# Patient Record
Sex: FEMALE | Race: WHITE | Employment: OTHER | URBAN - METROPOLITAN AREA
[De-identification: names, ages, dates, MRNs, and addresses within clinical notes are randomized per-mention and may not be internally consistent; named-entity substitution may affect disease eponyms.]

---

## 2017-12-04 ENCOUNTER — HOSPITAL ENCOUNTER (OUTPATIENT)
Dept: LAB | Facility: MEDICAL CENTER | Age: 82
End: 2017-12-04
Attending: FAMILY MEDICINE
Payer: MEDICARE

## 2017-12-04 LAB
ALBUMIN SERPL BCP-MCNC: 4.5 G/DL (ref 3.2–4.9)
ALBUMIN/GLOB SERPL: 1.6 G/DL
ALP SERPL-CCNC: 69 U/L (ref 30–99)
ALT SERPL-CCNC: 20 U/L (ref 2–50)
ANION GAP SERPL CALC-SCNC: 6 MMOL/L (ref 0–11.9)
APPEARANCE UR: CLEAR
AST SERPL-CCNC: 28 U/L (ref 12–45)
BASOPHILS # BLD AUTO: 1 % (ref 0–1.8)
BASOPHILS # BLD: 0.05 K/UL (ref 0–0.12)
BILIRUB SERPL-MCNC: 0.7 MG/DL (ref 0.1–1.5)
BILIRUB UR QL STRIP.AUTO: NEGATIVE
BUN SERPL-MCNC: 22 MG/DL (ref 8–22)
CALCIUM SERPL-MCNC: 10.1 MG/DL (ref 8.5–10.5)
CHLORIDE SERPL-SCNC: 100 MMOL/L (ref 96–112)
CHOLEST SERPL-MCNC: 167 MG/DL (ref 100–199)
CO2 SERPL-SCNC: 29 MMOL/L (ref 20–33)
COLOR UR: YELLOW
CREAT SERPL-MCNC: 1.09 MG/DL (ref 0.5–1.4)
CULTURE IF INDICATED INDCX: NO UA CULTURE
EOSINOPHIL # BLD AUTO: 0.08 K/UL (ref 0–0.51)
EOSINOPHIL NFR BLD: 1.6 % (ref 0–6.9)
ERYTHROCYTE [DISTWIDTH] IN BLOOD BY AUTOMATED COUNT: 41.1 FL (ref 35.9–50)
GFR SERPL CREATININE-BSD FRML MDRD: 48 ML/MIN/1.73 M 2
GLOBULIN SER CALC-MCNC: 2.9 G/DL (ref 1.9–3.5)
GLUCOSE SERPL-MCNC: 86 MG/DL (ref 65–99)
GLUCOSE UR STRIP.AUTO-MCNC: NEGATIVE MG/DL
HCT VFR BLD AUTO: 41.8 % (ref 37–47)
HDLC SERPL-MCNC: 77 MG/DL
HGB BLD-MCNC: 13.9 G/DL (ref 12–16)
IMM GRANULOCYTES # BLD AUTO: 0.02 K/UL (ref 0–0.11)
IMM GRANULOCYTES NFR BLD AUTO: 0.4 % (ref 0–0.9)
KETONES UR STRIP.AUTO-MCNC: NEGATIVE MG/DL
LDLC SERPL CALC-MCNC: 77 MG/DL
LEUKOCYTE ESTERASE UR QL STRIP.AUTO: NEGATIVE
LYMPHOCYTES # BLD AUTO: 1.13 K/UL (ref 1–4.8)
LYMPHOCYTES NFR BLD: 23.2 % (ref 22–41)
MCH RBC QN AUTO: 30.2 PG (ref 27–33)
MCHC RBC AUTO-ENTMCNC: 33.3 G/DL (ref 33.6–35)
MCV RBC AUTO: 90.7 FL (ref 81.4–97.8)
MICRO URNS: NORMAL
MONOCYTES # BLD AUTO: 0.36 K/UL (ref 0–0.85)
MONOCYTES NFR BLD AUTO: 7.4 % (ref 0–13.4)
NEUTROPHILS # BLD AUTO: 3.24 K/UL (ref 2–7.15)
NEUTROPHILS NFR BLD: 66.4 % (ref 44–72)
NITRITE UR QL STRIP.AUTO: NEGATIVE
NRBC # BLD AUTO: 0 K/UL
NRBC BLD AUTO-RTO: 0 /100 WBC
PH UR STRIP.AUTO: 7 [PH]
PLATELET # BLD AUTO: 191 K/UL (ref 164–446)
PMV BLD AUTO: 11.5 FL (ref 9–12.9)
POTASSIUM SERPL-SCNC: 4.1 MMOL/L (ref 3.6–5.5)
PROT SERPL-MCNC: 7.4 G/DL (ref 6–8.2)
PROT UR QL STRIP: NEGATIVE MG/DL
RBC # BLD AUTO: 4.61 M/UL (ref 4.2–5.4)
RBC UR QL AUTO: NEGATIVE
SODIUM SERPL-SCNC: 135 MMOL/L (ref 135–145)
SP GR UR STRIP.AUTO: 1.02
TRIGL SERPL-MCNC: 64 MG/DL (ref 0–149)
TSH SERPL DL<=0.005 MIU/L-ACNC: 4.6 UIU/ML (ref 0.3–3.7)
UROBILINOGEN UR STRIP.AUTO-MCNC: 0.2 MG/DL
WBC # BLD AUTO: 4.9 K/UL (ref 4.8–10.8)

## 2017-12-04 PROCEDURE — 81003 URINALYSIS AUTO W/O SCOPE: CPT

## 2017-12-04 PROCEDURE — 80053 COMPREHEN METABOLIC PANEL: CPT

## 2017-12-04 PROCEDURE — 80061 LIPID PANEL: CPT

## 2017-12-04 PROCEDURE — 84443 ASSAY THYROID STIM HORMONE: CPT

## 2017-12-04 PROCEDURE — 36415 COLL VENOUS BLD VENIPUNCTURE: CPT

## 2017-12-04 PROCEDURE — 85025 COMPLETE CBC W/AUTO DIFF WBC: CPT

## 2018-01-23 ENCOUNTER — HOSPITAL ENCOUNTER (OUTPATIENT)
Dept: LAB | Facility: MEDICAL CENTER | Age: 83
End: 2018-01-23
Attending: FAMILY MEDICINE
Payer: MEDICARE

## 2018-01-23 PROCEDURE — 36415 COLL VENOUS BLD VENIPUNCTURE: CPT

## 2018-01-23 PROCEDURE — 83003 ASSAY GROWTH HORMONE (HGH): CPT

## 2018-01-25 LAB — GHRH SERPL-MCNC: 0.28 NG/ML (ref 0.05–8)

## 2018-02-13 ENCOUNTER — APPOINTMENT (RX ONLY)
Dept: URBAN - METROPOLITAN AREA CLINIC 4 | Facility: CLINIC | Age: 83
Setting detail: DERMATOLOGY
End: 2018-02-13

## 2018-02-13 DIAGNOSIS — L20.89 OTHER ATOPIC DERMATITIS: ICD-10-CM

## 2018-02-13 DIAGNOSIS — Z85.828 PERSONAL HISTORY OF OTHER MALIGNANT NEOPLASM OF SKIN: ICD-10-CM

## 2018-02-13 DIAGNOSIS — D22 MELANOCYTIC NEVI: ICD-10-CM

## 2018-02-13 DIAGNOSIS — L82.1 OTHER SEBORRHEIC KERATOSIS: ICD-10-CM

## 2018-02-13 DIAGNOSIS — D18.0 HEMANGIOMA: ICD-10-CM

## 2018-02-13 DIAGNOSIS — L81.4 OTHER MELANIN HYPERPIGMENTATION: ICD-10-CM

## 2018-02-13 DIAGNOSIS — L57.0 ACTINIC KERATOSIS: ICD-10-CM

## 2018-02-13 PROBLEM — D18.01 HEMANGIOMA OF SKIN AND SUBCUTANEOUS TISSUE: Status: ACTIVE | Noted: 2018-02-13

## 2018-02-13 PROBLEM — L20.84 INTRINSIC (ALLERGIC) ECZEMA: Status: ACTIVE | Noted: 2018-02-13

## 2018-02-13 PROBLEM — D22.5 MELANOCYTIC NEVI OF TRUNK: Status: ACTIVE | Noted: 2018-02-13

## 2018-02-13 PROCEDURE — ? COUNSELING

## 2018-02-13 PROCEDURE — 99213 OFFICE O/P EST LOW 20 MIN: CPT | Mod: 25

## 2018-02-13 PROCEDURE — 17003 DESTRUCT PREMALG LES 2-14: CPT

## 2018-02-13 PROCEDURE — ? LIQUID NITROGEN

## 2018-02-13 PROCEDURE — 17000 DESTRUCT PREMALG LESION: CPT

## 2018-02-13 ASSESSMENT — LOCATION DETAILED DESCRIPTION DERM
LOCATION DETAILED: RIGHT PROXIMAL PRETIBIAL REGION
LOCATION DETAILED: SUBXIPHOID
LOCATION DETAILED: LEFT ANTERIOR LATERAL PROXIMAL THIGH
LOCATION DETAILED: LEFT PROXIMAL PRETIBIAL REGION
LOCATION DETAILED: LEFT INFERIOR MEDIAL MALAR CHEEK
LOCATION DETAILED: LEFT MEDIAL BREAST 10-11:00 REGION
LOCATION DETAILED: EPIGASTRIC SKIN
LOCATION DETAILED: PERIUMBILICAL SKIN

## 2018-02-13 ASSESSMENT — LOCATION SIMPLE DESCRIPTION DERM
LOCATION SIMPLE: RIGHT PRETIBIAL REGION
LOCATION SIMPLE: LEFT THIGH
LOCATION SIMPLE: LEFT BREAST
LOCATION SIMPLE: LEFT CHEEK
LOCATION SIMPLE: ABDOMEN
LOCATION SIMPLE: LEFT PRETIBIAL REGION

## 2018-02-13 ASSESSMENT — LOCATION ZONE DERM
LOCATION ZONE: TRUNK
LOCATION ZONE: FACE
LOCATION ZONE: LEG

## 2018-02-13 NOTE — PROCEDURE: REASSURANCE
Detail Level: Detailed
Include Location In Plan?: No
Detail Level: Generalized
Detail Level: Zone
Include Location In Plan?: Yes

## 2018-02-13 NOTE — PROCEDURE: LIQUID NITROGEN
Detail Level: Detailed
Consent: The patient's consent was obtained including but not limited to risks of crusting, scabbing, blistering, scarring, darker or lighter pigmentary change, recurrence, incomplete removal and infection.
Duration Of Freeze Thaw-Cycle (Seconds): 5
Number Of Freeze-Thaw Cycles: 1 freeze-thaw cycle
Render Post-Care Instructions In Note?: no
Post-Care Instructions: I reviewed with the patient in detail post-care instructions. Patient is to wear sunprotection, and avoid picking at any of the treated lesions. Pt may apply Vaseline to crusted or scabbing areas.
Medical Necessity Information: It is in your best interest to select a reason for this procedure from the list below. All of these items fulfill various CMS LCD requirements except the new and changing color options.
Duration Of Freeze Thaw-Cycle (Seconds): 0
Medical Necessity Clause: This procedure was medically necessary because the lesions that were treated were:

## 2018-02-28 ENCOUNTER — HOSPITAL ENCOUNTER (EMERGENCY)
Facility: MEDICAL CENTER | Age: 83
End: 2018-02-28
Attending: EMERGENCY MEDICINE
Payer: MEDICARE

## 2018-02-28 VITALS
OXYGEN SATURATION: 96 % | DIASTOLIC BLOOD PRESSURE: 82 MMHG | TEMPERATURE: 98.2 F | WEIGHT: 114.64 LBS | HEIGHT: 64 IN | BODY MASS INDEX: 19.57 KG/M2 | HEART RATE: 83 BPM | SYSTOLIC BLOOD PRESSURE: 186 MMHG | RESPIRATION RATE: 18 BRPM

## 2018-02-28 DIAGNOSIS — R04.0 EPISTAXIS: ICD-10-CM

## 2018-02-28 PROCEDURE — 99284 EMERGENCY DEPT VISIT MOD MDM: CPT

## 2018-02-28 ASSESSMENT — PAIN SCALES - GENERAL: PAINLEVEL_OUTOF10: 0

## 2018-02-28 NOTE — ED NOTES
ERP at bedside. Pt agrees with plan of care discussed by ERP. AIDET acknowledged with patient. Sunil in low position, side rail up for pt safety. Call light within reach. Will continue to monitor.

## 2018-02-28 NOTE — DISCHARGE INSTRUCTIONS
Buy Ponaris at the store, buy humidifier for bedroom    Nosebleed  Nosebleeds are common. They are due to a crack in the inside lining of your nose (mucous membrane) or from a small blood vessel that starts to bleed. Nosebleeds can be caused by many conditions, such as injury, infections, dry mucous membranes or dry climate, medicines, nose picking, and home heating and cooling systems. Most nosebleeds come from blood vessels in the front of your nose.  HOME CARE INSTRUCTIONS   · Try controlling your nosebleed by pinching your nostrils gently and continuously for at least 10 minutes.  · Avoid blowing or sniffing your nose for a number of hours after having a nosebleed.  · Do not put gauze inside your nose yourself. If your nose was packed by your health care provider, try to maintain the pack inside of your nose until your health care provider removes it.  ¨ If a gauze pack was used and it starts to fall out, gently replace it or cut off the end of it.  ¨ If a balloon catheter was used to pack your nose, do not cut or remove it unless your health care provider has instructed you to do that.  · Avoid lying down while you are having a nosebleed. Sit up and lean forward.  · Use a nasal spray decongestant to help with a nosebleed as directed by your health care provider.  · Do not use petroleum jelly or mineral oil in your nose. These can drip into your lungs.  · Maintain humidity in your home by using less air conditioning or by using a humidifier.  · Aspirin and blood thinners make bleeding more likely. If you are prescribed these medicines and you suffer from nosebleeds, ask your health care provider if you should stop taking the medicines or adjust the dose. Do not stop medicines unless directed by your health care provider  · Resume your normal activities as you are able, but avoid straining, lifting, or bending at the waist for several days.  · If your nosebleed was caused by dry mucous membranes, use  over-the-counter saline nasal spray or gel. This will keep the mucous membranes moist and allow them to heal. If you must use a lubricant, choose the water-soluble variety. Use it only sparingly, and do not use it within several hours of lying down.  · Keep all follow-up visits as directed by your health care provider. This is important.  SEEK MEDICAL CARE IF:  · You have a fever.  · You get frequent nosebleeds.  · You are getting nosebleeds more often.  SEEK IMMEDIATE MEDICAL CARE IF:  · Your nosebleed lasts longer than 20 minutes.  · Your nosebleed occurs after an injury to your face, and your nose looks crooked or broken.  · You have unusual bleeding from other parts of your body.  · You have unusual bruising on other parts of your body.  · You feel light-headed or you faint.  · You become sweaty.  · You vomit blood.  · Your nosebleed occurs after a head injury.     This information is not intended to replace advice given to you by your health care provider. Make sure you discuss any questions you have with your health care provider.     Document Released: 09/27/2006 Document Revised: 01/08/2016 Document Reviewed: 08/03/2015  [employer] Interactive Patient Education ©2016 [employer]

## 2018-02-28 NOTE — ED PROVIDER NOTES
ED Provider Note    CHIEF COMPLAINT  Chief Complaint   Patient presents with   • Epistaxis       HPI  Denae Rivas is a 82 y.o. female who presents with left-sided nosebleed that began 3 hours ago. The patient denies being on blood thinners, she has never had a bloody nose previously. She denies any other symptoms. She reports her blood pressure is usually low.    REVIEW OF SYSTEMS  See HPI for further details. All other systems are negative.     PAST MEDICAL HISTORY   has a past medical history of Anesthesia; CATARACT; and Shingles (2009).    SOCIAL HISTORY  Social History     Social History Main Topics   • Smoking status: Never Smoker   • Smokeless tobacco: Not on file   • Alcohol use No   • Drug use: No   • Sexual activity: Not on file       SURGICAL HISTORY   has a past surgical history that includes mammoplasty augmentation (); cataract extraction with iol (); breast implant revision (2010); mastopexy (2010); other (); abdominoplasty (2011); breast implant revision (2011); liposuction (2011); capsulectomy (2011); and enlarge breast with implant (76,84,94,10).    CURRENT MEDICATIONS  cephALEXin (KEFLEX) 500 MG CAPS 2011    Sig: Take 1 Cap by mouth 3 times a day.   Class: Print Rx Paper   Route: Oral   Number of times this order has been changed since signin     Order Audit [place]     hydrALAZINE (APRESOLINE) 10 MG TABS 2011    Sig: Take 1 Tab by mouth every 8 hours.   Class: Print Rx Paper   Route: Oral   Number of times this order has been changed since signin     Order Audit [place]     ARNICA 2011    Sig: by Does not apply route 3 times a day. Takes 5 pellets tid    Class: Historical Med   Route: Does not apply   Number of times this order has been changed since signin     Order Audit [place]     ACYCLOVIR 2011    Sig: by Does not apply route 2 Times a Day.   Class: Historical Med   Route: Does not apply   Number of times this order  "has been changed since signin     Order Audit [place]     zafirlukast (ACCOLATE) 20 MG TABS     Sig: Take 20 mg by mouth 2 times a day.   Class: Historical Med   Route: Oral   Number of times this order has been changed since signin     Order Audit [place]     Multiple Vitamin (MULTIVITAMIN PO) 2010    Sig: Take  by mouth every day.   Class: Historical Med   Route: Oral   Number of times this order has been changed since signin     Order Audit [place]           ALLERGIES  Allergies   Allergen Reactions   • Tetracycline      \"felt intoxicated\"       PHYSICAL EXAM  VITAL SIGNS: BP (!) 186/82   Pulse 92   Temp 36.8 °C (98.2 °F)   Resp 18   Ht 1.626 m (5' 4\")   Wt 52 kg (114 lb 10.2 oz)   SpO2 94%   BMI 19.68 kg/m²  @ADE[268282::@   Pulse ox interpretation: I interpret this pulse ox as normal.  Constitutional: Alert in no apparent distress.  HENT: No signs of trauma, Bilateral external ears normal, Nose normal.   Nose: Patient has no evidence of active bleeding, I do not see the site where the bleeding is occurring.  Eyes: Pupils are equal and reactive, Conjunctiva normal, Non-icteric.   Neck: Normal range of motion, No tenderness, Supple, No stridor.   Lymphatic: No lymphadenopathy noted.   Skin: Warm, Dry, No erythema, No rash.   Extremities: Intact distal pulses, No edema, No tenderness, No cyanosis.  Musculoskeletal: Good range of motion in all major joints. No tenderness to palpation or major deformities noted.   Neurologic: Alert , Normal motor function, Normal sensory function, No focal deficits noted.   Psychiatric: Affect normal, Judgment normal, Mood normal.       DIAGNOSTIC STUDIES / PROCEDURES        COURSE & MEDICAL DECISION MAKING  Pertinent Labs & Imaging studies reviewed. (See chart for details)    We placed a nose clip on the patient's nose, this resolved her epistaxes. We discussed the fact that I cannot see the place where it is bleeding and therefore cannot do chemical cautery. We " discussed the pros and cons of packing the nose, however, the patient is not on blood thinners, her bleeding has stopped with pressure. We agree at this time not to pack the nose. She will return if worse.    The patient will return for new or worsening symptoms and is stable at the time of discharge.    The patient is referred to her primary physician for blood pressure management, diabetic screening, and for all other preventative health concerns.        DISPOSITION:  Patient will be discharged home in stable condition.    FOLLOW UP:  [facility], Emergency Dept  [id number]  [facility] [id number] [phone]    If symptoms worsen    Alexandro Atkinson M.D.  [address] [facility] [address]  [phone]      As needed      OUTPATIENT MEDICATIONS:  New Prescriptions    No medications on file      The patient will return for worsening symptoms and is stable at the time of discharge. The patient verbalizes understanding and will comply.    FINAL IMPRESSION  1. Epistaxis  2.   3.         Electronically signed by: Asim Rosales, 2/28/2018 3:21 PM

## 2018-02-28 NOTE — ED NOTES
Left nare epistaxis x 3 hours. Denies nausea. States light headed.  Brought in via [facility]. Nasal clamp in use.  Denies blood thinners or hx of hypertension.

## 2018-07-09 ENCOUNTER — RX ONLY (OUTPATIENT)
Age: 83
Setting detail: RX ONLY
End: 2018-07-09

## 2018-07-09 RX ORDER — ACYCLOVIR 50 MG/G
OINTMENT TOPICAL
Qty: 1 | Refills: 9 | Status: CANCELLED
Stop reason: CLARIF

## 2018-12-13 ENCOUNTER — HOSPITAL ENCOUNTER (OUTPATIENT)
Dept: LAB | Facility: MEDICAL CENTER | Age: 83
End: 2018-12-13
Attending: FAMILY MEDICINE
Payer: MEDICARE

## 2018-12-13 LAB
ALBUMIN SERPL BCP-MCNC: 4.5 G/DL (ref 3.2–4.9)
ALBUMIN/GLOB SERPL: 2 G/DL
ALP SERPL-CCNC: 68 U/L (ref 30–99)
ALT SERPL-CCNC: 29 U/L (ref 2–50)
ANION GAP SERPL CALC-SCNC: 9 MMOL/L (ref 0–11.9)
APPEARANCE UR: CLEAR
AST SERPL-CCNC: 37 U/L (ref 12–45)
BACTERIA #/AREA URNS HPF: NEGATIVE /HPF
BASOPHILS # BLD AUTO: 1.4 % (ref 0–1.8)
BASOPHILS # BLD: 0.07 K/UL (ref 0–0.12)
BILIRUB SERPL-MCNC: 0.7 MG/DL (ref 0.1–1.5)
BILIRUB UR QL STRIP.AUTO: NEGATIVE
BUN SERPL-MCNC: 16 MG/DL (ref 8–22)
CALCIUM SERPL-MCNC: 9.8 MG/DL (ref 8.5–10.5)
CHLORIDE SERPL-SCNC: 98 MMOL/L (ref 96–112)
CHOLEST SERPL-MCNC: 176 MG/DL (ref 100–199)
CO2 SERPL-SCNC: 27 MMOL/L (ref 20–33)
COLOR UR: YELLOW
CREAT SERPL-MCNC: 1.02 MG/DL (ref 0.5–1.4)
EOSINOPHIL # BLD AUTO: 0.04 K/UL (ref 0–0.51)
EOSINOPHIL NFR BLD: 0.8 % (ref 0–6.9)
EPI CELLS #/AREA URNS HPF: NEGATIVE /HPF
ERYTHROCYTE [DISTWIDTH] IN BLOOD BY AUTOMATED COUNT: 44.1 FL (ref 35.9–50)
FASTING STATUS PATIENT QL REPORTED: NORMAL
GLOBULIN SER CALC-MCNC: 2.3 G/DL (ref 1.9–3.5)
GLUCOSE SERPL-MCNC: 91 MG/DL (ref 65–99)
GLUCOSE UR STRIP.AUTO-MCNC: NEGATIVE MG/DL
HCT VFR BLD AUTO: 41 % (ref 37–47)
HDLC SERPL-MCNC: 91 MG/DL
HGB BLD-MCNC: 13 G/DL (ref 12–16)
HYALINE CASTS #/AREA URNS LPF: NORMAL /LPF
IMM GRANULOCYTES # BLD AUTO: 0.02 K/UL (ref 0–0.11)
IMM GRANULOCYTES NFR BLD AUTO: 0.4 % (ref 0–0.9)
KETONES UR STRIP.AUTO-MCNC: NEGATIVE MG/DL
LDLC SERPL CALC-MCNC: 70 MG/DL
LEUKOCYTE ESTERASE UR QL STRIP.AUTO: ABNORMAL
LYMPHOCYTES # BLD AUTO: 1.21 K/UL (ref 1–4.8)
LYMPHOCYTES NFR BLD: 24.2 % (ref 22–41)
MCH RBC QN AUTO: 28.4 PG (ref 27–33)
MCHC RBC AUTO-ENTMCNC: 31.7 G/DL (ref 33.6–35)
MCV RBC AUTO: 89.7 FL (ref 81.4–97.8)
MICRO URNS: ABNORMAL
MONOCYTES # BLD AUTO: 0.34 K/UL (ref 0–0.85)
MONOCYTES NFR BLD AUTO: 6.8 % (ref 0–13.4)
NEUTROPHILS # BLD AUTO: 3.31 K/UL (ref 2–7.15)
NEUTROPHILS NFR BLD: 66.4 % (ref 44–72)
NITRITE UR QL STRIP.AUTO: NEGATIVE
NRBC # BLD AUTO: 0 K/UL
NRBC BLD-RTO: 0 /100 WBC
PH UR STRIP.AUTO: 7 [PH]
PLATELET # BLD AUTO: 191 K/UL (ref 164–446)
PMV BLD AUTO: 11 FL (ref 9–12.9)
POTASSIUM SERPL-SCNC: 4.1 MMOL/L (ref 3.6–5.5)
PROT SERPL-MCNC: 6.8 G/DL (ref 6–8.2)
PROT UR QL STRIP: NEGATIVE MG/DL
RBC # BLD AUTO: 4.57 M/UL (ref 4.2–5.4)
RBC # URNS HPF: NORMAL /HPF
RBC UR QL AUTO: NEGATIVE
SODIUM SERPL-SCNC: 134 MMOL/L (ref 135–145)
SP GR UR STRIP.AUTO: 1.01
TRIGL SERPL-MCNC: 74 MG/DL (ref 0–149)
UROBILINOGEN UR STRIP.AUTO-MCNC: 0.2 MG/DL
WBC # BLD AUTO: 5 K/UL (ref 4.8–10.8)
WBC #/AREA URNS HPF: NORMAL /HPF

## 2018-12-13 PROCEDURE — 36415 COLL VENOUS BLD VENIPUNCTURE: CPT

## 2018-12-13 PROCEDURE — 80061 LIPID PANEL: CPT

## 2018-12-13 PROCEDURE — 85025 COMPLETE CBC W/AUTO DIFF WBC: CPT

## 2018-12-13 PROCEDURE — 82306 VITAMIN D 25 HYDROXY: CPT

## 2018-12-13 PROCEDURE — 81001 URINALYSIS AUTO W/SCOPE: CPT

## 2018-12-13 PROCEDURE — 80053 COMPREHEN METABOLIC PANEL: CPT

## 2018-12-13 PROCEDURE — 84443 ASSAY THYROID STIM HORMONE: CPT

## 2018-12-14 LAB
25(OH)D3 SERPL-MCNC: 67 NG/ML (ref 30–100)
TSH SERPL DL<=0.005 MIU/L-ACNC: 3.87 UIU/ML (ref 0.38–5.33)

## 2019-01-23 ENCOUNTER — HOSPITAL ENCOUNTER (OUTPATIENT)
Dept: LAB | Facility: MEDICAL CENTER | Age: 84
End: 2019-01-23
Attending: FAMILY MEDICINE
Payer: MEDICARE

## 2019-01-23 PROCEDURE — 83003 ASSAY GROWTH HORMONE (HGH): CPT

## 2019-01-23 PROCEDURE — 36415 COLL VENOUS BLD VENIPUNCTURE: CPT

## 2019-01-25 LAB — GHRH SERPL-MCNC: 0.26 NG/ML (ref 0.05–8)

## 2019-04-10 ENCOUNTER — APPOINTMENT (RX ONLY)
Dept: URBAN - METROPOLITAN AREA CLINIC 4 | Facility: CLINIC | Age: 84
Setting detail: DERMATOLOGY
End: 2019-04-10

## 2019-04-10 DIAGNOSIS — L81.4 OTHER MELANIN HYPERPIGMENTATION: ICD-10-CM

## 2019-04-10 DIAGNOSIS — L82.1 OTHER SEBORRHEIC KERATOSIS: ICD-10-CM

## 2019-04-10 DIAGNOSIS — Z85.828 PERSONAL HISTORY OF OTHER MALIGNANT NEOPLASM OF SKIN: ICD-10-CM

## 2019-04-10 DIAGNOSIS — L57.0 ACTINIC KERATOSIS: ICD-10-CM

## 2019-04-10 DIAGNOSIS — L82.0 INFLAMED SEBORRHEIC KERATOSIS: ICD-10-CM

## 2019-04-10 DIAGNOSIS — D18.0 HEMANGIOMA: ICD-10-CM

## 2019-04-10 PROBLEM — D18.01 HEMANGIOMA OF SKIN AND SUBCUTANEOUS TISSUE: Status: ACTIVE | Noted: 2019-04-10

## 2019-04-10 PROCEDURE — ? LIQUID NITROGEN

## 2019-04-10 PROCEDURE — 99213 OFFICE O/P EST LOW 20 MIN: CPT | Mod: 25

## 2019-04-10 PROCEDURE — 17000 DESTRUCT PREMALG LESION: CPT

## 2019-04-10 ASSESSMENT — LOCATION SIMPLE DESCRIPTION DERM
LOCATION SIMPLE: LOWER BACK
LOCATION SIMPLE: UPPER BACK
LOCATION SIMPLE: LEFT FOREARM
LOCATION SIMPLE: RIGHT UPPER ARM
LOCATION SIMPLE: ABDOMEN
LOCATION SIMPLE: LEFT THIGH
LOCATION SIMPLE: RIGHT THIGH
LOCATION SIMPLE: RIGHT CHEEK

## 2019-04-10 ASSESSMENT — LOCATION DETAILED DESCRIPTION DERM
LOCATION DETAILED: LEFT ANTERIOR DISTAL THIGH
LOCATION DETAILED: RIGHT SUPERIOR MEDIAL MALAR CHEEK
LOCATION DETAILED: LEFT PROXIMAL DORSAL FOREARM
LOCATION DETAILED: RIGHT ANTERIOR DISTAL THIGH
LOCATION DETAILED: SUPERIOR THORACIC SPINE
LOCATION DETAILED: LEFT ANTERIOR PROXIMAL THIGH
LOCATION DETAILED: PERIUMBILICAL SKIN
LOCATION DETAILED: RIGHT ANTERIOR DISTAL UPPER ARM
LOCATION DETAILED: RIGHT ANTERIOR PROXIMAL THIGH
LOCATION DETAILED: INFERIOR THORACIC SPINE
LOCATION DETAILED: SUPERIOR LUMBAR SPINE

## 2019-04-10 ASSESSMENT — LOCATION ZONE DERM
LOCATION ZONE: FACE
LOCATION ZONE: ARM
LOCATION ZONE: TRUNK
LOCATION ZONE: LEG

## 2019-04-10 NOTE — HPI: SKIN LESION
Is This A New Presentation, Or A Follow-Up?: Skin Lesion
What Type Of Note Output Would You Prefer (Optional)?: Bullet Format
How Severe Is Your Skin Lesion?: mild
Has Your Skin Lesion Been Treated?: not been treated
Additional History: Patient says that it was red and tender. Patient picked at it and white liquid came out.

## 2019-04-10 NOTE — PROCEDURE: LIQUID NITROGEN
Add 52 Modifier (Optional): no
Medical Necessity Clause: This procedure was medically necessary because the lesions that were treated were:
Medical Necessity Information: It is in your best interest to select a reason for this procedure from the list below. All of these items fulfill various CMS LCD requirements except the new and changing color options.
Consent: The patient's consent was obtained including but not limited to risks of crusting, scabbing, blistering, scarring, darker or lighter pigmentary change, recurrence, incomplete removal and infection.
Detail Level: Detailed
Post-Care Instructions: I reviewed with the patient in detail post-care instructions. Patient is to wear sunprotection, and avoid picking at any of the treated lesions. Pt may apply Vaseline to crusted or scabbing areas.
Number Of Freeze-Thaw Cycles: 1 freeze-thaw cycle
Duration Of Freeze Thaw-Cycle (Seconds): 5

## 2019-12-16 ENCOUNTER — HOSPITAL ENCOUNTER (OUTPATIENT)
Dept: LAB | Facility: MEDICAL CENTER | Age: 84
End: 2019-12-16
Attending: FAMILY MEDICINE
Payer: MEDICARE

## 2019-12-16 LAB
ALBUMIN SERPL BCP-MCNC: 4.9 G/DL (ref 3.2–4.9)
ALBUMIN/GLOB SERPL: 2 G/DL
ALP SERPL-CCNC: 79 U/L (ref 30–99)
ALT SERPL-CCNC: 17 U/L (ref 2–50)
ANION GAP SERPL CALC-SCNC: 11 MMOL/L (ref 0–11.9)
APPEARANCE UR: CLEAR
AST SERPL-CCNC: 25 U/L (ref 12–45)
BACTERIA #/AREA URNS HPF: NEGATIVE /HPF
BASOPHILS # BLD AUTO: 1.5 % (ref 0–1.8)
BASOPHILS # BLD: 0.07 K/UL (ref 0–0.12)
BILIRUB SERPL-MCNC: 1 MG/DL (ref 0.1–1.5)
BILIRUB UR QL STRIP.AUTO: NEGATIVE
BUN SERPL-MCNC: 17 MG/DL (ref 8–22)
CALCIUM SERPL-MCNC: 9.9 MG/DL (ref 8.5–10.5)
CHLORIDE SERPL-SCNC: 99 MMOL/L (ref 96–112)
CHOLEST SERPL-MCNC: 162 MG/DL (ref 100–199)
CO2 SERPL-SCNC: 29 MMOL/L (ref 20–33)
COLOR UR: YELLOW
CREAT SERPL-MCNC: 1.03 MG/DL (ref 0.5–1.4)
EOSINOPHIL # BLD AUTO: 0.06 K/UL (ref 0–0.51)
EOSINOPHIL NFR BLD: 1.3 % (ref 0–6.9)
EPI CELLS #/AREA URNS HPF: NEGATIVE /HPF
ERYTHROCYTE [DISTWIDTH] IN BLOOD BY AUTOMATED COUNT: 45.9 FL (ref 35.9–50)
GLOBULIN SER CALC-MCNC: 2.4 G/DL (ref 1.9–3.5)
GLUCOSE SERPL-MCNC: 90 MG/DL (ref 65–99)
GLUCOSE UR STRIP.AUTO-MCNC: NEGATIVE MG/DL
HCT VFR BLD AUTO: 46 % (ref 37–47)
HDLC SERPL-MCNC: 81 MG/DL
HGB BLD-MCNC: 14.7 G/DL (ref 12–16)
HYALINE CASTS #/AREA URNS LPF: NORMAL /LPF
IMM GRANULOCYTES # BLD AUTO: 0.02 K/UL (ref 0–0.11)
IMM GRANULOCYTES NFR BLD AUTO: 0.4 % (ref 0–0.9)
KETONES UR STRIP.AUTO-MCNC: NEGATIVE MG/DL
LDLC SERPL CALC-MCNC: 68 MG/DL
LEUKOCYTE ESTERASE UR QL STRIP.AUTO: ABNORMAL
LYMPHOCYTES # BLD AUTO: 1.04 K/UL (ref 1–4.8)
LYMPHOCYTES NFR BLD: 21.9 % (ref 22–41)
MCH RBC QN AUTO: 29.2 PG (ref 27–33)
MCHC RBC AUTO-ENTMCNC: 32 G/DL (ref 33.6–35)
MCV RBC AUTO: 91.3 FL (ref 81.4–97.8)
MICRO URNS: ABNORMAL
MONOCYTES # BLD AUTO: 0.34 K/UL (ref 0–0.85)
MONOCYTES NFR BLD AUTO: 7.2 % (ref 0–13.4)
NEUTROPHILS # BLD AUTO: 3.22 K/UL (ref 2–7.15)
NEUTROPHILS NFR BLD: 67.7 % (ref 44–72)
NITRITE UR QL STRIP.AUTO: NEGATIVE
NRBC # BLD AUTO: 0 K/UL
NRBC BLD-RTO: 0 /100 WBC
PH UR STRIP.AUTO: 6.5 [PH] (ref 5–8)
PLATELET # BLD AUTO: 204 K/UL (ref 164–446)
PMV BLD AUTO: 11.1 FL (ref 9–12.9)
POTASSIUM SERPL-SCNC: 4.6 MMOL/L (ref 3.6–5.5)
PROT SERPL-MCNC: 7.3 G/DL (ref 6–8.2)
PROT UR QL STRIP: NEGATIVE MG/DL
RBC # BLD AUTO: 5.04 M/UL (ref 4.2–5.4)
RBC # URNS HPF: NORMAL /HPF
RBC UR QL AUTO: NEGATIVE
SODIUM SERPL-SCNC: 139 MMOL/L (ref 135–145)
SP GR UR STRIP.AUTO: 1.01
TRIGL SERPL-MCNC: 64 MG/DL (ref 0–149)
UROBILINOGEN UR STRIP.AUTO-MCNC: 0.2 MG/DL
WBC # BLD AUTO: 4.8 K/UL (ref 4.8–10.8)
WBC #/AREA URNS HPF: NORMAL /HPF

## 2019-12-16 PROCEDURE — 84443 ASSAY THYROID STIM HORMONE: CPT

## 2019-12-16 PROCEDURE — 36415 COLL VENOUS BLD VENIPUNCTURE: CPT

## 2019-12-16 PROCEDURE — 80053 COMPREHEN METABOLIC PANEL: CPT

## 2019-12-16 PROCEDURE — 81001 URINALYSIS AUTO W/SCOPE: CPT

## 2019-12-16 PROCEDURE — 80061 LIPID PANEL: CPT

## 2019-12-16 PROCEDURE — 85025 COMPLETE CBC W/AUTO DIFF WBC: CPT

## 2019-12-17 ENCOUNTER — TELEPHONE (OUTPATIENT)
Dept: RADIOLOGY | Facility: MEDICAL CENTER | Age: 84
End: 2019-12-17

## 2019-12-17 LAB — TSH SERPL DL<=0.005 MIU/L-ACNC: 4.84 UIU/ML (ref 0.38–5.33)

## 2019-12-18 ENCOUNTER — HOSPITAL ENCOUNTER (OUTPATIENT)
Dept: RADIOLOGY | Facility: MEDICAL CENTER | Age: 84
End: 2019-12-18
Attending: FAMILY MEDICINE
Payer: MEDICARE

## 2019-12-18 DIAGNOSIS — M81.0 AGE-RELATED OSTEOPOROSIS WITHOUT CURRENT PATHOLOGICAL FRACTURE: ICD-10-CM

## 2019-12-18 PROCEDURE — 77080 DXA BONE DENSITY AXIAL: CPT

## 2020-02-05 ENCOUNTER — APPOINTMENT (RX ONLY)
Dept: URBAN - METROPOLITAN AREA CLINIC 4 | Facility: CLINIC | Age: 85
Setting detail: DERMATOLOGY
End: 2020-02-05

## 2020-02-05 DIAGNOSIS — Z85.828 PERSONAL HISTORY OF OTHER MALIGNANT NEOPLASM OF SKIN: ICD-10-CM

## 2020-02-05 DIAGNOSIS — L82.1 OTHER SEBORRHEIC KERATOSIS: ICD-10-CM

## 2020-02-05 DIAGNOSIS — L81.4 OTHER MELANIN HYPERPIGMENTATION: ICD-10-CM

## 2020-02-05 DIAGNOSIS — D18.0 HEMANGIOMA: ICD-10-CM

## 2020-02-05 PROBLEM — D18.01 HEMANGIOMA OF SKIN AND SUBCUTANEOUS TISSUE: Status: ACTIVE | Noted: 2020-02-05

## 2020-02-05 PROCEDURE — 99213 OFFICE O/P EST LOW 20 MIN: CPT

## 2020-02-05 PROCEDURE — ? OBSERVATION

## 2020-02-05 ASSESSMENT — LOCATION DETAILED DESCRIPTION DERM
LOCATION DETAILED: RIGHT INFERIOR MEDIAL UPPER BACK
LOCATION DETAILED: PERIUMBILICAL SKIN
LOCATION DETAILED: LEFT DISTAL PRETIBIAL REGION
LOCATION DETAILED: SUPERIOR LUMBAR SPINE
LOCATION DETAILED: SUPERIOR THORACIC SPINE
LOCATION DETAILED: INFERIOR THORACIC SPINE

## 2020-02-05 ASSESSMENT — LOCATION SIMPLE DESCRIPTION DERM
LOCATION SIMPLE: LOWER BACK
LOCATION SIMPLE: LEFT PRETIBIAL REGION
LOCATION SIMPLE: ABDOMEN
LOCATION SIMPLE: UPPER BACK
LOCATION SIMPLE: RIGHT UPPER BACK

## 2020-02-05 ASSESSMENT — LOCATION ZONE DERM
LOCATION ZONE: LEG
LOCATION ZONE: TRUNK

## 2020-02-05 NOTE — PROCEDURE: OBSERVATION
Body Location Override (Optional - Billing Will Still Be Based On Selected Body Map Location If Applicable): Left upper shin
Detail Level: Detailed
Size Of Lesion In Cm (Optional): 0
Body Location Override (Optional - Billing Will Still Be Based On Selected Body Map Location If Applicable): Right mid lower back

## 2020-02-05 NOTE — PROCEDURE: REASSURANCE
Hide Include Location In Plan Question?: No
Detail Level: Generalized
Detail Level: Zone
Additional Note: Miranda’s card was given to pt to schedule an appt for laser.

## 2021-01-08 DIAGNOSIS — Z23 NEED FOR VACCINATION: ICD-10-CM

## 2021-02-11 ENCOUNTER — HOSPITAL ENCOUNTER (OUTPATIENT)
Dept: LAB | Facility: MEDICAL CENTER | Age: 86
End: 2021-02-11
Attending: ORTHOPAEDIC SURGERY
Payer: MEDICARE

## 2021-02-11 LAB
ALBUMIN SERPL BCP-MCNC: 4.7 G/DL (ref 3.2–4.9)
ALBUMIN/GLOB SERPL: 1.8 G/DL
ALP SERPL-CCNC: 65 U/L (ref 30–99)
ALT SERPL-CCNC: 17 U/L (ref 2–50)
ANION GAP SERPL CALC-SCNC: 10 MMOL/L (ref 7–16)
APPEARANCE UR: CLEAR
AST SERPL-CCNC: 31 U/L (ref 12–45)
BASOPHILS # BLD AUTO: 1 % (ref 0–1.8)
BASOPHILS # BLD: 0.06 K/UL (ref 0–0.12)
BILIRUB SERPL-MCNC: 0.6 MG/DL (ref 0.1–1.5)
BILIRUB UR QL STRIP.AUTO: NEGATIVE
BUN SERPL-MCNC: 15 MG/DL (ref 8–22)
CALCIUM SERPL-MCNC: 9.8 MG/DL (ref 8.5–10.5)
CHLORIDE SERPL-SCNC: 99 MMOL/L (ref 96–112)
CHOLEST SERPL-MCNC: 190 MG/DL (ref 100–199)
CO2 SERPL-SCNC: 29 MMOL/L (ref 20–33)
COLOR UR: YELLOW
CREAT SERPL-MCNC: 0.83 MG/DL (ref 0.5–1.4)
EOSINOPHIL # BLD AUTO: 0.08 K/UL (ref 0–0.51)
EOSINOPHIL NFR BLD: 1.3 % (ref 0–6.9)
ERYTHROCYTE [DISTWIDTH] IN BLOOD BY AUTOMATED COUNT: 44.1 FL (ref 35.9–50)
FASTING STATUS PATIENT QL REPORTED: NORMAL
GLOBULIN SER CALC-MCNC: 2.6 G/DL (ref 1.9–3.5)
GLUCOSE SERPL-MCNC: 90 MG/DL (ref 65–99)
GLUCOSE UR STRIP.AUTO-MCNC: NEGATIVE MG/DL
HCT VFR BLD AUTO: 45 % (ref 37–47)
HDLC SERPL-MCNC: 93 MG/DL
HGB BLD-MCNC: 14.3 G/DL (ref 12–16)
IMM GRANULOCYTES # BLD AUTO: 0.03 K/UL (ref 0–0.11)
IMM GRANULOCYTES NFR BLD AUTO: 0.5 % (ref 0–0.9)
KETONES UR STRIP.AUTO-MCNC: NEGATIVE MG/DL
LDLC SERPL CALC-MCNC: 82 MG/DL
LEUKOCYTE ESTERASE UR QL STRIP.AUTO: NEGATIVE
LYMPHOCYTES # BLD AUTO: 1.19 K/UL (ref 1–4.8)
LYMPHOCYTES NFR BLD: 19.9 % (ref 22–41)
MCH RBC QN AUTO: 28.8 PG (ref 27–33)
MCHC RBC AUTO-ENTMCNC: 31.8 G/DL (ref 33.6–35)
MCV RBC AUTO: 90.7 FL (ref 81.4–97.8)
MICRO URNS: NORMAL
MONOCYTES # BLD AUTO: 0.44 K/UL (ref 0–0.85)
MONOCYTES NFR BLD AUTO: 7.4 % (ref 0–13.4)
NEUTROPHILS # BLD AUTO: 4.17 K/UL (ref 2–7.15)
NEUTROPHILS NFR BLD: 69.9 % (ref 44–72)
NITRITE UR QL STRIP.AUTO: NEGATIVE
NRBC # BLD AUTO: 0 K/UL
NRBC BLD-RTO: 0 /100 WBC
PH UR STRIP.AUTO: 6 [PH] (ref 5–8)
PLATELET # BLD AUTO: 221 K/UL (ref 164–446)
PMV BLD AUTO: 11.2 FL (ref 9–12.9)
POTASSIUM SERPL-SCNC: 4.6 MMOL/L (ref 3.6–5.5)
PROT SERPL-MCNC: 7.3 G/DL (ref 6–8.2)
PROT UR QL STRIP: NEGATIVE MG/DL
RBC # BLD AUTO: 4.96 M/UL (ref 4.2–5.4)
RBC UR QL AUTO: NEGATIVE
SODIUM SERPL-SCNC: 138 MMOL/L (ref 135–145)
SP GR UR STRIP.AUTO: 1.02
TRIGL SERPL-MCNC: 74 MG/DL (ref 0–149)
TSH SERPL DL<=0.005 MIU/L-ACNC: 5.34 UIU/ML (ref 0.38–5.33)
UROBILINOGEN UR STRIP.AUTO-MCNC: 0.2 MG/DL
WBC # BLD AUTO: 6 K/UL (ref 4.8–10.8)

## 2021-02-11 PROCEDURE — 80061 LIPID PANEL: CPT

## 2021-02-11 PROCEDURE — 80053 COMPREHEN METABOLIC PANEL: CPT

## 2021-02-11 PROCEDURE — 84443 ASSAY THYROID STIM HORMONE: CPT

## 2021-02-11 PROCEDURE — 85025 COMPLETE CBC W/AUTO DIFF WBC: CPT

## 2021-02-11 PROCEDURE — 81003 URINALYSIS AUTO W/O SCOPE: CPT

## 2021-02-11 PROCEDURE — 36415 COLL VENOUS BLD VENIPUNCTURE: CPT

## 2021-03-01 ENCOUNTER — APPOINTMENT (RX ONLY)
Dept: URBAN - METROPOLITAN AREA CLINIC 4 | Facility: CLINIC | Age: 86
Setting detail: DERMATOLOGY
End: 2021-03-01

## 2021-03-01 DIAGNOSIS — L82.1 OTHER SEBORRHEIC KERATOSIS: ICD-10-CM

## 2021-03-01 DIAGNOSIS — Z85.828 PERSONAL HISTORY OF OTHER MALIGNANT NEOPLASM OF SKIN: ICD-10-CM

## 2021-03-01 DIAGNOSIS — B00.1 HERPESVIRAL VESICULAR DERMATITIS: ICD-10-CM

## 2021-03-01 DIAGNOSIS — D18.0 HEMANGIOMA: ICD-10-CM

## 2021-03-01 DIAGNOSIS — L44.8 OTHER SPECIFIED PAPULOSQUAMOUS DISORDERS: ICD-10-CM

## 2021-03-01 DIAGNOSIS — L81.4 OTHER MELANIN HYPERPIGMENTATION: ICD-10-CM

## 2021-03-01 PROBLEM — D18.01 HEMANGIOMA OF SKIN AND SUBCUTANEOUS TISSUE: Status: ACTIVE | Noted: 2021-03-01

## 2021-03-01 PROBLEM — D48.5 NEOPLASM OF UNCERTAIN BEHAVIOR OF SKIN: Status: ACTIVE | Noted: 2021-03-01

## 2021-03-01 PROCEDURE — ? BIOPSY BY SHAVE METHOD

## 2021-03-01 PROCEDURE — 99213 OFFICE O/P EST LOW 20 MIN: CPT | Mod: 25

## 2021-03-01 PROCEDURE — ? COUNSELING

## 2021-03-01 PROCEDURE — ? OBSERVATION

## 2021-03-01 PROCEDURE — ? PRESCRIPTION

## 2021-03-01 PROCEDURE — 11102 TANGNTL BX SKIN SINGLE LES: CPT

## 2021-03-01 RX ORDER — ACYCLOVIR 400 MG/1
1 TABLET ORAL PRN
Qty: 15 | Refills: 6 | Status: ERX | COMMUNITY
Start: 2021-03-01

## 2021-03-01 RX ORDER — ACYCLOVIR 50 MG/G
1 OINTMENT TOPICAL TID
Qty: 1 | Refills: 0 | Status: ERX | COMMUNITY
Start: 2021-03-01

## 2021-03-01 RX ADMIN — ACYCLOVIR 1: 50 OINTMENT TOPICAL at 00:00

## 2021-03-01 RX ADMIN — ACYCLOVIR 1: 400 TABLET ORAL at 00:00

## 2021-03-01 ASSESSMENT — LOCATION SIMPLE DESCRIPTION DERM
LOCATION SIMPLE: ABDOMEN
LOCATION SIMPLE: UPPER BACK
LOCATION SIMPLE: LEFT FOREARM
LOCATION SIMPLE: LEFT PRETIBIAL REGION
LOCATION SIMPLE: LOWER BACK
LOCATION SIMPLE: RIGHT UPPER BACK

## 2021-03-01 ASSESSMENT — LOCATION DETAILED DESCRIPTION DERM
LOCATION DETAILED: SUPERIOR LUMBAR SPINE
LOCATION DETAILED: PERIUMBILICAL SKIN
LOCATION DETAILED: SUPERIOR THORACIC SPINE
LOCATION DETAILED: RIGHT INFERIOR MEDIAL UPPER BACK
LOCATION DETAILED: INFERIOR THORACIC SPINE
LOCATION DETAILED: LEFT PROXIMAL DORSAL FOREARM
LOCATION DETAILED: LEFT DISTAL PRETIBIAL REGION

## 2021-03-01 ASSESSMENT — LOCATION ZONE DERM
LOCATION ZONE: ARM
LOCATION ZONE: LEG
LOCATION ZONE: TRUNK

## 2021-03-01 NOTE — PROCEDURE: REASSURANCE
Detail Level: Zone
Additional Note: Miranda’s card was given to pt to schedule an appt for laser.
Include Location In Plan?: No
Detail Level: Generalized

## 2021-03-01 NOTE — PROCEDURE: BIOPSY BY SHAVE METHOD
Detail Level: Detailed
Depth Of Biopsy: dermis
Was A Bandage Applied: Yes
Size Of Lesion In Cm: 0
Anticipated Plan (Based On Presumed Biopsy Results): Excision
Biopsy Type: H and E
Biopsy Method: Personna blade
Anesthesia Type: 1% lidocaine with epinephrine and a 1:10 solution of 8.4% sodium bicarbonate
Anesthesia Volume In Cc: 2
Hemostasis: Drysol and Electrocautery
Wound Care: Vaseline
Dressing: Band-Aid
Destruction After The Procedure: No
Type Of Destruction Used: Curettage
Curettage Text: The wound bed was treated with curettage after the biopsy was performed.
Electrodesiccation Text: The wound bed was treated with electrodesiccation after the biopsy was performed.
Electrodesiccation And Curettage Text: The wound bed was treated with electrodesiccation and curettage after the biopsy was performed.
Lab: [id number]
Lab Facility: 
Consent: Verbal consent was obtained and risks were reviewed including but not limited to scarring, infection, bleeding, scabbing, incomplete removal, nerve damage and allergy to anesthesia.
Post-Care Instructions: I reviewed with the patient in detail post-care instructions. Patient is to keep the biopsy site dry overnight, and then apply vasaline twice daily until healed.
Notification Instructions: Patient will be notified of biopsy results. However, patient instructed to call the office if not contacted within 2 weeks.
Billing Type: Third-Party Bill
Information: Selecting Yes will display possible errors in your note based on the variables you have selected. This validation is only offered as a suggestion for you. PLEASE NOTE THAT THE VALIDATION TEXT WILL BE REMOVED WHEN YOU FINALIZE YOUR NOTE. IF YOU WANT TO FAX A PRELIMINARY NOTE YOU WILL NEED TO TOGGLE THIS TO 'NO' IF YOU DO NOT WANT IT IN YOUR FAXED NOTE.

## 2021-03-09 ENCOUNTER — APPOINTMENT (RX ONLY)
Dept: URBAN - METROPOLITAN AREA CLINIC 4 | Facility: CLINIC | Age: 86
Setting detail: DERMATOLOGY
End: 2021-03-09

## 2021-03-09 ENCOUNTER — RX ONLY (OUTPATIENT)
Age: 86
Setting detail: RX ONLY
End: 2021-03-09

## 2021-03-09 PROBLEM — C44.42 SQUAMOUS CELL CARCINOMA OF SKIN OF SCALP AND NECK: Status: ACTIVE | Noted: 2021-03-09

## 2021-03-09 PROCEDURE — 12042 INTMD RPR N-HF/GENIT2.6-7.5: CPT

## 2021-03-09 PROCEDURE — 11622 EXC S/N/H/F/G MAL+MRG 1.1-2: CPT

## 2021-03-09 PROCEDURE — ? EXCISION

## 2021-03-09 RX ORDER — ACYCLOVIR 50 MG/G
1 OINTMENT TOPICAL TID
Qty: 1 | Refills: 6

## 2021-03-09 NOTE — PROCEDURE: EXCISION
Surgeon Performing The Repair (Optional): Lizbet
Biopsy Photograph Reviewed: Yes
Previous Accession (Optional): [id number]
Pathology Comment (Optional): Cystic
Size Of Lesion In Cm: 0.5
X Size Of Lesion In Cm (Optional): 0
Size Of Margin In Cm: 0.3
Excision Method: Fusiform
Anesthesia Volume In Cc: 6
Did You Provide Opioid Counseling: No
Repair Type: Intermediate
Suturegard Retention Suture: 2-0 Nylon
Retention Suture Bite Size: 3 mm
Length To Time In Minutes Device Was In Place: 10
Number Of Hemigard Strips Per Side: 1
Intermediate / Complex Repair - Final Wound Length In Cm: 3.3
Undermining Type: Entire Wound
Debridement Text: The wound edges were debrided prior to proceeding with the closure to facilitate wound healing.
Helical Rim Text: The closure involved the helical rim.
Vermilion Border Text: The closure involved the vermilion border.
Nostril Rim Text: The closure involved the nostril rim.
Retention Suture Text: Retention sutures were placed to support the closure and prevent dehiscence.
Suture Removal: 8 days
Lab: [id number]
Lab Facility: 
Graft Donor Site Bandage (Optional-Leave Blank If You Don't Want In Note): Steri-strips and a pressure bandage were applied to the donor site.
Epidermal Closure Graft Donor Site (Optional): simple interrupted
Billing Type: Third-Party Bill
Excision Depth: adipose tissue
Scalpel Size: 15 blade
Anesthesia Type: 1% lidocaine with 1:100,000 epinephrine and a 1:10 solution of 8.4% sodium bicarbonate
Hemostasis: Electrocautery
Estimated Blood Loss (Cc): minimal
Detail Level: Detailed
Deep Sutures: 5-0 Polysorb
Number Of Deep Sutures (Optional): 2
Epidermal Sutures: 5-0 Nylon
Epidermal Closure: running cuticular
Wound Care: Vaseline
Dressing: pressure dressing
Suturegard Intro: Intraoperative tissue expansion was performed, utilizing the SUTUREGARD device, in order to reduce wound tension.
Suturegard Body: The suture ends were repeatedly re-tightened and re-clamped to achieve the desired tissue expansion.
Hemigard Intro: Due to skin fragility and wound tension, it was decided to use HEMIGARD adhesive retention suture devices to permit a linear closure. The skin was cleaned and dried for a 6cm distance away from the wound. Excessive hair, if present, was removed to allow for adhesion.
Hemigard Postcare Instructions: The HEMIGARD strips are to remain completely dry for at least 5-7 days.
Positioning (Leave Blank If You Do Not Want): The patient was placed in a comfortable position exposing the surgical site.
Pre-Excision Curettage Text (Leave Blank If You Do Not Want): Prior to drawing the surgical margin the visible lesion was removed with electrodesiccation and curettage to clearly define the lesion size.
Complex Repair Preamble Text (Leave Blank If You Do Not Want): Extensive wide undermining was performed.
Intermediate Repair Preamble Text (Leave Blank If You Do Not Want): Undermining was performed with blunt dissection.
Curvilinear Excision Additional Text (Leave Blank If You Do Not Want): The margin was drawn around the clinically apparent lesion.  A curvilinear shape was then drawn on the skin incorporating the lesion and margins.  Incisions were then made along these lines to the appropriate tissue plane and the lesion was extirpated.
Fusiform Excision Additional Text (Leave Blank If You Do Not Want): The margin was drawn around the clinically apparent lesion.  A fusiform shape was then drawn on the skin incorporating the lesion and margins.  Incisions were then made along these lines to the appropriate tissue plane and the lesion was extirpated.
Elliptical Excision Additional Text (Leave Blank If You Do Not Want): The margin was drawn around the clinically apparent lesion.  An elliptical shape was then drawn on the skin incorporating the lesion and margins.  Incisions were then made along these lines to the appropriate tissue plane and the lesion was extirpated.
Saucerization Excision Additional Text (Leave Blank If You Do Not Want): The margin was drawn around the clinically apparent lesion.  Incisions were then made along these lines, in a tangential fashion, to the appropriate tissue plane and the lesion was extirpated.
Slit Excision Additional Text (Leave Blank If You Do Not Want): A linear line was drawn on the skin overlying the lesion. An incision was made slowly until the lesion was visualized.  Once visualized, the lesion was removed with blunt dissection.
Excisional Biopsy Additional Text (Leave Blank If You Do Not Want): The margin was drawn around the clinically apparent lesion. An elliptical shape was then drawn on the skin incorporating the lesion and margins.  Incisions were then made along these lines to the appropriate tissue plane and the lesion was extirpated.
Perilesional Excision Additional Text (Leave Blank If You Do Not Want): The margin was drawn around the clinically apparent lesion. Incisions were then made along these lines to the appropriate tissue plane and the lesion was extirpated.
Repair Performed By Another Provider Text (Leave Blank If You Do Not Want): After the tissue was excised the defect was repaired by another provider.
No Repair - Repaired With Adjacent Surgical Defect Text (Leave Blank If You Do Not Want): After the excision the defect was repaired concurrently with another surgical defect which was in close approximation.
Advancement Flap (Single) Text: The defect edges were debeveled with a #15 scalpel blade.  Given the location of the defect and the proximity to free margins a single advancement flap was deemed most appropriate.  Using a sterile surgical marker, an appropriate advancement flap was drawn incorporating the defect and placing the expected incisions within the relaxed skin tension lines where possible.    The area thus outlined was incised deep to adipose tissue with a #15 scalpel blade.  The skin margins were undermined to an appropriate distance in all directions utilizing iris scissors.
Advancement Flap (Double) Text: The defect edges were debeveled with a #15 scalpel blade.  Given the location of the defect and the proximity to free margins a double advancement flap was deemed most appropriate.  Using a sterile surgical marker, the appropriate advancement flaps were drawn incorporating the defect and placing the expected incisions within the relaxed skin tension lines where possible.    The area thus outlined was incised deep to adipose tissue with a #15 scalpel blade.  The skin margins were undermined to an appropriate distance in all directions utilizing iris scissors.
Burow's Advancement Flap Text: The defect edges were debeveled with a #15 scalpel blade.  Given the location of the defect and the proximity to free margins a Burow's advancement flap was deemed most appropriate.  Using a sterile surgical marker, the appropriate advancement flap was drawn incorporating the defect and placing the expected incisions within the relaxed skin tension lines where possible.    The area thus outlined was incised deep to adipose tissue with a #15 scalpel blade.  The skin margins were undermined to an appropriate distance in all directions utilizing iris scissors.
Chonodrocutaneous Helical Advancement Flap Text: The defect edges were debeveled with a #15 scalpel blade.  Given the location of the defect and the proximity to free margins a chondrocutaneous helical advancement flap was deemed most appropriate.  Using a sterile surgical marker, the appropriate advancement flap was drawn incorporating the defect and placing the expected incisions within the relaxed skin tension lines where possible.    The area thus outlined was incised deep to adipose tissue with a #15 scalpel blade.  The skin margins were undermined to an appropriate distance in all directions utilizing iris scissors.
Crescentic Advancement Flap Text: The defect edges were debeveled with a #15 scalpel blade.  Given the location of the defect and the proximity to free margins a crescentic advancement flap was deemed most appropriate.  Using a sterile surgical marker, the appropriate advancement flap was drawn incorporating the defect and placing the expected incisions within the relaxed skin tension lines where possible.    The area thus outlined was incised deep to adipose tissue with a #15 scalpel blade.  The skin margins were undermined to an appropriate distance in all directions utilizing iris scissors.
A-T Advancement Flap Text: The defect edges were debeveled with a #15 scalpel blade.  Given the location of the defect, shape of the defect and the proximity to free margins an A-T advancement flap was deemed most appropriate.  Using a sterile surgical marker, an appropriate advancement flap was drawn incorporating the defect and placing the expected incisions within the relaxed skin tension lines where possible.    The area thus outlined was incised deep to adipose tissue with a #15 scalpel blade.  The skin margins were undermined to an appropriate distance in all directions utilizing iris scissors.
O-T Advancement Flap Text: The defect edges were debeveled with a #15 scalpel blade.  Given the location of the defect, shape of the defect and the proximity to free margins an O-T advancement flap was deemed most appropriate.  Using a sterile surgical marker, an appropriate advancement flap was drawn incorporating the defect and placing the expected incisions within the relaxed skin tension lines where possible.    The area thus outlined was incised deep to adipose tissue with a #15 scalpel blade.  The skin margins were undermined to an appropriate distance in all directions utilizing iris scissors.
O-L Flap Text: The defect edges were debeveled with a #15 scalpel blade.  Given the location of the defect, shape of the defect and the proximity to free margins an O-L flap was deemed most appropriate.  Using a sterile surgical marker, an appropriate advancement flap was drawn incorporating the defect and placing the expected incisions within the relaxed skin tension lines where possible.    The area thus outlined was incised deep to adipose tissue with a #15 scalpel blade.  The skin margins were undermined to an appropriate distance in all directions utilizing iris scissors.
O-Z Flap Text: The defect edges were debeveled with a #15 scalpel blade.  Given the location of the defect, shape of the defect and the proximity to free margins an O-Z flap was deemed most appropriate.  Using a sterile surgical marker, an appropriate transposition flap was drawn incorporating the defect and placing the expected incisions within the relaxed skin tension lines where possible. The area thus outlined was incised deep to adipose tissue with a #15 scalpel blade.  The skin margins were undermined to an appropriate distance in all directions utilizing iris scissors.
Double O-Z Flap Text: The defect edges were debeveled with a #15 scalpel blade.  Given the location of the defect, shape of the defect and the proximity to free margins a Double O-Z flap was deemed most appropriate.  Using a sterile surgical marker, an appropriate transposition flap was drawn incorporating the defect and placing the expected incisions within the relaxed skin tension lines where possible. The area thus outlined was incised deep to adipose tissue with a #15 scalpel blade.  The skin margins were undermined to an appropriate distance in all directions utilizing iris scissors.
V-Y Flap Text: The defect edges were debeveled with a #15 scalpel blade.  Given the location of the defect, shape of the defect and the proximity to free margins a V-Y flap was deemed most appropriate.  Using a sterile surgical marker, an appropriate advancement flap was drawn incorporating the defect and placing the expected incisions within the relaxed skin tension lines where possible.    The area thus outlined was incised deep to adipose tissue with a #15 scalpel blade.  The skin margins were undermined to an appropriate distance in all directions utilizing iris scissors.
Advancement-Rotation Flap Text: The defect edges were debeveled with a #15 scalpel blade.  Given the location of the defect, shape of the defect and the proximity to free margins an advancement-rotation flap was deemed most appropriate.  Using a sterile surgical marker, an appropriate flap was drawn incorporating the defect and placing the expected incisions within the relaxed skin tension lines where possible. The area thus outlined was incised deep to adipose tissue with a #15 scalpel blade.  The skin margins were undermined to an appropriate distance in all directions utilizing iris scissors.
Mercedes Flap Text: The defect edges were debeveled with a #15 scalpel blade.  Given the location of the defect, shape of the defect and the proximity to free margins a Mercedes flap was deemed most appropriate.  Using a sterile surgical marker, an appropriate advancement flap was drawn incorporating the defect and placing the expected incisions within the relaxed skin tension lines where possible. The area thus outlined was incised deep to adipose tissue with a #15 scalpel blade.  The skin margins were undermined to an appropriate distance in all directions utilizing iris scissors.
Modified Advancement Flap Text: The defect edges were debeveled with a #15 scalpel blade.  Given the location of the defect, shape of the defect and the proximity to free margins a modified advancement flap was deemed most appropriate.  Using a sterile surgical marker, an appropriate advancement flap was drawn incorporating the defect and placing the expected incisions within the relaxed skin tension lines where possible.    The area thus outlined was incised deep to adipose tissue with a #15 scalpel blade.  The skin margins were undermined to an appropriate distance in all directions utilizing iris scissors.
Mucosal Advancement Flap Text: Given the location of the defect, shape of the defect and the proximity to free margins a mucosal advancement flap was deemed most appropriate. Incisions were made with a 15 blade scalpel in the appropriate fashion along the cutaneous vermilion border and the mucosal lip. The remaining actinically damaged mucosal tissue was excised.  The mucosal advancement flap was then elevated to the gingival sulcus with care taken to preserve the neurovascular structures and advanced into the primary defect. Care was taken to ensure that precise realignment of the vermilion border was achieved.
Peng Advancement Flap Text: The defect edges were debeveled with a #15 scalpel blade.  Given the location of the defect, shape of the defect and the proximity to free margins a Peng advancement flap was deemed most appropriate.  Using a sterile surgical marker, an appropriate advancement flap was drawn incorporating the defect and placing the expected incisions within the relaxed skin tension lines where possible. The area thus outlined was incised deep to adipose tissue with a #15 scalpel blade.  The skin margins were undermined to an appropriate distance in all directions utilizing iris scissors.
Hatchet Flap Text: The defect edges were debeveled with a #15 scalpel blade.  Given the location of the defect, shape of the defect and the proximity to free margins a hatchet flap was deemed most appropriate.  Using a sterile surgical marker, an appropriate hatchet flap was drawn incorporating the defect and placing the expected incisions within the relaxed skin tension lines where possible.    The area thus outlined was incised deep to adipose tissue with a #15 scalpel blade.  The skin margins were undermined to an appropriate distance in all directions utilizing iris scissors.
Rotation Flap Text: The defect edges were debeveled with a #15 scalpel blade.  Given the location of the defect, shape of the defect and the proximity to free margins a rotation flap was deemed most appropriate.  Using a sterile surgical marker, an appropriate rotation flap was drawn incorporating the defect and placing the expected incisions within the relaxed skin tension lines where possible.    The area thus outlined was incised deep to adipose tissue with a #15 scalpel blade.  The skin margins were undermined to an appropriate distance in all directions utilizing iris scissors.
Spiral Flap Text: The defect edges were debeveled with a #15 scalpel blade.  Given the location of the defect, shape of the defect and the proximity to free margins a spiral flap was deemed most appropriate.  Using a sterile surgical marker, an appropriate rotation flap was drawn incorporating the defect and placing the expected incisions within the relaxed skin tension lines where possible. The area thus outlined was incised deep to adipose tissue with a #15 scalpel blade.  The skin margins were undermined to an appropriate distance in all directions utilizing iris scissors.
Star Wedge Flap Text: The defect edges were debeveled with a #15 scalpel blade.  Given the location of the defect, shape of the defect and the proximity to free margins a star wedge flap was deemed most appropriate.  Using a sterile surgical marker, an appropriate rotation flap was drawn incorporating the defect and placing the expected incisions within the relaxed skin tension lines where possible. The area thus outlined was incised deep to adipose tissue with a #15 scalpel blade.  The skin margins were undermined to an appropriate distance in all directions utilizing iris scissors.
Transposition Flap Text: The defect edges were debeveled with a #15 scalpel blade.  Given the location of the defect and the proximity to free margins a transposition flap was deemed most appropriate.  Using a sterile surgical marker, an appropriate transposition flap was drawn incorporating the defect.    The area thus outlined was incised deep to adipose tissue with a #15 scalpel blade.  The skin margins were undermined to an appropriate distance in all directions utilizing iris scissors.
Muscle Hinge Flap Text: The defect edges were debeveled with a #15 scalpel blade.  Given the size, depth and location of the defect and the proximity to free margins a muscle hinge flap was deemed most appropriate.  Using a sterile surgical marker, an appropriate hinge flap was drawn incorporating the defect. The area thus outlined was incised with a #15 scalpel blade.  The skin margins were undermined to an appropriate distance in all directions utilizing iris scissors.
Nasal Turnover Hinge Flap Text: The defect edges were debeveled with a #15 scalpel blade.  Given the size, depth, location of the defect and the defect being full thickness a nasal turnover hinge flap was deemed most appropriate.  Using a sterile surgical marker, an appropriate hinge flap was drawn incorporating the defect. The area thus outlined was incised with a #15 scalpel blade. The flap was designed to recreate the nasal mucosal lining and the alar rim. The skin margins were undermined to an appropriate distance in all directions utilizing iris scissors.
Nasalis-Muscle-Based Myocutaneous Island Pedicle Flap Text: Using a #15 blade, an incision was made around the donor flap to the level of the nasalis muscle. Wide lateral undermining was then performed in both the subcutaneous plane above the nasalis muscle, and in a submuscular plane just above periosteum. This allowed the formation of a free nasalis muscle axial pedicle (based on the angular artery) which was still attached to the actual cutaneous flap, increasing its mobility and vascular viability. Hemostasis was obtained with pinpoint electrocoagulation. The flap was mobilized into position and the pivotal anchor points positioned and stabilized with buried interrupted sutures. Subcutaneous and dermal tissues were closed in a multilayered fashion with sutures. Tissue redundancies were excised, and the epidermal edges were apposed without significant tension and sutured with sutures.
Orbicularis Oris Muscle Flap Text: The defect edges were debeveled with a #15 scalpel blade.  Given that the defect affected the competency of the oral sphincter an obicularis oris muscle flap was deemed most appropriate to restore this competency and normal muscle function.  Using a sterile surgical marker, an appropriate flap was drawn incorporating the defect. The area thus outlined was incised with a #15 scalpel blade.
Melolabial Transposition Flap Text: The defect edges were debeveled with a #15 scalpel blade.  Given the location of the defect and the proximity to free margins a melolabial flap was deemed most appropriate.  Using a sterile surgical marker, an appropriate melolabial transposition flap was drawn incorporating the defect.    The area thus outlined was incised deep to adipose tissue with a #15 scalpel blade.  The skin margins were undermined to an appropriate distance in all directions utilizing iris scissors.
Rhombic Flap Text: The defect edges were debeveled with a #15 scalpel blade.  Given the location of the defect and the proximity to free margins a rhombic flap was deemed most appropriate.  Using a sterile surgical marker, an appropriate rhombic flap was drawn incorporating the defect.    The area thus outlined was incised deep to adipose tissue with a #15 scalpel blade.  The skin margins were undermined to an appropriate distance in all directions utilizing iris scissors.
Rhomboid Transposition Flap Text: The defect edges were debeveled with a #15 scalpel blade.  Given the location of the defect and the proximity to free margins a rhomboid transposition flap was deemed most appropriate.  Using a sterile surgical marker, an appropriate rhomboid flap was drawn incorporating the defect.    The area thus outlined was incised deep to adipose tissue with a #15 scalpel blade.  The skin margins were undermined to an appropriate distance in all directions utilizing iris scissors.
Bi-Rhombic Flap Text: The defect edges were debeveled with a #15 scalpel blade.  Given the location of the defect and the proximity to free margins a bi-rhombic flap was deemed most appropriate.  Using a sterile surgical marker, an appropriate rhombic flap was drawn incorporating the defect. The area thus outlined was incised deep to adipose tissue with a #15 scalpel blade.  The skin margins were undermined to an appropriate distance in all directions utilizing iris scissors.
Helical Rim Advancement Flap Text: The defect edges were debeveled with a #15 blade scalpel.  Given the location of the defect and the proximity to free margins (helical rim) a double helical rim advancement flap was deemed most appropriate.  Using a sterile surgical marker, the appropriate advancement flaps were drawn incorporating the defect and placing the expected incisions between the helical rim and antihelix where possible.  The area thus outlined was incised through and through with a #15 scalpel blade.  With a skin hook and iris scissors, the flaps were gently and sharply undermined and freed up.
Bilateral Helical Rim Advancement Flap Text: The defect edges were debeveled with a #15 blade scalpel.  Given the location of the defect and the proximity to free margins (helical rim) a bilateral helical rim advancement flap was deemed most appropriate.  Using a sterile surgical marker, the appropriate advancement flaps were drawn incorporating the defect and placing the expected incisions between the helical rim and antihelix where possible.  The area thus outlined was incised through and through with a #15 scalpel blade.  With a skin hook and iris scissors, the flaps were gently and sharply undermined and freed up.
Ear Star Wedge Flap Text: The defect edges were debeveled with a #15 blade scalpel.  Given the location of the defect and the proximity to free margins (helical rim) an ear star wedge flap was deemed most appropriate.  Using a sterile surgical marker, the appropriate flap was drawn incorporating the defect and placing the expected incisions between the helical rim and antihelix where possible.  The area thus outlined was incised through and through with a #15 scalpel blade.
Banner Transposition Flap Text: The defect edges were debeveled with a #15 scalpel blade.  Given the location of the defect and the proximity to free margins a Banner transposition flap was deemed most appropriate.  Using a sterile surgical marker, an appropriate flap drawn around the defect. The area thus outlined was incised deep to adipose tissue with a #15 scalpel blade.  The skin margins were undermined to an appropriate distance in all directions utilizing iris scissors.
Bilobed Flap Text: The defect edges were debeveled with a #15 scalpel blade.  Given the location of the defect and the proximity to free margins a bilobe flap was deemed most appropriate.  Using a sterile surgical marker, an appropriate bilobe flap drawn around the defect.    The area thus outlined was incised deep to adipose tissue with a #15 scalpel blade.  The skin margins were undermined to an appropriate distance in all directions utilizing iris scissors.
Bilobed Transposition Flap Text: The defect edges were debeveled with a #15 scalpel blade.  Given the location of the defect and the proximity to free margins a bilobed transposition flap was deemed most appropriate.  Using a sterile surgical marker, an appropriate bilobe flap drawn around the defect.    The area thus outlined was incised deep to adipose tissue with a #15 scalpel blade.  The skin margins were undermined to an appropriate distance in all directions utilizing iris scissors.
Trilobed Flap Text: The defect edges were debeveled with a #15 scalpel blade.  Given the location of the defect and the proximity to free margins a trilobed flap was deemed most appropriate.  Using a sterile surgical marker, an appropriate trilobed flap drawn around the defect.    The area thus outlined was incised deep to adipose tissue with a #15 scalpel blade.  The skin margins were undermined to an appropriate distance in all directions utilizing iris scissors.
Dorsal Nasal Flap Text: The defect edges were debeveled with a #15 scalpel blade.  Given the location of the defect and the proximity to free margins a dorsal nasal flap was deemed most appropriate.  Using a sterile surgical marker, an appropriate dorsal nasal flap was drawn around the defect.    The area thus outlined was incised deep to adipose tissue with a #15 scalpel blade.  The skin margins were undermined to an appropriate distance in all directions utilizing iris scissors.
Island Pedicle Flap Text: The defect edges were debeveled with a #15 scalpel blade.  Given the location of the defect, shape of the defect and the proximity to free margins an island pedicle advancement flap was deemed most appropriate.  Using a sterile surgical marker, an appropriate advancement flap was drawn incorporating the defect, outlining the appropriate donor tissue and placing the expected incisions within the relaxed skin tension lines where possible.    The area thus outlined was incised deep to adipose tissue with a #15 scalpel blade.  The skin margins were undermined to an appropriate distance in all directions around the primary defect and laterally outward around the island pedicle utilizing iris scissors.  There was minimal undermining beneath the pedicle flap.
Island Pedicle Flap With Canthal Suspension Text: The defect edges were debeveled with a #15 scalpel blade.  Given the location of the defect, shape of the defect and the proximity to free margins an island pedicle advancement flap was deemed most appropriate.  Using a sterile surgical marker, an appropriate advancement flap was drawn incorporating the defect, outlining the appropriate donor tissue and placing the expected incisions within the relaxed skin tension lines where possible. The area thus outlined was incised deep to adipose tissue with a #15 scalpel blade.  The skin margins were undermined to an appropriate distance in all directions around the primary defect and laterally outward around the island pedicle utilizing iris scissors.  There was minimal undermining beneath the pedicle flap. A suspension suture was placed in the canthal tendon to prevent tension and prevent ectropion.
Alar Island Pedicle Flap Text: The defect edges were debeveled with a #15 scalpel blade.  Given the location of the defect, shape of the defect and the proximity to the alar rim an island pedicle advancement flap was deemed most appropriate.  Using a sterile surgical marker, an appropriate advancement flap was drawn incorporating the defect, outlining the appropriate donor tissue and placing the expected incisions within the nasal ala running parallel to the alar rim. The area thus outlined was incised with a #15 scalpel blade.  The skin margins were undermined minimally to an appropriate distance in all directions around the primary defect and laterally outward around the island pedicle utilizing iris scissors.  There was minimal undermining beneath the pedicle flap.
Double Island Pedicle Flap Text: The defect edges were debeveled with a #15 scalpel blade.  Given the location of the defect, shape of the defect and the proximity to free margins a double island pedicle advancement flap was deemed most appropriate.  Using a sterile surgical marker, an appropriate advancement flap was drawn incorporating the defect, outlining the appropriate donor tissue and placing the expected incisions within the relaxed skin tension lines where possible.    The area thus outlined was incised deep to adipose tissue with a #15 scalpel blade.  The skin margins were undermined to an appropriate distance in all directions around the primary defect and laterally outward around the island pedicle utilizing iris scissors.  There was minimal undermining beneath the pedicle flap.
Island Pedicle Flap-Requiring Vessel Identification Text: The defect edges were debeveled with a #15 scalpel blade.  Given the location of the defect, shape of the defect and the proximity to free margins an island pedicle advancement flap was deemed most appropriate.  Using a sterile surgical marker, an appropriate advancement flap was drawn, based on the axial vessel mentioned above, incorporating the defect, outlining the appropriate donor tissue and placing the expected incisions within the relaxed skin tension lines where possible.    The area thus outlined was incised deep to adipose tissue with a #15 scalpel blade.  The skin margins were undermined to an appropriate distance in all directions around the primary defect and laterally outward around the island pedicle utilizing iris scissors.  There was minimal undermining beneath the pedicle flap.
Keystone Flap Text: The defect edges were debeveled with a #15 scalpel blade.  Given the location of the defect, shape of the defect a keystone flap was deemed most appropriate.  Using a sterile surgical marker, an appropriate keystone flap was drawn incorporating the defect, outlining the appropriate donor tissue and placing the expected incisions within the relaxed skin tension lines where possible. The area thus outlined was incised deep to adipose tissue with a #15 scalpel blade.  The skin margins were undermined to an appropriate distance in all directions around the primary defect and laterally outward around the flap utilizing iris scissors.
O-T Plasty Text: The defect edges were debeveled with a #15 scalpel blade.  Given the location of the defect, shape of the defect and the proximity to free margins an O-T plasty was deemed most appropriate.  Using a sterile surgical marker, an appropriate O-T plasty was drawn incorporating the defect and placing the expected incisions within the relaxed skin tension lines where possible.    The area thus outlined was incised deep to adipose tissue with a #15 scalpel blade.  The skin margins were undermined to an appropriate distance in all directions utilizing iris scissors.
O-Z Plasty Text: The defect edges were debeveled with a #15 scalpel blade.  Given the location of the defect, shape of the defect and the proximity to free margins an O-Z plasty (double transposition flap) was deemed most appropriate.  Using a sterile surgical marker, the appropriate transposition flaps were drawn incorporating the defect and placing the expected incisions within the relaxed skin tension lines where possible.    The area thus outlined was incised deep to adipose tissue with a #15 scalpel blade.  The skin margins were undermined to an appropriate distance in all directions utilizing iris scissors.  Hemostasis was achieved with electrocautery.  The flaps were then transposed into place, one clockwise and the other counterclockwise, and anchored with interrupted buried subcutaneous sutures.
Double O-Z Plasty Text: The defect edges were debeveled with a #15 scalpel blade.  Given the location of the defect, shape of the defect and the proximity to free margins a Double O-Z plasty (double transposition flap) was deemed most appropriate.  Using a sterile surgical marker, the appropriate transposition flaps were drawn incorporating the defect and placing the expected incisions within the relaxed skin tension lines where possible. The area thus outlined was incised deep to adipose tissue with a #15 scalpel blade.  The skin margins were undermined to an appropriate distance in all directions utilizing iris scissors.  Hemostasis was achieved with electrocautery.  The flaps were then transposed into place, one clockwise and the other counterclockwise, and anchored with interrupted buried subcutaneous sutures.
V-Y Plasty Text: The defect edges were debeveled with a #15 scalpel blade.  Given the location of the defect, shape of the defect and the proximity to free margins an V-Y advancement flap was deemed most appropriate.  Using a sterile surgical marker, an appropriate advancement flap was drawn incorporating the defect and placing the expected incisions within the relaxed skin tension lines where possible.    The area thus outlined was incised deep to adipose tissue with a #15 scalpel blade.  The skin margins were undermined to an appropriate distance in all directions utilizing iris scissors.
H Plasty Text: Given the location of the defect, shape of the defect and the proximity to free margins a H-plasty was deemed most appropriate for repair.  Using a sterile surgical marker, the appropriate advancement arms of the H-plasty were drawn incorporating the defect and placing the expected incisions within the relaxed skin tension lines where possible. The area thus outlined was incised deep to adipose tissue with a #15 scalpel blade. The skin margins were undermined to an appropriate distance in all directions utilizing iris scissors.  The opposing advancement arms were then advanced into place in opposite direction and anchored with interrupted buried subcutaneous sutures.
W Plasty Text: The lesion was extirpated to the level of the fat with a #15 scalpel blade.  Given the location of the defect, shape of the defect and the proximity to free margins a W-plasty was deemed most appropriate for repair.  Using a sterile surgical marker, the appropriate transposition arms of the W-plasty were drawn incorporating the defect and placing the expected incisions within the relaxed skin tension lines where possible.    The area thus outlined was incised deep to adipose tissue with a #15 scalpel blade.  The skin margins were undermined to an appropriate distance in all directions utilizing iris scissors.  The opposing transposition arms were then transposed into place in opposite direction and anchored with interrupted buried subcutaneous sutures.
Z Plasty Text: The lesion was extirpated to the level of the fat with a #15 scalpel blade.  Given the location of the defect, shape of the defect and the proximity to free margins a Z-plasty was deemed most appropriate for repair.  Using a sterile surgical marker, the appropriate transposition arms of the Z-plasty were drawn incorporating the defect and placing the expected incisions within the relaxed skin tension lines where possible.    The area thus outlined was incised deep to adipose tissue with a #15 scalpel blade.  The skin margins were undermined to an appropriate distance in all directions utilizing iris scissors.  The opposing transposition arms were then transposed into place in opposite direction and anchored with interrupted buried subcutaneous sutures.
Zygomaticofacial Flap Text: Given the location of the defect, shape of the defect and the proximity to free margins a zygomaticofacial flap was deemed most appropriate for repair.  Using a sterile surgical marker, the appropriate flap was drawn incorporating the defect and placing the expected incisions within the relaxed skin tension lines where possible. The area thus outlined was incised deep to adipose tissue with a #15 scalpel blade with preservation of a vascular pedicle.  The skin margins were undermined to an appropriate distance in all directions utilizing iris scissors.  The flap was then placed into the defect and anchored with interrupted buried subcutaneous sutures.
Cheek Interpolation Flap Text: A decision was made to reconstruct the defect utilizing an interpolation axial flap and a staged reconstruction.  A telfa template was made of the defect.  This telfa template was then used to outline the Cheek Interpolation flap.  The donor area for the pedicle flap was then injected with anesthesia.  The flap was excised through the skin and subcutaneous tissue down to the layer of the underlying musculature.  The interpolation flap was carefully excised within this deep plane to maintain its blood supply.  The edges of the donor site were undermined.   The donor site was closed in a primary fashion.  The pedicle was then rotated into position and sutured.  Once the tube was sutured into place, adequate blood supply was confirmed with blanching and refill.  The pedicle was then wrapped with xeroform gauze and dressed appropriately with a telfa and gauze bandage to ensure continued blood supply and protect the attached pedicle.
Cheek-To-Nose Interpolation Flap Text: A decision was made to reconstruct the defect utilizing an interpolation axial flap and a staged reconstruction.  A telfa template was made of the defect.  This telfa template was then used to outline the Cheek-To-Nose Interpolation flap.  The donor area for the pedicle flap was then injected with anesthesia.  The flap was excised through the skin and subcutaneous tissue down to the layer of the underlying musculature.  The interpolation flap was carefully excised within this deep plane to maintain its blood supply.  The edges of the donor site were undermined.   The donor site was closed in a primary fashion.  The pedicle was then rotated into position and sutured.  Once the tube was sutured into place, adequate blood supply was confirmed with blanching and refill.  The pedicle was then wrapped with xeroform gauze and dressed appropriately with a telfa and gauze bandage to ensure continued blood supply and protect the attached pedicle.
Interpolation Flap Text: A decision was made to reconstruct the defect utilizing an interpolation axial flap and a staged reconstruction.  A telfa template was made of the defect.  This telfa template was then used to outline the interpolation flap.  The donor area for the pedicle flap was then injected with anesthesia.  The flap was excised through the skin and subcutaneous tissue down to the layer of the underlying musculature.  The interpolation flap was carefully excised within this deep plane to maintain its blood supply.  The edges of the donor site were undermined.   The donor site was closed in a primary fashion.  The pedicle was then rotated into position and sutured.  Once the tube was sutured into place, adequate blood supply was confirmed with blanching and refill.  The pedicle was then wrapped with xeroform gauze and dressed appropriately with a telfa and gauze bandage to ensure continued blood supply and protect the attached pedicle.
Melolabial Interpolation Flap Text: A decision was made to reconstruct the defect utilizing an interpolation axial flap and a staged reconstruction.  A telfa template was made of the defect.  This telfa template was then used to outline the melolabial interpolation flap.  The donor area for the pedicle flap was then injected with anesthesia.  The flap was excised through the skin and subcutaneous tissue down to the layer of the underlying musculature.  The pedicle flap was carefully excised within this deep plane to maintain its blood supply.  The edges of the donor site were undermined.   The donor site was closed in a primary fashion.  The pedicle was then rotated into position and sutured.  Once the tube was sutured into place, adequate blood supply was confirmed with blanching and refill.  The pedicle was then wrapped with xeroform gauze and dressed appropriately with a telfa and gauze bandage to ensure continued blood supply and protect the attached pedicle.
Mastoid Interpolation Flap Text: A decision was made to reconstruct the defect utilizing an interpolation axial flap and a staged reconstruction.  A telfa template was made of the defect.  This telfa template was then used to outline the mastoid interpolation flap.  The donor area for the pedicle flap was then injected with anesthesia.  The flap was excised through the skin and subcutaneous tissue down to the layer of the underlying musculature.  The pedicle flap was carefully excised within this deep plane to maintain its blood supply.  The edges of the donor site were undermined.   The donor site was closed in a primary fashion.  The pedicle was then rotated into position and sutured.  Once the tube was sutured into place, adequate blood supply was confirmed with blanching and refill.  The pedicle was then wrapped with xeroform gauze and dressed appropriately with a telfa and gauze bandage to ensure continued blood supply and protect the attached pedicle.
Posterior Auricular Interpolation Flap Text: A decision was made to reconstruct the defect utilizing an interpolation axial flap and a staged reconstruction.  A telfa template was made of the defect.  This telfa template was then used to outline the posterior auricular interpolation flap.  The donor area for the pedicle flap was then injected with anesthesia.  The flap was excised through the skin and subcutaneous tissue down to the layer of the underlying musculature.  The pedicle flap was carefully excised within this deep plane to maintain its blood supply.  The edges of the donor site were undermined.   The donor site was closed in a primary fashion.  The pedicle was then rotated into position and sutured.  Once the tube was sutured into place, adequate blood supply was confirmed with blanching and refill.  The pedicle was then wrapped with xeroform gauze and dressed appropriately with a telfa and gauze bandage to ensure continued blood supply and protect the attached pedicle.
Paramedian Forehead Flap Text: A decision was made to reconstruct the defect utilizing an interpolation axial flap and a staged reconstruction.  A telfa template was made of the defect.  This telfa template was then used to outline the paramedian forehead pedicle flap.  The donor area for the pedicle flap was then injected with anesthesia.  The flap was excised through the skin and subcutaneous tissue down to the layer of the underlying musculature.  The pedicle flap was carefully excised within this deep plane to maintain its blood supply.  The edges of the donor site were undermined.   The donor site was closed in a primary fashion.  The pedicle was then rotated into position and sutured.  Once the tube was sutured into place, adequate blood supply was confirmed with blanching and refill.  The pedicle was then wrapped with xeroform gauze and dressed appropriately with a telfa and gauze bandage to ensure continued blood supply and protect the attached pedicle.
Lip Wedge Excision Repair Text: Given the location of the defect and the proximity to free margins a full thickness wedge repair was deemed most appropriate.  Using a sterile surgical marker, the appropriate repair was drawn incorporating the defect and placing the expected incisions perpendicular to the vermilion border.  The vermilion border was also meticulously outlined to ensure appropriate reapproximation during the repair.  The area thus outlined was incised through and through with a #15 scalpel blade.  The muscularis and dermis were reaproximated with deep sutures following hemostasis. Care was taken to realign the vermilion border before proceeding with the superficial closure.  Once the vermilion was realigned the superfical and mucosal closure was finished.
Ftsg Text: The defect edges were debeveled with a #15 scalpel blade.  Given the location of the defect, shape of the defect and the proximity to free margins a full thickness skin graft was deemed most appropriate.  Using a sterile surgical marker, the primary defect shape was transferred to the donor site. The area thus outlined was incised deep to adipose tissue with a #15 scalpel blade.  The harvested graft was then trimmed of adipose tissue until only dermis and epidermis was left.  The skin margins of the secondary defect were undermined to an appropriate distance in all directions utilizing iris scissors.  The secondary defect was closed with interrupted buried subcutaneous sutures.  The skin edges were then re-apposed with running  sutures.  The skin graft was then placed in the primary defect and oriented appropriately.
Split-Thickness Skin Graft Text: The defect edges were debeveled with a #15 scalpel blade.  Given the location of the defect, shape of the defect and the proximity to free margins a split thickness skin graft was deemed most appropriate.  Using a sterile surgical marker, the primary defect shape was transferred to the donor site. The split thickness graft was then harvested.  The skin graft was then placed in the primary defect and oriented appropriately.
Burow's Graft Text: The defect edges were debeveled with a #15 scalpel blade.  Given the location of the defect, shape of the defect, the proximity to free margins and the presence of a standing cone deformity a Burow's skin graft was deemed most appropriate. The standing cone was removed and this tissue was then trimmed to the shape of the primary defect. The adipose tissue was also removed until only dermis and epidermis were left.  The skin margins of the secondary defect were undermined to an appropriate distance in all directions utilizing iris scissors.  The secondary defect was closed with interrupted buried subcutaneous sutures.  The skin edges were then re-apposed with running  sutures.  The skin graft was then placed in the primary defect and oriented appropriately.
Cartilage Graft Text: The defect edges were debeveled with a #15 scalpel blade.  Given the location of the defect, shape of the defect, the fact the defect involved a full thickness cartilage defect a cartilage graft was deemed most appropriate.  An appropriate donor site was identified, cleansed, and anesthetized. The cartilage graft was then harvested and transferred to the recipient site, oriented appropriately and then sutured into place.  The secondary defect was then repaired using a primary closure.
Composite Graft Text: The defect edges were debeveled with a #15 scalpel blade.  Given the location of the defect, shape of the defect, the proximity to free margins and the fact the defect was full thickness a composite graft was deemed most appropriate.  The defect was outline and then transferred to the donor site.  A full thickness graft was then excised from the donor site. The graft was then placed in the primary defect, oriented appropriately and then sutured into place.  The secondary defect was then repaired using a primary closure.
Epidermal Autograft Text: The defect edges were debeveled with a #15 scalpel blade.  Given the location of the defect, shape of the defect and the proximity to free margins an epidermal autograft was deemed most appropriate.  Using a sterile surgical marker, the primary defect shape was transferred to the donor site. The epidermal graft was then harvested.  The skin graft was then placed in the primary defect and oriented appropriately.
Dermal Autograft Text: The defect edges were debeveled with a #15 scalpel blade.  Given the location of the defect, shape of the defect and the proximity to free margins a dermal autograft was deemed most appropriate.  Using a sterile surgical marker, the primary defect shape was transferred to the donor site. The area thus outlined was incised deep to adipose tissue with a #15 scalpel blade.  The harvested graft was then trimmed of adipose and epidermal tissue until only dermis was left.  The skin graft was then placed in the primary defect and oriented appropriately.
Skin Substitute Text: The defect edges were debeveled with a #15 scalpel blade.  Given the location of the defect, shape of the defect and the proximity to free margins a skin substitute graft was deemed most appropriate.  The graft material was trimmed to fit the size of the defect. The graft was then placed in the primary defect and oriented appropriately.
Tissue Cultured Epidermal Autograft Text: The defect edges were debeveled with a #15 scalpel blade.  Given the location of the defect, shape of the defect and the proximity to free margins a tissue cultured epidermal autograft was deemed most appropriate.  The graft was then trimmed to fit the size of the defect.  The graft was then placed in the primary defect and oriented appropriately.
Xenograft Text: The defect edges were debeveled with a #15 scalpel blade.  Given the location of the defect, shape of the defect and the proximity to free margins a xenograft was deemed most appropriate.  The graft was then trimmed to fit the size of the defect.  The graft was then placed in the primary defect and oriented appropriately.
Purse String (Intermediate) Text: Given the location of the defect and the characteristics of the surrounding skin a purse string intermediate closure was deemed most appropriate.  Undermining was performed circumfirentially around the surgical defect.  A purse string suture was then placed and tightened.
Purse String (Simple) Text: Given the location of the defect and the characteristics of the surrounding skin a purse string simple closure was deemed most appropriate.  Undermining was performed circumferentially around the surgical defect.  A purse string suture was then placed and tightened.
Partial Purse String (Intermediate) Text: Given the location of the defect and the characteristics of the surrounding skin an intermediate purse string closure was deemed most appropriate.  Undermining was performed circumferentially around the surgical defect.  A purse string suture was then placed and tightened. Wound tension of the circular defect prevented complete closure of the wound.
Partial Purse String (Simple) Text: Given the location of the defect and the characteristics of the surrounding skin a simple purse string closure was deemed most appropriate.  Undermining was performed circumferentially around the surgical defect.  A purse string suture was then placed and tightened. Wound tension of the circular defect prevented complete closure of the wound.
Complex Repair And Single Advancement Flap Text: The defect edges were debeveled with a #15 scalpel blade.  The primary defect was closed partially with a complex linear closure.  Given the location of the remaining defect, shape of the defect and the proximity to free margins a single advancement flap was deemed most appropriate for complete closure of the defect.  Using a sterile surgical marker, an appropriate advancement flap was drawn incorporating the defect and placing the expected incisions within the relaxed skin tension lines where possible.    The area thus outlined was incised deep to adipose tissue with a #15 scalpel blade.  The skin margins were undermined to an appropriate distance in all directions utilizing iris scissors.
Complex Repair And Double Advancement Flap Text: The defect edges were debeveled with a #15 scalpel blade.  The primary defect was closed partially with a complex linear closure.  Given the location of the remaining defect, shape of the defect and the proximity to free margins a double advancement flap was deemed most appropriate for complete closure of the defect.  Using a sterile surgical marker, an appropriate advancement flap was drawn incorporating the defect and placing the expected incisions within the relaxed skin tension lines where possible.    The area thus outlined was incised deep to adipose tissue with a #15 scalpel blade.  The skin margins were undermined to an appropriate distance in all directions utilizing iris scissors.
Complex Repair And Modified Advancement Flap Text: The defect edges were debeveled with a #15 scalpel blade.  The primary defect was closed partially with a complex linear closure.  Given the location of the remaining defect, shape of the defect and the proximity to free margins a modified advancement flap was deemed most appropriate for complete closure of the defect.  Using a sterile surgical marker, an appropriate advancement flap was drawn incorporating the defect and placing the expected incisions within the relaxed skin tension lines where possible.    The area thus outlined was incised deep to adipose tissue with a #15 scalpel blade.  The skin margins were undermined to an appropriate distance in all directions utilizing iris scissors.
Complex Repair And A-T Advancement Flap Text: The defect edges were debeveled with a #15 scalpel blade.  The primary defect was closed partially with a complex linear closure.  Given the location of the remaining defect, shape of the defect and the proximity to free margins an A-T advancement flap was deemed most appropriate for complete closure of the defect.  Using a sterile surgical marker, an appropriate advancement flap was drawn incorporating the defect and placing the expected incisions within the relaxed skin tension lines where possible.    The area thus outlined was incised deep to adipose tissue with a #15 scalpel blade.  The skin margins were undermined to an appropriate distance in all directions utilizing iris scissors.
Complex Repair And O-T Advancement Flap Text: The defect edges were debeveled with a #15 scalpel blade.  The primary defect was closed partially with a complex linear closure.  Given the location of the remaining defect, shape of the defect and the proximity to free margins an O-T advancement flap was deemed most appropriate for complete closure of the defect.  Using a sterile surgical marker, an appropriate advancement flap was drawn incorporating the defect and placing the expected incisions within the relaxed skin tension lines where possible.    The area thus outlined was incised deep to adipose tissue with a #15 scalpel blade.  The skin margins were undermined to an appropriate distance in all directions utilizing iris scissors.
Complex Repair And O-L Flap Text: The defect edges were debeveled with a #15 scalpel blade.  The primary defect was closed partially with a complex linear closure.  Given the location of the remaining defect, shape of the defect and the proximity to free margins an O-L flap was deemed most appropriate for complete closure of the defect.  Using a sterile surgical marker, an appropriate flap was drawn incorporating the defect and placing the expected incisions within the relaxed skin tension lines where possible.    The area thus outlined was incised deep to adipose tissue with a #15 scalpel blade.  The skin margins were undermined to an appropriate distance in all directions utilizing iris scissors.
Complex Repair And Bilobe Flap Text: The defect edges were debeveled with a #15 scalpel blade.  The primary defect was closed partially with a complex linear closure.  Given the location of the remaining defect, shape of the defect and the proximity to free margins a bilobe flap was deemed most appropriate for complete closure of the defect.  Using a sterile surgical marker, an appropriate advancement flap was drawn incorporating the defect and placing the expected incisions within the relaxed skin tension lines where possible.    The area thus outlined was incised deep to adipose tissue with a #15 scalpel blade.  The skin margins were undermined to an appropriate distance in all directions utilizing iris scissors.
Complex Repair And Melolabial Flap Text: The defect edges were debeveled with a #15 scalpel blade.  The primary defect was closed partially with a complex linear closure.  Given the location of the remaining defect, shape of the defect and the proximity to free margins a melolabial flap was deemed most appropriate for complete closure of the defect.  Using a sterile surgical marker, an appropriate advancement flap was drawn incorporating the defect and placing the expected incisions within the relaxed skin tension lines where possible.    The area thus outlined was incised deep to adipose tissue with a #15 scalpel blade.  The skin margins were undermined to an appropriate distance in all directions utilizing iris scissors.
Complex Repair And Rotation Flap Text: The defect edges were debeveled with a #15 scalpel blade.  The primary defect was closed partially with a complex linear closure.  Given the location of the remaining defect, shape of the defect and the proximity to free margins a rotation flap was deemed most appropriate for complete closure of the defect.  Using a sterile surgical marker, an appropriate advancement flap was drawn incorporating the defect and placing the expected incisions within the relaxed skin tension lines where possible.    The area thus outlined was incised deep to adipose tissue with a #15 scalpel blade.  The skin margins were undermined to an appropriate distance in all directions utilizing iris scissors.
Complex Repair And Rhombic Flap Text: The defect edges were debeveled with a #15 scalpel blade.  The primary defect was closed partially with a complex linear closure.  Given the location of the remaining defect, shape of the defect and the proximity to free margins a rhombic flap was deemed most appropriate for complete closure of the defect.  Using a sterile surgical marker, an appropriate advancement flap was drawn incorporating the defect and placing the expected incisions within the relaxed skin tension lines where possible.    The area thus outlined was incised deep to adipose tissue with a #15 scalpel blade.  The skin margins were undermined to an appropriate distance in all directions utilizing iris scissors.
Complex Repair And Transposition Flap Text: The defect edges were debeveled with a #15 scalpel blade.  The primary defect was closed partially with a complex linear closure.  Given the location of the remaining defect, shape of the defect and the proximity to free margins a transposition flap was deemed most appropriate for complete closure of the defect.  Using a sterile surgical marker, an appropriate advancement flap was drawn incorporating the defect and placing the expected incisions within the relaxed skin tension lines where possible.    The area thus outlined was incised deep to adipose tissue with a #15 scalpel blade.  The skin margins were undermined to an appropriate distance in all directions utilizing iris scissors.
Complex Repair And V-Y Plasty Text: The defect edges were debeveled with a #15 scalpel blade.  The primary defect was closed partially with a complex linear closure.  Given the location of the remaining defect, shape of the defect and the proximity to free margins a V-Y plasty was deemed most appropriate for complete closure of the defect.  Using a sterile surgical marker, an appropriate advancement flap was drawn incorporating the defect and placing the expected incisions within the relaxed skin tension lines where possible.    The area thus outlined was incised deep to adipose tissue with a #15 scalpel blade.  The skin margins were undermined to an appropriate distance in all directions utilizing iris scissors.
Complex Repair And M Plasty Text: The defect edges were debeveled with a #15 scalpel blade.  The primary defect was closed partially with a complex linear closure.  Given the location of the remaining defect, shape of the defect and the proximity to free margins an M plasty was deemed most appropriate for complete closure of the defect.  Using a sterile surgical marker, an appropriate advancement flap was drawn incorporating the defect and placing the expected incisions within the relaxed skin tension lines where possible.    The area thus outlined was incised deep to adipose tissue with a #15 scalpel blade.  The skin margins were undermined to an appropriate distance in all directions utilizing iris scissors.
Complex Repair And Double M Plasty Text: The defect edges were debeveled with a #15 scalpel blade.  The primary defect was closed partially with a complex linear closure.  Given the location of the remaining defect, shape of the defect and the proximity to free margins a double M plasty was deemed most appropriate for complete closure of the defect.  Using a sterile surgical marker, an appropriate advancement flap was drawn incorporating the defect and placing the expected incisions within the relaxed skin tension lines where possible.    The area thus outlined was incised deep to adipose tissue with a #15 scalpel blade.  The skin margins were undermined to an appropriate distance in all directions utilizing iris scissors.
Complex Repair And W Plasty Text: The defect edges were debeveled with a #15 scalpel blade.  The primary defect was closed partially with a complex linear closure.  Given the location of the remaining defect, shape of the defect and the proximity to free margins a W plasty was deemed most appropriate for complete closure of the defect.  Using a sterile surgical marker, an appropriate advancement flap was drawn incorporating the defect and placing the expected incisions within the relaxed skin tension lines where possible.    The area thus outlined was incised deep to adipose tissue with a #15 scalpel blade.  The skin margins were undermined to an appropriate distance in all directions utilizing iris scissors.
Complex Repair And Z Plasty Text: The defect edges were debeveled with a #15 scalpel blade.  The primary defect was closed partially with a complex linear closure.  Given the location of the remaining defect, shape of the defect and the proximity to free margins a Z plasty was deemed most appropriate for complete closure of the defect.  Using a sterile surgical marker, an appropriate advancement flap was drawn incorporating the defect and placing the expected incisions within the relaxed skin tension lines where possible.    The area thus outlined was incised deep to adipose tissue with a #15 scalpel blade.  The skin margins were undermined to an appropriate distance in all directions utilizing iris scissors.
Complex Repair And Dorsal Nasal Flap Text: The defect edges were debeveled with a #15 scalpel blade.  The primary defect was closed partially with a complex linear closure.  Given the location of the remaining defect, shape of the defect and the proximity to free margins a dorsal nasal flap was deemed most appropriate for complete closure of the defect.  Using a sterile surgical marker, an appropriate flap was drawn incorporating the defect and placing the expected incisions within the relaxed skin tension lines where possible.    The area thus outlined was incised deep to adipose tissue with a #15 scalpel blade.  The skin margins were undermined to an appropriate distance in all directions utilizing iris scissors.
Complex Repair And Ftsg Text: The defect edges were debeveled with a #15 scalpel blade.  The primary defect was closed partially with a complex linear closure.  Given the location of the defect, shape of the defect and the proximity to free margins a full thickness skin graft was deemed most appropriate to repair the remaining defect.  The graft was trimmed to fit the size of the remaining defect.  The graft was then placed in the primary defect, oriented appropriately, and sutured into place.
Complex Repair And Burow's Graft Text: The defect edges were debeveled with a #15 scalpel blade.  The primary defect was closed partially with a complex linear closure.  Given the location of the defect, shape of the defect, the proximity to free margins and the presence of a standing cone deformity a Burow's graft was deemed most appropriate to repair the remaining defect.  The graft was trimmed to fit the size of the remaining defect.  The graft was then placed in the primary defect, oriented appropriately, and sutured into place.
Complex Repair And Split-Thickness Skin Graft Text: The defect edges were debeveled with a #15 scalpel blade.  The primary defect was closed partially with a complex linear closure.  Given the location of the defect, shape of the defect and the proximity to free margins a split thickness skin graft was deemed most appropriate to repair the remaining defect.  The graft was trimmed to fit the size of the remaining defect.  The graft was then placed in the primary defect, oriented appropriately, and sutured into place.
Complex Repair And Epidermal Autograft Text: The defect edges were debeveled with a #15 scalpel blade.  The primary defect was closed partially with a complex linear closure.  Given the location of the defect, shape of the defect and the proximity to free margins an epidermal autograft was deemed most appropriate to repair the remaining defect.  The graft was trimmed to fit the size of the remaining defect.  The graft was then placed in the primary defect, oriented appropriately, and sutured into place.
Complex Repair And Dermal Autograft Text: The defect edges were debeveled with a #15 scalpel blade.  The primary defect was closed partially with a complex linear closure.  Given the location of the defect, shape of the defect and the proximity to free margins an dermal autograft was deemed most appropriate to repair the remaining defect.  The graft was trimmed to fit the size of the remaining defect.  The graft was then placed in the primary defect, oriented appropriately, and sutured into place.
Complex Repair And Tissue Cultured Epidermal Autograft Text: The defect edges were debeveled with a #15 scalpel blade.  The primary defect was closed partially with a complex linear closure.  Given the location of the defect, shape of the defect and the proximity to free margins an tissue cultured epidermal autograft was deemed most appropriate to repair the remaining defect.  The graft was trimmed to fit the size of the remaining defect.  The graft was then placed in the primary defect, oriented appropriately, and sutured into place.
Complex Repair And Xenograft Text: The defect edges were debeveled with a #15 scalpel blade.  The primary defect was closed partially with a complex linear closure.  Given the location of the defect, shape of the defect and the proximity to free margins a xenograft was deemed most appropriate to repair the remaining defect.  The graft was trimmed to fit the size of the remaining defect.  The graft was then placed in the primary defect, oriented appropriately, and sutured into place.
Complex Repair And Skin Substitute Graft Text: The defect edges were debeveled with a #15 scalpel blade.  The primary defect was closed partially with a complex linear closure.  Given the location of the remaining defect, shape of the defect and the proximity to free margins a skin substitute graft was deemed most appropriate to repair the remaining defect.  The graft was trimmed to fit the size of the remaining defect.  The graft was then placed in the primary defect, oriented appropriately, and sutured into place.
Path Notes (To The Dermatopathologist): Please check margins.
Consent: Verbal consent was obtained from the patient. The risks and benefits to therapy were discussed in detail. Specifically, the risks of infection, scarring, bleeding, prolonged wound healing, incomplete removal, allergy to anesthesia, nerve injury and recurrence were addressed. Prior to the procedure, the treatment site was clearly identified and confirmed by the patient. All components of Universal Protocol/PAUSE Rule completed.
Post-Care Instructions: I reviewed with the patient in detail post-care instructions. Patient is not to engage in any heavy lifting, exercise, or swimming for the next 14 days. Should the patient develop any fevers, chills, bleeding, severe pain patient will contact the office immediately.
Where Do You Want The Question To Include Opioid Counseling Located?: Case Summary Tab
Information: Selecting Yes will display possible errors in your note based on the variables you have selected. This validation is only offered as a suggestion for you. PLEASE NOTE THAT THE VALIDATION TEXT WILL BE REMOVED WHEN YOU FINALIZE YOUR NOTE. IF YOU WANT TO FAX A PRELIMINARY NOTE YOU WILL NEED TO TOGGLE THIS TO 'NO' IF YOU DO NOT WANT IT IN YOUR FAXED NOTE.

## 2021-03-17 ENCOUNTER — APPOINTMENT (RX ONLY)
Dept: URBAN - METROPOLITAN AREA CLINIC 4 | Facility: CLINIC | Age: 86
Setting detail: DERMATOLOGY
End: 2021-03-17

## 2021-03-17 DIAGNOSIS — Z48.02 ENCOUNTER FOR REMOVAL OF SUTURES: ICD-10-CM

## 2021-03-17 PROCEDURE — ? SUTURE REMOVAL (GLOBAL PERIOD)

## 2021-03-17 PROCEDURE — 99024 POSTOP FOLLOW-UP VISIT: CPT

## 2021-03-17 ASSESSMENT — LOCATION SIMPLE DESCRIPTION DERM: LOCATION SIMPLE: NECK

## 2021-03-17 ASSESSMENT — LOCATION DETAILED DESCRIPTION DERM: LOCATION DETAILED: LEFT CENTRAL LATERAL NECK

## 2021-03-17 ASSESSMENT — LOCATION ZONE DERM: LOCATION ZONE: NECK

## 2021-03-17 NOTE — PROCEDURE: SUTURE REMOVAL (GLOBAL PERIOD)
Detail Level: Detailed
Add 92132 Cpt? (Important Note: In 2017 The Use Of 86770 Is Being Tracked By Cms To Determine Future Global Period Reimbursement For Global Periods): yes

## 2021-04-02 ENCOUNTER — HOSPITAL ENCOUNTER (OUTPATIENT)
Dept: RADIOLOGY | Facility: MEDICAL CENTER | Age: 86
End: 2021-04-02
Attending: FAMILY MEDICINE
Payer: MEDICARE

## 2021-04-02 DIAGNOSIS — Z12.31 VISIT FOR SCREENING MAMMOGRAM: ICD-10-CM

## 2021-04-02 PROCEDURE — 77063 BREAST TOMOSYNTHESIS BI: CPT

## 2021-08-26 ENCOUNTER — PATIENT MESSAGE (OUTPATIENT)
Dept: HEALTH INFORMATION MANAGEMENT | Facility: OTHER | Age: 86
End: 2021-08-26

## 2021-08-30 ENCOUNTER — TELEPHONE (OUTPATIENT)
Dept: HEALTH INFORMATION MANAGEMENT | Facility: OTHER | Age: 86
End: 2021-08-30

## 2021-09-08 ENCOUNTER — APPOINTMENT (RX ONLY)
Dept: URBAN - METROPOLITAN AREA CLINIC 4 | Facility: CLINIC | Age: 86
Setting detail: DERMATOLOGY
End: 2021-09-08

## 2021-09-08 DIAGNOSIS — Z85.828 PERSONAL HISTORY OF OTHER MALIGNANT NEOPLASM OF SKIN: ICD-10-CM | Status: RESOLVED

## 2021-09-08 DIAGNOSIS — L81.4 OTHER MELANIN HYPERPIGMENTATION: ICD-10-CM

## 2021-09-08 DIAGNOSIS — D18.0 HEMANGIOMA: ICD-10-CM

## 2021-09-08 DIAGNOSIS — L82.1 OTHER SEBORRHEIC KERATOSIS: ICD-10-CM

## 2021-09-08 PROBLEM — D18.01 HEMANGIOMA OF SKIN AND SUBCUTANEOUS TISSUE: Status: ACTIVE | Noted: 2021-09-08

## 2021-09-08 PROCEDURE — ? OBSERVATION

## 2021-09-08 PROCEDURE — 99212 OFFICE O/P EST SF 10 MIN: CPT

## 2021-09-08 ASSESSMENT — LOCATION ZONE DERM
LOCATION ZONE: TRUNK
LOCATION ZONE: NECK
LOCATION ZONE: LEG

## 2021-09-08 ASSESSMENT — LOCATION DETAILED DESCRIPTION DERM
LOCATION DETAILED: LEFT CENTRAL LATERAL NECK
LOCATION DETAILED: PERIUMBILICAL SKIN
LOCATION DETAILED: INFERIOR THORACIC SPINE
LOCATION DETAILED: SUPERIOR THORACIC SPINE
LOCATION DETAILED: LEFT DISTAL PRETIBIAL REGION
LOCATION DETAILED: SUPERIOR LUMBAR SPINE
LOCATION DETAILED: RIGHT INFERIOR MEDIAL UPPER BACK

## 2021-09-08 ASSESSMENT — LOCATION SIMPLE DESCRIPTION DERM
LOCATION SIMPLE: ABDOMEN
LOCATION SIMPLE: UPPER BACK
LOCATION SIMPLE: RIGHT UPPER BACK
LOCATION SIMPLE: NECK
LOCATION SIMPLE: LEFT PRETIBIAL REGION
LOCATION SIMPLE: LOWER BACK

## 2021-09-08 NOTE — PROCEDURE: OBSERVATION
Detail Level: Detailed
Size Of Lesion In Cm (Optional): 0
Body Location Override (Optional - Billing Will Still Be Based On Selected Body Map Location If Applicable): Left upper shin
Body Location Override (Optional - Billing Will Still Be Based On Selected Body Map Location If Applicable): Right mid lower back

## 2021-09-08 NOTE — PROCEDURE: REASSURANCE
Detail Level: Zone
Include Location In Plan?: Yes
Hide Include Location In Plan Question?: No
Detail Level: Generalized

## 2021-09-09 PROBLEM — R79.89 ABNORMAL SERUM THYROID STIMULATING HORMONE (TSH) LEVEL: Status: ACTIVE | Noted: 2021-09-09

## 2021-09-09 PROBLEM — M81.0 OSTEOPOROSIS: Status: ACTIVE | Noted: 2021-09-09

## 2021-11-08 ENCOUNTER — APPOINTMENT (OUTPATIENT)
Dept: MEDICAL GROUP | Facility: MEDICAL CENTER | Age: 86
End: 2021-11-08
Payer: MEDICARE

## 2021-11-12 ENCOUNTER — TELEPHONE (OUTPATIENT)
Dept: SCHEDULING | Facility: IMAGING CENTER | Age: 86
End: 2021-11-12

## 2021-11-17 ENCOUNTER — OFFICE VISIT (OUTPATIENT)
Dept: MEDICAL GROUP | Facility: MEDICAL CENTER | Age: 86
End: 2021-11-17
Payer: MEDICARE

## 2021-11-17 VITALS
HEIGHT: 63 IN | BODY MASS INDEX: 18.43 KG/M2 | WEIGHT: 104 LBS | OXYGEN SATURATION: 95 % | SYSTOLIC BLOOD PRESSURE: 122 MMHG | TEMPERATURE: 97.8 F | DIASTOLIC BLOOD PRESSURE: 72 MMHG | HEART RATE: 75 BPM

## 2021-11-17 DIAGNOSIS — Z13.1 DIABETES MELLITUS SCREENING: ICD-10-CM

## 2021-11-17 DIAGNOSIS — M81.0 AGE RELATED OSTEOPOROSIS, UNSPECIFIED PATHOLOGICAL FRACTURE PRESENCE: ICD-10-CM

## 2021-11-17 DIAGNOSIS — Z00.00 ENCOUNTER FOR ANNUAL WELLNESS EXAM IN MEDICARE PATIENT: ICD-10-CM

## 2021-11-17 DIAGNOSIS — R79.89 ABNORMAL SERUM THYROID STIMULATING HORMONE (TSH) LEVEL: ICD-10-CM

## 2021-11-17 DIAGNOSIS — Z85.828 HISTORY OF BASAL CELL CARCINOMA (BCC) OF SKIN: ICD-10-CM

## 2021-11-17 DIAGNOSIS — B00.2 ORAL HERPES SIMPLEX, NOT CURRENTLY ACTIVE: ICD-10-CM

## 2021-11-17 PROCEDURE — 99204 OFFICE O/P NEW MOD 45 MIN: CPT | Performed by: STUDENT IN AN ORGANIZED HEALTH CARE EDUCATION/TRAINING PROGRAM

## 2021-11-17 ASSESSMENT — PATIENT HEALTH QUESTIONNAIRE - PHQ9: CLINICAL INTERPRETATION OF PHQ2 SCORE: 0

## 2021-11-17 ASSESSMENT — ENCOUNTER SYMPTOMS
SHORTNESS OF BREATH: 0
CHILLS: 0
FEVER: 0

## 2021-11-17 ASSESSMENT — FIBROSIS 4 INDEX: FIB4 SCORE: 2.89

## 2021-11-17 NOTE — PROGRESS NOTES
"Subjective:     CC:  Diagnoses of Abnormal serum thyroid stimulating hormone (TSH) level, Encounter for annual wellness exam in Medicare patient, Diabetes mellitus screening, and Age related osteoporosis, unspecified pathological fracture presence were pertinent to this visit.    HISTORY OF THE PRESENT ILLNESS: Patient is a 86 y.o. female. This pleasant patient is here today to establish care and discuss the following;    Problem   Abnormal Serum Thyroid Stimulating Hormone (Tsh) Level    Patient has hx of elevated TSH, she has never been T4     Osteoporosis    Dexa scan done 12/18/19 showed lumbar spine has a T score of -3.1, The proximal left femur has a T score of -2.5, distal left forearm has a T score of -3.9. According to the World Health Organization classification, bone mineral density of this patient is osteoporosis with high fracture risk for the lumbar spine, left femur, and left forearm. Pt takes Bone-up supplement, Vitamin D3. Pt is on regular exercises. Bone density scan is due 2024-per previous documentation            Current Outpatient Medications Ordered in Epic   Medication Sig Dispense Refill   • ACYCLOVIR by Does not apply route 2 Times a Day.       No current [facility]-ordered facility-administered medications on file.         ROS:   Review of Systems   Constitutional: Negative for chills and fever.   Respiratory: Negative for shortness of breath.    Cardiovascular: Negative for chest pain.         Objective:     Exam: /72 (BP Location: Right arm, Patient Position: Sitting, BP Cuff Size: Adult)   Pulse 75   Temp 36.6 °C (97.8 °F) (Temporal)   Ht 1.588 m (5' 2.5\")   Wt 47.2 kg (104 lb)   SpO2 95%  Body mass index is 18.72 kg/m².    Physical Exam  Constitutional:       Appearance: Normal appearance.   Cardiovascular:      Rate and Rhythm: Normal rate and regular rhythm.      Heart sounds: Normal heart sounds.   Pulmonary:      Effort: Pulmonary effort is normal.      Breath sounds: Normal " breath sounds.   Musculoskeletal:      Cervical back: Normal range of motion and neck supple.   Neurological:      Mental Status: She is alert.               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     Chronic, not controlled  -discussed possible need for medication, but patient states she prefers not to   -TSH with reflex ordered          Relevant Orders    TSH WITH REFLEX TO FT4    Osteoporosis     Chronic-controlled   -patient takes supplements (shark liver oil, bone up)  -patient is not interested in prescription medication             Other Visit Diagnoses     Encounter for annual wellness exam in Medicare patient        Relevant Orders    CBC WITH DIFFERENTIAL    TSH WITH REFLEX TO FT4    Comp Metabolic Panel    Diabetes mellitus screening        Relevant Orders    Comp Metabolic Panel              Return in about 10 months (around 9/19/2022) for F/U Medicare Wellness.    Please note that this dictation was created using voice recognition software. I have made every reasonable attempt to correct obvious errors, but I expect that there are errors of grammar and possibly content that I did not discover before finalizing the note.

## 2021-11-17 NOTE — ASSESSMENT & PLAN NOTE
Chronic, not controlled  -discussed possible need for medication, but patient states she prefers not to   -TSH with reflex ordered

## 2021-11-17 NOTE — ASSESSMENT & PLAN NOTE
Chronic-controlled   -patient takes supplements (shark liver oil, bone up)  -patient is not interested in prescription medication

## 2022-02-25 ENCOUNTER — HOSPITAL ENCOUNTER (OUTPATIENT)
Dept: LAB | Facility: MEDICAL CENTER | Age: 87
End: 2022-02-25
Attending: STUDENT IN AN ORGANIZED HEALTH CARE EDUCATION/TRAINING PROGRAM
Payer: MEDICARE

## 2022-02-25 DIAGNOSIS — Z00.00 ENCOUNTER FOR ANNUAL WELLNESS EXAM IN MEDICARE PATIENT: ICD-10-CM

## 2022-02-25 DIAGNOSIS — R79.89 ABNORMAL SERUM THYROID STIMULATING HORMONE (TSH) LEVEL: ICD-10-CM

## 2022-02-25 DIAGNOSIS — Z13.1 DIABETES MELLITUS SCREENING: ICD-10-CM

## 2022-02-25 LAB
ALBUMIN SERPL BCP-MCNC: 4.5 G/DL (ref 3.2–4.9)
ALBUMIN/GLOB SERPL: 1.8 G/DL
ALP SERPL-CCNC: 68 U/L (ref 30–99)
ALT SERPL-CCNC: 16 U/L (ref 2–50)
ANION GAP SERPL CALC-SCNC: 11 MMOL/L (ref 7–16)
AST SERPL-CCNC: 27 U/L (ref 12–45)
BASOPHILS # BLD AUTO: 1.2 % (ref 0–1.8)
BASOPHILS # BLD: 0.06 K/UL (ref 0–0.12)
BILIRUB SERPL-MCNC: 0.7 MG/DL (ref 0.1–1.5)
BUN SERPL-MCNC: 10 MG/DL (ref 8–22)
CALCIUM SERPL-MCNC: 9.6 MG/DL (ref 8.5–10.5)
CHLORIDE SERPL-SCNC: 98 MMOL/L (ref 96–112)
CO2 SERPL-SCNC: 26 MMOL/L (ref 20–33)
CREAT SERPL-MCNC: 0.79 MG/DL (ref 0.5–1.4)
EOSINOPHIL # BLD AUTO: 0.1 K/UL (ref 0–0.51)
EOSINOPHIL NFR BLD: 2 % (ref 0–6.9)
ERYTHROCYTE [DISTWIDTH] IN BLOOD BY AUTOMATED COUNT: 49.7 FL (ref 35.9–50)
GLOBULIN SER CALC-MCNC: 2.5 G/DL (ref 1.9–3.5)
GLUCOSE SERPL-MCNC: 91 MG/DL (ref 65–99)
HCT VFR BLD AUTO: 43.3 % (ref 37–47)
HGB BLD-MCNC: 14 G/DL (ref 12–16)
IMM GRANULOCYTES # BLD AUTO: 0.02 K/UL (ref 0–0.11)
IMM GRANULOCYTES NFR BLD AUTO: 0.4 % (ref 0–0.9)
LYMPHOCYTES # BLD AUTO: 1.14 K/UL (ref 1–4.8)
LYMPHOCYTES NFR BLD: 22.7 % (ref 22–41)
MCH RBC QN AUTO: 29.2 PG (ref 27–33)
MCHC RBC AUTO-ENTMCNC: 32.3 G/DL (ref 33.6–35)
MCV RBC AUTO: 90.2 FL (ref 81.4–97.8)
MONOCYTES # BLD AUTO: 0.37 K/UL (ref 0–0.85)
MONOCYTES NFR BLD AUTO: 7.4 % (ref 0–13.4)
NEUTROPHILS # BLD AUTO: 3.33 K/UL (ref 2–7.15)
NEUTROPHILS NFR BLD: 66.3 % (ref 44–72)
NRBC # BLD AUTO: 0 K/UL
NRBC BLD-RTO: 0 /100 WBC
PLATELET # BLD AUTO: 107 K/UL (ref 164–446)
PMV BLD AUTO: 10.6 FL (ref 9–12.9)
POTASSIUM SERPL-SCNC: 4.2 MMOL/L (ref 3.6–5.5)
PROT SERPL-MCNC: 7 G/DL (ref 6–8.2)
RBC # BLD AUTO: 4.8 M/UL (ref 4.2–5.4)
SODIUM SERPL-SCNC: 135 MMOL/L (ref 135–145)
T4 FREE SERPL-MCNC: 1.43 NG/DL (ref 0.93–1.7)
TSH SERPL DL<=0.005 MIU/L-ACNC: 5.67 UIU/ML (ref 0.38–5.33)
WBC # BLD AUTO: 5 K/UL (ref 4.8–10.8)

## 2022-02-25 PROCEDURE — 80053 COMPREHEN METABOLIC PANEL: CPT

## 2022-02-25 PROCEDURE — 36415 COLL VENOUS BLD VENIPUNCTURE: CPT

## 2022-02-25 PROCEDURE — 85025 COMPLETE CBC W/AUTO DIFF WBC: CPT

## 2022-02-25 PROCEDURE — 84443 ASSAY THYROID STIM HORMONE: CPT

## 2022-02-25 PROCEDURE — 84439 ASSAY OF FREE THYROXINE: CPT

## 2022-03-22 ENCOUNTER — OFFICE VISIT (OUTPATIENT)
Dept: MEDICAL GROUP | Facility: MEDICAL CENTER | Age: 87
End: 2022-03-22
Payer: MEDICARE

## 2022-03-22 VITALS
WEIGHT: 106 LBS | HEART RATE: 82 BPM | HEIGHT: 63 IN | DIASTOLIC BLOOD PRESSURE: 60 MMHG | SYSTOLIC BLOOD PRESSURE: 110 MMHG | OXYGEN SATURATION: 98 % | BODY MASS INDEX: 18.78 KG/M2 | TEMPERATURE: 97 F

## 2022-03-22 DIAGNOSIS — D69.6 THROMBOCYTOPENIA (HCC): ICD-10-CM

## 2022-03-22 PROCEDURE — G0439 PPPS, SUBSEQ VISIT: HCPCS | Performed by: STUDENT IN AN ORGANIZED HEALTH CARE EDUCATION/TRAINING PROGRAM

## 2022-03-22 ASSESSMENT — FIBROSIS 4 INDEX: FIB4 SCORE: 5.36

## 2022-03-22 ASSESSMENT — PATIENT HEALTH QUESTIONNAIRE - PHQ9: CLINICAL INTERPRETATION OF PHQ2 SCORE: 0

## 2022-03-22 ASSESSMENT — ENCOUNTER SYMPTOMS: GENERAL WELL-BEING: GOOD

## 2022-03-22 ASSESSMENT — ACTIVITIES OF DAILY LIVING (ADL): BATHING_REQUIRES_ASSISTANCE: 0

## 2022-03-22 NOTE — PROGRESS NOTES
Chief Complaint   Patient presents with   • Annual Exam       HPI:  Denae Rivas is a 86 y.o. 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     No current facility-administered medications for this visit.          Current supplements as per medication list.     Allergies: Tetracycline    Current social contact/activities:      She  reports that she has never smoked. She has never used smokeless tobacco. She reports that she does not drink alcohol and does not use drugs.  Counseling given: Not Answered      DPA/Advanced Directive:  Patient has Living Will, but it is not on file. Instructed to bring in a copy to scan into their chart.    ROS:    Gait: Uses no assistive device  Ostomy: No  Other tubes: No  Amputations: No  Chronic oxygen use: No  Last eye exam: April 2021  Wears hearing aids: No   : Reports urinary leakage during the last 6 months that has not interfered at all with their daily activities or sleep.    Screening:    Depression Screening  Little interest or pleasure in doing things?  0 - not at all  Feeling down, depressed , or hopeless? 0 - not at all  Patient Health Questionnaire Score: 0     If depressive symptoms identified deferred to follow up visit unless specifically addressed in assessment and plan.    Interpretation of PHQ-9 Total Score   Score Severity   1-4 No Depression   5-9 Mild Depression   10-14 Moderate Depression   15-19 Moderately Severe Depression   20-27 Severe Depression    Screening for Cognitive Impairment  Three Minute Recall (daughter, heaven, mountain) 1/3    Shar clock face with all 12 numbers and set the  hands to show 10 past 11.  Yes    Cognitive concerns identified deferred for follow up unless specifically addressed in assessment and plan.    Fall Risk Assessment  Has the patient had two or more falls in the last year or any fall with injury in the last year?  No    Safety Assessment  Throw rugs on floor.  Yes  Handrails on all stairs.  Yes  Good lighting in all hallways.  Yes  Difficulty hearing.  No  Patient counseled about all safety risks that were identified.    Functional Assessment ADLs  Are there any barriers preventing you from cooking for yourself or meeting nutritional needs?  No.    Are there any barriers preventing you from driving safely or obtaining transportation?  No.    Are there any barriers preventing you from using a telephone or calling for help?  No.    Are there any barriers preventing you from shopping?  No.    Are there any barriers preventing you from taking care of your own finances?  No.    Are there any barriers preventing you from managing your medications?  No.    Are there any barriers preventing you from showering, bathing or dressing yourself?  No.    Are you currently engaging in any exercise or physical activity?  Yes.     What is your perception of your health?  Good.      Health Maintenance Summary          Overdue - PAP SMEAR (Every 3 Years) Overdue - never done    No completion history exists for this topic.          Overdue - IMM ZOSTER VACCINES (1 of 2) Overdue - never done    No completion history exists for this topic.          Overdue - IMM DTaP/Tdap/Td Vaccine (1 - Tdap) Overdue since 3/30/2001    03/29/2001  Imm Admin: TD Vaccine          Overdue - IMM PNEUMOCOCCAL VACCINE: 65+ Years (1 of 1 - PPSV23) Overdue - never done    No completion history exists for this topic.          Overdue - COLORECTAL CANCER SCREENING (COLON CANCER SCREENING ANNUAL FIT - Yearly) Overdue since 3/16/2014    03/16/2013  OCCULT BLOOD FECES IMMUNOASSAY          Overdue - IMM INFLUENZA (1)  Overdue - never done    No completion history exists for this topic.          MAMMOGRAM (Yearly) Due soon on 4/2/2022 04/02/2021  MA-SCREENING MAMMO BILAT W/IMPLANTS W/SCARLET W/CAD    09/11/2012  MA-SCREENING MAMMOGRAM W/ CAD    08/31/2011  MA-SCREEING MAMMOGRAM W/ CAD          Postponed - COVID-19 Vaccine (1) Postponed until 8/19/2022    No completion history exists for this topic.          Annual Wellness Visit (Every 366 Days) Next due on 9/10/2022    09/09/2021  Level of Service: ANNUAL WELLNESS VISIT-INCLUDES PPPS SUBSEQUE*          BONE DENSITY (Every 5 Years) Tentatively due on 12/18/2024 12/18/2019  DS-BONE DENSITY STUDY (DEXA)    09/25/2015  DS-BONE DENSITY STUDY (DEXA)    09/18/2014  DS-BONE DENSITY STUDY (DEXA)    10/12/2012  DS-BONE DENSITY STUDY (DEXA)    08/31/2011  DS-BONE DENSITY STUDY (DEXA)          IMM HEP B VACCINE (Series Information) Aged Out    No completion history exists for this topic.          IMM MENINGOCOCCAL VACCINE (MCV4) (Series Information) Aged Out    No completion history exists for this topic.                Patient Care Team:  Herman Neal D.O. as PCP - General (Internal Medicine)        Social History     Tobacco Use   • Smoking status: Never Smoker   • Smokeless tobacco: Never Used   Substance Use Topics   • Alcohol use: No     Comment: quit 30 years    • Drug use: No     Family History   Problem Relation Age of Onset   • Diabetes Other    • Heart Disease Other    • Hypertension Other    • Heart Disease Father      She  has a past medical history of Anesthesia, CATARACT, and Shingles (2009).   Past Surgical History:   Procedure Laterality Date   • ABDOMINOPLASTY  1/24/2011    Performed by [facility] [place] at SURGERY [facility]S   • BREAST IMPLANT REVISION  1/24/2011    Performed by [facility] at [facility] ORS   • LIPOSUCTION  1/24/2011    Performed by [facility] at [facility] OR[facility]   • CAPSULECTOMY  1/24/2011    Performed by  [facility] at [facility]   • BREAST IMPLANT REVISION  9/27/2010    Performed by [facility] at [facility]   • MASTOPEXY  9/27/2010    Performed by [facility] at [facility]   • CATARACT EXTRACTION WITH IOL  2002   • OTHER  1985    rhinoplasty rhytidectomy   • MAMMOPLASTY AUGMENTATION  1976/85/94   • [place] BREAST AUGMENTATION WITH IMPLANT  76,84,94,10       Exam:   There were no vitals taken for this visit. There is no height or weight on file to calculate BMI.    Physical Exam  Constitutional:       Appearance: Normal appearance.   HENT:      Head: Normocephalic and atraumatic.      Right Ear: Tympanic membrane and ear canal normal.      Left Ear: Tympanic membrane and ear canal normal.      Mouth/Throat:      Mouth: Mucous membranes are moist.      Pharynx: Oropharynx is clear.   Eyes:      Extraocular Movements: Extraocular movements intact.   Neck:      Thyroid: No thyromegaly.   Cardiovascular:      Rate and Rhythm: Normal rate and regular rhythm.      Heart sounds: Normal heart sounds.   Pulmonary:      Effort: Pulmonary effort is normal.      Breath sounds: Normal breath sounds.   Abdominal:      General: Abdomen is flat. Bowel sounds are normal.      Palpations: Abdomen is soft. There is no mass.      Tenderness: There is no abdominal tenderness. There is no guarding.   Musculoskeletal:      Cervical back: Normal range of motion and neck supple.      Right lower leg: No edema.      Left lower leg: No edema.   Lymphadenopathy:      Cervical: No cervical adenopathy.   Skin:     General: Skin is warm and dry.   Neurological:      General: No focal deficit present.      Mental Status: She is alert.           Hearing excellent.    Dentition good  Alert, oriented in no acute distress.  Eye contact is good, speech goal directed, affect calm    Assessment and Plan. The following treatment and monitoring plan is recommended:    There are no diagnoses linked  to this encounter.    Services suggested: No services needed at this time  Health Care Screening: Age-appropriate preventive services recommended by USPTF and ACIP covered by Medicare were discussed today. Services ordered if indicated and agreed upon by the patient.  Referrals offered: Community-based lifestyle interventions to reduce health risks and promote self-management and wellness, fall prevention, nutrition, physical activity, tobacco-use cessation, weight loss, and mental health services as per orders if indicated.    Discussion today about general wellness and lifestyle habits:    · Prevent falls and reduce trip hazards; Cautioned about securing or removing rugs.  · Have a working fire alarm and carbon monoxide detector;   · Engage in regular physical activity and social activities     Follow-up: No follow-ups on file.

## 2022-03-23 ENCOUNTER — APPOINTMENT (RX ONLY)
Dept: URBAN - METROPOLITAN AREA CLINIC 4 | Facility: CLINIC | Age: 87
Setting detail: DERMATOLOGY
End: 2022-03-23

## 2022-03-23 DIAGNOSIS — I87.2 VENOUS INSUFFICIENCY (CHRONIC) (PERIPHERAL): ICD-10-CM

## 2022-03-23 DIAGNOSIS — L82.1 OTHER SEBORRHEIC KERATOSIS: ICD-10-CM

## 2022-03-23 DIAGNOSIS — L81.7 PIGMENTED PURPURIC DERMATOSIS: ICD-10-CM

## 2022-03-23 PROCEDURE — 99212 OFFICE O/P EST SF 10 MIN: CPT

## 2022-03-23 PROCEDURE — ? DIAGNOSIS COMMENT

## 2022-03-23 PROCEDURE — ? COUNSELING

## 2022-03-23 ASSESSMENT — LOCATION DETAILED DESCRIPTION DERM
LOCATION DETAILED: RIGHT DISTAL PRETIBIAL REGION
LOCATION DETAILED: UPPER STERNUM
LOCATION DETAILED: RIGHT MEDIAL SUPERIOR CHEST
LOCATION DETAILED: LEFT DISTAL PRETIBIAL REGION

## 2022-03-23 ASSESSMENT — LOCATION SIMPLE DESCRIPTION DERM
LOCATION SIMPLE: RIGHT PRETIBIAL REGION
LOCATION SIMPLE: LEFT PRETIBIAL REGION
LOCATION SIMPLE: CHEST

## 2022-03-23 ASSESSMENT — LOCATION ZONE DERM
LOCATION ZONE: LEG
LOCATION ZONE: TRUNK

## 2022-06-06 ENCOUNTER — HOSPITAL ENCOUNTER (OUTPATIENT)
Dept: RADIOLOGY | Facility: MEDICAL CENTER | Age: 87
End: 2022-06-06
Attending: STUDENT IN AN ORGANIZED HEALTH CARE EDUCATION/TRAINING PROGRAM
Payer: MEDICARE

## 2022-06-06 DIAGNOSIS — Z12.31 ENCOUNTER FOR SCREENING MAMMOGRAM FOR MALIGNANT NEOPLASM OF BREAST: ICD-10-CM

## 2022-06-06 PROCEDURE — 77063 BREAST TOMOSYNTHESIS BI: CPT

## 2022-06-07 ENCOUNTER — APPOINTMENT (RX ONLY)
Dept: URBAN - METROPOLITAN AREA CLINIC 4 | Facility: CLINIC | Age: 87
Setting detail: DERMATOLOGY
End: 2022-06-07

## 2022-06-07 DIAGNOSIS — I83.9 ASYMPTOMATIC VARICOSE VEINS OF LOWER EXTREMITIES: ICD-10-CM

## 2022-06-07 DIAGNOSIS — D18.0 HEMANGIOMA: ICD-10-CM

## 2022-06-07 DIAGNOSIS — L82.1 OTHER SEBORRHEIC KERATOSIS: ICD-10-CM

## 2022-06-07 DIAGNOSIS — I87.2 VENOUS INSUFFICIENCY (CHRONIC) (PERIPHERAL): ICD-10-CM

## 2022-06-07 DIAGNOSIS — Z85.828 PERSONAL HISTORY OF OTHER MALIGNANT NEOPLASM OF SKIN: ICD-10-CM

## 2022-06-07 DIAGNOSIS — L81.4 OTHER MELANIN HYPERPIGMENTATION: ICD-10-CM

## 2022-06-07 DIAGNOSIS — L44.8 OTHER SPECIFIED PAPULOSQUAMOUS DISORDERS: ICD-10-CM

## 2022-06-07 PROBLEM — I83.93 ASYMPTOMATIC VARICOSE VEINS OF BILATERAL LOWER EXTREMITIES: Status: ACTIVE | Noted: 2022-06-07

## 2022-06-07 PROBLEM — D18.01 HEMANGIOMA OF SKIN AND SUBCUTANEOUS TISSUE: Status: ACTIVE | Noted: 2022-06-07

## 2022-06-07 PROCEDURE — ? OBSERVATION

## 2022-06-07 PROCEDURE — ? COUNSELING

## 2022-06-07 PROCEDURE — 99213 OFFICE O/P EST LOW 20 MIN: CPT

## 2022-06-07 ASSESSMENT — LOCATION DETAILED DESCRIPTION DERM
LOCATION DETAILED: RIGHT DISTAL CALF
LOCATION DETAILED: SUPERIOR LUMBAR SPINE
LOCATION DETAILED: LEFT PROXIMAL DORSAL FOREARM
LOCATION DETAILED: RIGHT DISTAL PRETIBIAL REGION
LOCATION DETAILED: RIGHT INFERIOR MEDIAL UPPER BACK
LOCATION DETAILED: SUPERIOR THORACIC SPINE
LOCATION DETAILED: LEFT DISTAL PRETIBIAL REGION
LOCATION DETAILED: LEFT DISTAL CALF
LOCATION DETAILED: PERIUMBILICAL SKIN

## 2022-06-07 ASSESSMENT — LOCATION ZONE DERM
LOCATION ZONE: LEG
LOCATION ZONE: TRUNK
LOCATION ZONE: ARM

## 2022-06-07 ASSESSMENT — LOCATION SIMPLE DESCRIPTION DERM
LOCATION SIMPLE: LEFT CALF
LOCATION SIMPLE: RIGHT CALF
LOCATION SIMPLE: LOWER BACK
LOCATION SIMPLE: LEFT PRETIBIAL REGION
LOCATION SIMPLE: UPPER BACK
LOCATION SIMPLE: ABDOMEN
LOCATION SIMPLE: RIGHT UPPER BACK
LOCATION SIMPLE: RIGHT PRETIBIAL REGION
LOCATION SIMPLE: LEFT FOREARM

## 2022-06-30 PROBLEM — R79.89 ABNORMAL SERUM THYROID STIMULATING HORMONE (TSH) LEVEL: Status: RESOLVED | Noted: 2021-09-09 | Resolved: 2022-06-30

## 2022-06-30 PROBLEM — R03.0 ELEVATED BP WITHOUT DIAGNOSIS OF HYPERTENSION: Status: ACTIVE | Noted: 2022-06-30

## 2022-06-30 PROBLEM — E03.8 SUBCLINICAL HYPOTHYROIDISM: Status: ACTIVE | Noted: 2022-06-30

## 2022-07-19 ENCOUNTER — OFFICE VISIT (OUTPATIENT)
Dept: MEDICAL GROUP | Facility: MEDICAL CENTER | Age: 87
End: 2022-07-19
Payer: MEDICARE

## 2022-07-19 VITALS
OXYGEN SATURATION: 95 % | DIASTOLIC BLOOD PRESSURE: 62 MMHG | TEMPERATURE: 98 F | BODY MASS INDEX: 19.83 KG/M2 | SYSTOLIC BLOOD PRESSURE: 100 MMHG | HEART RATE: 78 BPM | WEIGHT: 105 LBS | HEIGHT: 61 IN

## 2022-07-19 DIAGNOSIS — Z02.89 ENCOUNTER FOR COMPLETION OF FORM WITH PATIENT: ICD-10-CM

## 2022-07-19 PROBLEM — R03.0 ELEVATED BP WITHOUT DIAGNOSIS OF HYPERTENSION: Status: RESOLVED | Noted: 2022-06-30 | Resolved: 2022-07-19

## 2022-07-19 PROCEDURE — 99213 OFFICE O/P EST LOW 20 MIN: CPT | Performed by: STUDENT IN AN ORGANIZED HEALTH CARE EDUCATION/TRAINING PROGRAM

## 2022-07-19 ASSESSMENT — ENCOUNTER SYMPTOMS
SHORTNESS OF BREATH: 0
CHILLS: 0
FEVER: 0

## 2022-07-19 ASSESSMENT — FIBROSIS 4 INDEX: FIB4 SCORE: 5.36

## 2022-07-19 NOTE — PROGRESS NOTES
"Subjective:     CC: DMV form completion     HPI:   Denae presents today for the following;    Problem   Encounter for Completion of Form With Patient    DMV form completion      Elevated Bp Without Diagnosis of Hypertension (Resolved)       Current Outpatient Medications Ordered in Epic   Medication Sig Dispense Refill   • acyclovir (ZOVIRAX) 400 MG tablet Take 400 mg by mouth 3 times a day as needed.     • Nutritional Supplements (NUTRITIONAL SUPPLEMENT PO) Take  by mouth. Shark Liver Oil - 2 capsules two times daily     • Iodine, Kelp, (KELP PO) Take 1 Capsule by mouth 1 time a day as needed.     • Nutritional Supplements (NUTRITIONAL SUPPLEMENT PO) Take 2 Capsules by mouth in the morning, at noon, and at bedtime. \"Bone Up\" (calcium, vitamin D, vitamin K, magnesium)     • Cholecalciferol (VITAMIN D3) 125 MCG (5000 UT) Cap Take 1 Capsule by mouth every day.     • Nutritional Supplements (NUTRITIONAL SUPPLEMENT PO) Take 1 Capsule by mouth every day. [place] Oil       No current Epic-ordered facility-administered medications on file.           ROS:  Review of Systems   Constitutional: Negative for chills and fever.   Respiratory: Negative for shortness of breath.    Cardiovascular: Negative for chest pain.       Objective:     Exam:  /62 (BP Location: Left arm, Patient Position: Sitting, BP Cuff Size: Adult)   Pulse 78   Temp 36.7 °C (98 °F) (Temporal)   Ht 1.549 m (5' 1\")   Wt 47.6 kg (105 lb)   SpO2 95%   BMI 19.84 kg/m²  Body mass index is 19.84 kg/m².    Physical Exam  Constitutional:       General: She is not in acute distress.     Appearance: She is not ill-appearing.   Cardiovascular:      Rate and Rhythm: Normal rate and regular rhythm.      Pulses: Normal pulses.   Pulmonary:      Effort: Pulmonary effort is normal.   Neurological:      Mental Status: She is alert.   Psychiatric:         Mood and Affect: Mood normal.         Behavior: Behavior normal.         Thought Content: Thought content " normal.         Judgment: Judgment normal.               Assessment & Plan:     Problem List Items Addressed This Visit     Encounter for completion of form with patient     -DMV form completed and provided back to patient                  21 minutes for chart review, patient interaction, completing form, and documentation of encounter    No follow-ups on file.    Please note that this dictation was created using voice recognition software. I have made every reasonable attempt to correct obvious errors, but I expect that there are errors of grammar and possibly content that I did not discover before finalizing the note.

## 2022-10-24 ENCOUNTER — HOSPITAL ENCOUNTER (EMERGENCY)
Facility: MEDICAL CENTER | Age: 87
End: 2022-10-24
Attending: EMERGENCY MEDICINE
Payer: MEDICARE

## 2022-10-24 VITALS
SYSTOLIC BLOOD PRESSURE: 162 MMHG | WEIGHT: 102.9 LBS | TEMPERATURE: 97.6 F | HEIGHT: 62 IN | BODY MASS INDEX: 18.93 KG/M2 | DIASTOLIC BLOOD PRESSURE: 96 MMHG | RESPIRATION RATE: 16 BRPM | OXYGEN SATURATION: 93 % | HEART RATE: 91 BPM

## 2022-10-24 DIAGNOSIS — S00.83XA CONTUSION OF FACE, INITIAL ENCOUNTER: ICD-10-CM

## 2022-10-24 PROCEDURE — 99284 EMERGENCY DEPT VISIT MOD MDM: CPT

## 2022-10-24 ASSESSMENT — FIBROSIS 4 INDEX: FIB4 SCORE: 5.43

## 2022-10-25 NOTE — ED NOTES
Patient discharged home in stable condition with daughter  AVS provided with recommended follow up and home care instructions and education information  No prescriptions provided at this time  Patient and daughter verbalized understanding  Ambulatory at time of discharge

## 2022-10-25 NOTE — ED PROVIDER NOTES
ED Provider Note    Scribed for Chavo Pompa M.D. by Meet Meonn. 10/24/2022  8:18 PM    Primary care provider: Herman Neal D.O.  Means of arrival: walk in  History obtained from: Patient  History limited by: None    CHIEF COMPLAINT  Chief Complaint   Patient presents with    Black Eye     No injury to eye, after shower today saw she had a black eye.  No visual changes.  Optometrist recommended she come to ED.         HPI  Denae Rivas is a 87 y.o. female who presents to the Emergency Department for a black eye. She states she does not know how she got the injury. She got out of the shower and went downstairs to wrap her head in a towel, when she returned to the bathroom she saw a black eye. She contacted her eye doctor who advised her to come to the ED as her symptoms can be characteristic of a heart attack or stroke. She denies any trauma to the region. She notes an occasional history of hypertension but does not take any medications for it.    REVIEW OF SYSTEMS  Pertinent positives include black eye. Pertinent negatives include no fever.      PAST MEDICAL HISTORY   has a past medical history of Anesthesia, CATARACT, and Shingles (2009).    SURGICAL HISTORY   has a past surgical history that includes mammoplasty augmentation (1976/85/94); cataract extraction with iol (2002); breast implant revision (9/27/2010); mastopexy (9/27/2010); other (1985); abdominoplasty (1/24/2011); breast implant revision (1/24/2011); liposuction (1/24/2011); capsulectomy (1/24/2011); and breast augmentation with implant (76,84,94,10).    SOCIAL HISTORY  Social History     Tobacco Use    Smoking status: Never    Smokeless tobacco: Never   Substance Use Topics    Alcohol use: No     Comment: quit 30 years     Drug use: No      Social History     Substance and Sexual Activity   Drug Use No       FAMILY HISTORY  Family History   Problem Relation Age of Onset    Diabetes Other     Heart Disease Other     Hypertension Other   "   Heart Disease Father        CURRENT MEDICATIONS  Home Medications       Reviewed by Basia Roberto R.N. (Registered Nurse) on 10/24/22 at 1808  Med List Status: Not Addressed     Medication Last Dose Status   acyclovir (ZOVIRAX) 400 MG tablet  Active   Cholecalciferol (VITAMIN D3) 125 MCG (5000 UT) Cap  Active   Iodine, Kelp, (KELP PO)  Active   Nutritional Supplements (NUTRITIONAL SUPPLEMENT PO)  Active   Nutritional Supplements (NUTRITIONAL SUPPLEMENT PO)  Active   Nutritional Supplements (NUTRITIONAL SUPPLEMENT PO)  Active                    ALLERGIES  Allergies   Allergen Reactions    Tetracycline      \"felt intoxicated\"       PHYSICAL EXAM  VITAL SIGNS: BP (!) 203/100   Pulse 91   Temp 36.7 °C (98 °F) (Temporal)   Resp 16   Ht 1.575 m (5' 2\")   Wt 46.7 kg (102 lb 14.4 oz)   SpO2 94%   BMI 18.82 kg/m²     Constitutional: Well developed, Well nourished, No distress, Non-toxic appearance.   HENT: Normocephalic, Atraumatic.  Oropharynx moist.   Eyes: Small oval ecchymosis to the left cheek approximately 2 cm in length, no tenderness to palpation. PERRL, EOMI, Conjunctiva normal, No discharge.   CV: Good pulses  Thorax & Lungs: No respiratory distress.   Skin: Warm, Dry, No erythema, No rash.    Musculoskeletal: No major deformities noted.   Neurologic: Awake, alert. Moves all extremities spontaneously.  Psychiatric: Affect normal, Mood normal.    COURSE & MEDICAL DECISION MAKING  Nursing notes, VS, PMSFHx reviewed in chart.    8:18 PM - Patient seen and examined at bedside. Informed patient that there is nothing to worry about and that this is likely due to some trauma. Informed patient that she will be discharged at this time. Discussed return precautions and plan for at home care. Patient verbalizes understanding and agreement to this plan of care.     Decision Making:  Patient with left facial ecchymosis, likely remote trauma, the patient is hypertensive here I believe this is likely whitecoat " syndrome.  Reviewed the patient's medical records and the patient had a normal blood pressure within the last few months.  Do not see any evidence of hypertensive emergency, no evidence of stroke or chest pain/ACS.  Will discharge patient home, have the patient return with worsening symptoms.    The patient will return for new or worsening symptoms and is stable at the time of discharge.    The patient is referred to a primary physician for blood pressure management, diabetic screening, and for all other preventative health concerns.    DISPOSITION:  Patient will be discharged home in stable condition.    FOLLOW UP:  [facility], Emergency Dept  [address]  [place] [address]  [phone]    If symptoms worsen    FINAL IMPRESSION  1. Contusion of face, initial encounter       IMeet (Scribe), am scribing for, and in the presence of, Chavo Pompa M.D..    Electronically signed by: Meet Menon (Courtibnaveen), 10/24/2022    IChavo M.D. personally performed the services described in this documentation, as scribed by Meet Menon in my presence, and it is both accurate and complete.    The note accurately reflects work and decisions made by me.  Chavo Pompa M.D.  10/24/2022  10:52 PM

## 2022-10-25 NOTE — ED TRIAGE NOTES
"Chief Complaint   Patient presents with    Black Eye     No injury to eye, after shower today saw she had a black eye.  No visual changes.  Optometrist recommended she come to ED.       BP (!) 203/100   Pulse 91   Temp 36.7 °C (98 °F) (Temporal)   Resp 16   Ht 1.575 m (5' 2\")   Wt 46.7 kg (102 lb 14.4 oz)   SpO2 94%   BMI 18.82 kg/m²     "

## 2022-11-17 ENCOUNTER — DOCUMENTATION (OUTPATIENT)
Dept: HEALTH INFORMATION MANAGEMENT | Facility: OTHER | Age: 87
End: 2022-11-17
Payer: MEDICARE

## 2023-01-04 RX ORDER — ACYCLOVIR 400 MG/1
1 TABLET ORAL PRN
Qty: 15 | Refills: 6 | Status: ERX

## 2023-04-14 PROBLEM — R94.30 NONSPECIFIC ABNORMAL FUNCTION STUDY, CARDIOVASCULAR: Status: ACTIVE | Noted: 2023-04-14

## 2023-04-14 PROBLEM — B00.9 HSV INFECTION: Status: ACTIVE | Noted: 2021-11-17

## 2023-05-19 ENCOUNTER — APPOINTMENT (OUTPATIENT)
Dept: RADIOLOGY | Facility: MEDICAL CENTER | Age: 88
End: 2023-05-19
Attending: EMERGENCY MEDICINE
Payer: MEDICARE

## 2023-05-19 ENCOUNTER — HOSPITAL ENCOUNTER (EMERGENCY)
Facility: MEDICAL CENTER | Age: 88
End: 2023-05-19
Attending: EMERGENCY MEDICINE
Payer: MEDICARE

## 2023-05-19 VITALS
TEMPERATURE: 97.5 F | OXYGEN SATURATION: 94 % | RESPIRATION RATE: 18 BRPM | WEIGHT: 104.94 LBS | DIASTOLIC BLOOD PRESSURE: 72 MMHG | HEIGHT: 60 IN | HEART RATE: 69 BPM | BODY MASS INDEX: 20.6 KG/M2 | SYSTOLIC BLOOD PRESSURE: 164 MMHG

## 2023-05-19 DIAGNOSIS — S80.01XA CONTUSION OF RIGHT KNEE, INITIAL ENCOUNTER: ICD-10-CM

## 2023-05-19 DIAGNOSIS — M25.461 EFFUSION OF RIGHT KNEE: ICD-10-CM
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
## 2023-05-19 DIAGNOSIS — S51.011A SKIN TEAR OF RIGHT ELBOW WITHOUT COMPLICATION, INITIAL ENCOUNTER: ICD-10-CM

## 2023-05-19 PROCEDURE — 99284 EMERGENCY DEPT VISIT MOD MDM: CPT

## 2023-05-19 PROCEDURE — 73562 X-RAY EXAM OF KNEE 3: CPT | Mod: RT

## 2023-05-19 ASSESSMENT — PAIN DESCRIPTION - PAIN TYPE: TYPE: ACUTE PAIN

## 2023-05-19 ASSESSMENT — FIBROSIS 4 INDEX: FIB4 SCORE: 5.43

## 2023-05-19 NOTE — ED TRIAGE NOTES
"Chief Complaint   Patient presents with    T-5000 GLF     Pt report pain on her right knee and right elbow due to fall. Pt was walking at the sidewalk street when she suddenly tripped and fell hitting her right elbow and right knee into concrete ground. Denies LOC, vomiting, weakness and dizziness. Not in thinners.      BP (!) 164/90   Pulse 70   Temp 36 °C (96.8 °F) (Temporal)   Resp 18   Ht 1.53 m (5' 0.24\")   Wt 47.6 kg (104 lb 15 oz)   SpO2 96%   BMI 20.33 kg/m²     "

## 2023-05-19 NOTE — DISCHARGE INSTRUCTIONS
Ice the affected areas for the next 24 hours then you can apply moist heat as needed.  Tylenol or ibuprofen for pain  Follow-up with [facility] orthopedics on Monday if you are not remarkably improved.  Your x-ray showed a small amount of fluid in the joint along with arthritic changes therefore if you do not improve you may need to have an MRI done which will need to be done on an outpatient basis as we do not have MRI capabilities in the emergency department after 7 PM at night.  Please return if there are any changes or worsening in your condition and make sure you use the walker at all times to avoid falling.

## 2023-05-19 NOTE — ED NOTES
Vital signs taken and recorded. Discharge in stable condition ambulatory via waler accompanied by daughter. Health teachings given to patient and family with full understanding of the information given. No personal belongings left.

## 2023-05-19 NOTE — ED NOTES
"Knee mobilizer applied 18 inch and walker provided adjusted to the appropriate height 5'1\" of pt.   "

## 2023-05-19 NOTE — ED PROVIDER NOTES
ED Provider Note    CHIEF COMPLAINT  Chief Complaint   Patient presents with    T-5000 GLF     Pt report pain on her right knee and right elbow due to fall. Pt was walking at the sidewalk street when she suddenly tripped and fell hitting her right elbow and right knee into concrete ground. Denies LOC, vomiting, weakness and dizziness. Not in thinners.        EXTERNAL RECORDS REVIEWED  Care everywhere no pertinent medical records    HPI/ROS  LIMITATION TO HISTORY   Select: : None  OUTSIDE HISTORIAN(S):  Family patient's daughter gave history of tonight's incident.    Denae Rivas is a 87 y.o. female who presents tonight with her daughter with a chief complaint of right elbow abrasion and right knee pain and swelling.  Patient and her daughter were walking this evening up off of [address] when she tripped over some concrete on the sidewalk and fell landing on her right knee and elbow.  She did not strike her head and had no loss of consciousness.  She complains of some mild discomfort on the lateral aspect of her right neck but no midline tenderness or deformities.  She denies any complaints of chest pain, shortness of breath or abdominal pain.  She has no distal paresthesias.  She has difficulty walking secondary to knee pain.  He does have a history of osteoporosis.    PAST MEDICAL HISTORY   has a past medical history of Anesthesia, CATARACT, and Shingles (2009).    SURGICAL HISTORY   has a past surgical history that includes mammoplasty augmentation (1976/85/94); cataract extraction with iol (2002); breast implant revision (9/27/2010); mastopexy (9/27/2010); other (1985); abdominoplasty (1/24/2011); breast implant revision (1/24/2011); liposuction (1/24/2011); capsulectomy (1/24/2011); and breast augmentation with implant (76,84,94,10).    FAMILY HISTORY  Family History   Problem Relation Age of Onset    Diabetes Other     Heart Disease Other     Hypertension Other     Heart Disease Father   "      SOCIAL HISTORY  Social History     Tobacco Use    Smoking status: Never    Smokeless tobacco: Never   Substance and Sexual Activity    Alcohol use: No     Comment: quit 30 years     Drug use: No    Sexual activity: Not on file       CURRENT MEDICATIONS  Home Medications       Reviewed by Rosie Veloz R.N. (Registered Nurse) on 05/19/23 at 0027  Med List Status: Partial     Medication Last Dose Status   acyclovir (ZOVIRAX) 400 MG tablet  Active   Cholecalciferol (VITAMIN D3) 125 MCG (5000 UT) Cap  Active   Iodine, Kelp, (KELP PO)  Active   Nutritional Supplements (NUTRITIONAL SUPPLEMENT PO)  Active   Nutritional Supplements (NUTRITIONAL SUPPLEMENT PO)  Active   Nutritional Supplements (NUTRITIONAL SUPPLEMENT PO)  Active                    ALLERGIES  Allergies   Allergen Reactions    Tetracycline      \"felt intoxicated\"       PHYSICAL EXAM  VITAL SIGNS: BP (!) 164/90   Pulse 70   Temp 36 °C (96.8 °F) (Temporal)   Resp 18   Ht 1.53 m (5' 0.24\")   Wt 47.6 kg (104 lb 15 oz)   SpO2 96%   BMI 20.33 kg/m²    Constitutional: Patient is a very thin pleasant elderly female in mild distress.  HENT: Normocephalic, atraumatic. Nose normal , Oropharynx moist with no oral or dental trauma.  Eyes: PERRL, EOMI, Conjunctiva without erythema  Neck: Supple with Normal range of motion in flexion, extension and lateral rotation. No tenderness along the bony prominences or paraspinal muscles.   Lymphatic: No lymphadenopathy noted.   Cardiovascular: Normal heart rate and Regular rhythm. No murmur  Thorax & Lungs: Clear and equal breath sounds with good excursion. No respiratory distress, no rhonchi, wheezing or rales. No chest tenderness, or signs of trauma.  Abdomen: Bowel sounds normal in all four quadrants. Soft,nontender, no trauma.  Skin: Warm, Dry  Back: No cervical, thoracic, or lumbosacral tenderness.    Extremities: Peripheral pulses 4/4 , right elbow with a 2 cm skin tear on the distal elbow extending " through the epidermis.  There is no bony tenderness.  She has full flexion, extension, pronation and supination, good distal sensation with equal  strength bilaterally.  Right knee reveals a 3 cm abrasion over the patella with soft tissue swelling but no joint effusion.  She has no bony tenderness.  There is normal flexion and extension.  She has good pedal and posterior tibial pulses, good capillary refill and good distal sensation of her toes.  There is no other signs of trauma noted to the extremities.  Musculoskeletal: Normal range of motion in all major joints. No tenderness to palpation or major deformities noted.   Neurologic: Alert & oriented x 3, Normal motor function, Normal sensory function, No lateralizing or focal deficits noted. DTR's 4/4 bilaterally.  Psychiatric: Affect normal, Judgment normal, Mood normal.      DIAGNOSTIC STUDIES / PROCEDURES    RADIOLOGY  I have independently interpreted the diagnostic imaging associated with this visit and am waiting the final reading from the radiologist.   My preliminary interpretation is as follows: No fracture or subluxation  Radiologist interpretation:   DX-KNEE 3 VIEWS RIGHT   Final Result         1.  No acute traumatic bony injury.   2.  The central portion of the patellar tendon appears thinned, consider partial laceration, correlate with exam. Could be further evaluated with MRI of the knee as clinically appropriate.   3.  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  Trace knee joint effusion           COURSE & MEDICAL DECISION MAKING    ED Observation Status? No; Patient does not meet criteria for ED Observation.     INITIAL ASSESSMENT, COURSE AND PLAN  Care Narrative: Patient was evaluated and x-rays ordered of her right knee, her wounds were cleansed and dressed on her right elbow and right knee.  I offered her pain medication but at this time she states she did not want anything.  Her x-rays revealed no fracture or  subluxation, there is degenerative joint disease and a small joint effusion.  Patient was given a knee immobilizer along with a walker to avoid future falls while her knee is injured.  They will be referred to [facility] orthopedics as needed or their primary care doctor for follow-up.  She is to ice for 24 hours then moist heat, wear the knee immobilizer until the pain is gone and return if any problems or worsening she is discharged in stable and improved condition and currently was offered an additional opportunity for pain medication and declines at this time.        ADDITIONAL PROBLEM LIST  Osteoporosis  DISPOSITION AND DISCUSSIONS  I have discussed management of the patient with the following physicians and SANJAY's: None    Discussion of management with other Q or appropriate source(s): None     Barriers to care at this time, including but not limited to:  None .     Decision tools and prescription drugs considered including, but not limited to:  Knee immobilizer, walker, Ortho follow-up .    FINAL DIAGNOSIS  1. Fall from ground level    2. Skin tear of right elbow without complication, initial encounter    3. Contusion of right knee, initial encounter           Electronically signed by: Nilsa Solis D.O., 5/19/2023 12:53 AM

## 2023-07-13 ENCOUNTER — TELEPHONE (OUTPATIENT)
Dept: MEDICAL GROUP | Facility: LAB | Age: 88
End: 2023-07-13
Payer: MEDICARE

## 2023-07-13 NOTE — TELEPHONE ENCOUNTER
ANNUAL WELLNESS VISIT PRE-VISIT PLANNING    1.  Reviewed notes from the last office visit: Yes    2.  If any orders were ordered or intended to be done prior to visit (i.e. 6 mos follow-up), do we have results/consult notes or has patient scheduled?          Labs - Labs were not ordered at last office visit.  Note: If patient appointment is for lab review and patient did not complete labs, check with provider if OK to reschedule patient until labs completed.         Imaging - Imaging ordered, completed and results are in chart.         Referrals - No referrals were ordered at last office visit.    3.  Immunizations were updated in Epic using Reconcile Outside Information activity? Yes    4.  Patient is due for the following Health Maintenance Topics:   Health Maintenance Due   Topic Date Due    COVID-19 Vaccine (1) Never done    IMM ZOSTER VACCINES (1 of 2) Never done    IMM DTaP/Tdap/Td Vaccine (1 - Tdap) 03/30/2001    IMM PNEUMOCOCCAL VACCINE: 65+ Years (1 - PCV) Never done   5.  Reviewed/Updated the following with patient:          Preferred Pharmacy? No          Preferred Lab? No          Preferred Communication? No          Allergies? No          Medications? NO  6.  Care Team Updated:          DME Company (gait device, O2, CPAP, etc.): N\A          Other Specialists (eye doctor, derm, GYN, cardiology, endo, etc): NO    7.  Patient was not advised: “This is a free wellness visit. The provider will screen for medical conditions to help you stay healthy. If you have other concerns to address you may be asked to discuss these at a separate visit or there may be an additional fee.”     8.  AHA (Puls8) form printed for Provider? N/A

## 2023-07-13 NOTE — TELEPHONE ENCOUNTER
Left message for patient to call back regarding pre-visit planning. Please transfer call to [phone].

## 2023-07-21 ENCOUNTER — OFFICE VISIT (OUTPATIENT)
Dept: MEDICAL GROUP | Facility: LAB | Age: 88
End: 2023-07-21
Payer: MEDICARE

## 2023-07-21 VITALS
OXYGEN SATURATION: 94 % | HEIGHT: 60 IN | HEART RATE: 86 BPM | BODY MASS INDEX: 20.22 KG/M2 | SYSTOLIC BLOOD PRESSURE: 140 MMHG | RESPIRATION RATE: 16 BRPM | DIASTOLIC BLOOD PRESSURE: 80 MMHG | TEMPERATURE: 96.9 F | WEIGHT: 103 LBS

## 2023-07-21 DIAGNOSIS — R41.3 MEMORY DEFICIT: ICD-10-CM

## 2023-07-21 DIAGNOSIS — H53.9 VISUAL CHANGES: ICD-10-CM

## 2023-07-21 PROCEDURE — 99214 OFFICE O/P EST MOD 30 MIN: CPT | Performed by: STUDENT IN AN ORGANIZED HEALTH CARE EDUCATION/TRAINING PROGRAM

## 2023-07-21 PROCEDURE — 3077F SYST BP >= 140 MM HG: CPT | Performed by: STUDENT IN AN ORGANIZED HEALTH CARE EDUCATION/TRAINING PROGRAM

## 2023-07-21 PROCEDURE — 3079F DIAST BP 80-89 MM HG: CPT | Performed by: STUDENT IN AN ORGANIZED HEALTH CARE EDUCATION/TRAINING PROGRAM

## 2023-07-21 ASSESSMENT — ENCOUNTER SYMPTOMS
FEVER: 0
CHILLS: 0
SHORTNESS OF BREATH: 0

## 2023-07-21 ASSESSMENT — FIBROSIS 4 INDEX: FIB4 SCORE: 5.43

## 2023-07-21 NOTE — ASSESSMENT & PLAN NOTE
Chronic-worsening  -will order brain mri- to rule out stroke and other structural etiologies  - Will complete Mini-Mental exam to further evaluate  -Did discuss with patient that exercising is important to help improve her cognition

## 2023-07-21 NOTE — PROGRESS NOTES
"Subjective:     CC: follow up     HPI:   Denae presents today for the following;    Problem   Memory Deficit    -Patient in the past 6 months has had a change in her memories  --patient at 76 had plastic surgery that had complications from the surgery and had to stay in icu which her daughter reports has changed her cognition   -she also had covid in the last couple years that has changed her mental capabilities   -Her family members and) have also noticed a difference in her mental capabilities as well over the last 6 months  -Patient is constantly forgetting names, dates, losing directions on how to get to places where she has been before  - She does not have get lost getting home, she does remember to pay her bills, she does prepare food for herself, and date  At her geriatric visit in April she was not able to complete all of her medical cognitive tests appropriately     Visual Changes    Patient has been seeing double and following with her eye doctor.          Current Outpatient Medications Ordered in Epic   Medication Sig Dispense Refill    acyclovir (ZOVIRAX) 400 MG tablet Take 400 mg by mouth 3 times a day as needed.      Nutritional Supplements (NUTRITIONAL SUPPLEMENT PO) Take  by mouth. Shark Liver Oil - 2 capsules two times daily      Nutritional Supplements (NUTRITIONAL SUPPLEMENT PO) Take 2 Capsules by mouth in the morning, at noon, and at bedtime. \"Bone Up\" (calcium, vitamin D, vitamin K, magnesium)      Cholecalciferol (VITAMIN D3) 125 MCG (5000 UT) Cap Take 1 Capsule by mouth every day.      Iodine, Kelp, (KELP PO) Take 1 Capsule by mouth 1 time a day as needed. (Patient not taking: Reported on 4/14/2023)      Nutritional Supplements (NUTRITIONAL SUPPLEMENT PO) Take 1 Capsule by mouth every day. [place] Oil (Patient not taking: Reported on 4/14/2023)       No current [facility]-ordered facility-administered medications on file.           ROS:  Review of Systems   Constitutional:  Negative for chills and " "fever.   Respiratory:  Negative for shortness of breath.    Cardiovascular:  Negative for chest pain.       Objective:     Exam:  BP (!) 140/80 (BP Location: Right arm, Patient Position: Sitting, BP Cuff Size: Adult)   Pulse 86   Temp 36.1 °C (96.9 °F) (Temporal)   Resp 16   Ht 1.53 m (5' 0.24\")   Wt 46.7 kg (103 lb)   SpO2 94%   BMI 19.96 kg/m²  Body mass index is 19.96 kg/m².    Physical Exam  Constitutional:       General: She is not in acute distress.     Appearance: She is not ill-appearing.   Pulmonary:      Effort: Pulmonary effort is normal.   Neurological:      Mental Status: She is alert.   Psychiatric:         Mood and Affect: Mood normal.         Behavior: Behavior normal.         Thought Content: Thought content normal.         Judgment: Judgment normal.               Assessment & Plan:     Problem List Items Addressed This Visit       Memory deficit     Chronic  -will order brain mri- to rule out stroke and other structural etiologies  - Will complete Mini-Mental exam to further evaluate  -Did discuss with patient that exercising is important to help improve her cognition           Relevant Orders    MR-BRAIN-W/O    Visual changes    Relevant Orders    MR-BRAIN-W/O                 Please note that this dictation was created using voice recognition software. I have made every reasonable attempt to correct obvious errors, but I expect that there are errors of grammar and possibly content that I did not discover before finalizing the note.        "

## 2023-07-24 ENCOUNTER — APPOINTMENT (RX ONLY)
Dept: URBAN - METROPOLITAN AREA CLINIC 4 | Facility: CLINIC | Age: 88
Setting detail: DERMATOLOGY
End: 2023-07-24

## 2023-07-24 DIAGNOSIS — D18.0 HEMANGIOMA: ICD-10-CM

## 2023-07-24 DIAGNOSIS — Z85.828 PERSONAL HISTORY OF OTHER MALIGNANT NEOPLASM OF SKIN: ICD-10-CM

## 2023-07-24 DIAGNOSIS — L57.0 ACTINIC KERATOSIS: ICD-10-CM

## 2023-07-24 DIAGNOSIS — L82.0 INFLAMED SEBORRHEIC KERATOSIS: ICD-10-CM

## 2023-07-24 DIAGNOSIS — I83.9 ASYMPTOMATIC VARICOSE VEINS OF LOWER EXTREMITIES: ICD-10-CM

## 2023-07-24 DIAGNOSIS — L82.1 OTHER SEBORRHEIC KERATOSIS: ICD-10-CM

## 2023-07-24 DIAGNOSIS — L81.4 OTHER MELANIN HYPERPIGMENTATION: ICD-10-CM

## 2023-07-24 PROBLEM — I83.93 ASYMPTOMATIC VARICOSE VEINS OF BILATERAL LOWER EXTREMITIES: Status: ACTIVE | Noted: 2023-07-24

## 2023-07-24 PROBLEM — D18.01 HEMANGIOMA OF SKIN AND SUBCUTANEOUS TISSUE: Status: ACTIVE | Noted: 2023-07-24

## 2023-07-24 PROCEDURE — ? OBSERVATION

## 2023-07-24 PROCEDURE — ? COUNSELING

## 2023-07-24 PROCEDURE — ? LIQUID NITROGEN (COSMETIC)

## 2023-07-24 PROCEDURE — 17000 DESTRUCT PREMALG LESION: CPT

## 2023-07-24 PROCEDURE — 99213 OFFICE O/P EST LOW 20 MIN: CPT | Mod: 25

## 2023-07-24 PROCEDURE — ? LIQUID NITROGEN

## 2023-07-24 ASSESSMENT — LOCATION SIMPLE DESCRIPTION DERM
LOCATION SIMPLE: RIGHT CLAVICULAR SKIN
LOCATION SIMPLE: RIGHT ANKLE
LOCATION SIMPLE: RIGHT CALF
LOCATION SIMPLE: CHEST
LOCATION SIMPLE: UPPER BACK
LOCATION SIMPLE: LOWER BACK
LOCATION SIMPLE: LEFT CLAVICULAR SKIN
LOCATION SIMPLE: LEFT PRETIBIAL REGION
LOCATION SIMPLE: LEFT CALF
LOCATION SIMPLE: NECK
LOCATION SIMPLE: RIGHT UPPER BACK

## 2023-07-24 ASSESSMENT — LOCATION DETAILED DESCRIPTION DERM
LOCATION DETAILED: SUPERIOR LUMBAR SPINE
LOCATION DETAILED: MIDDLE STERNUM
LOCATION DETAILED: RIGHT CLAVICULAR SKIN
LOCATION DETAILED: LEFT DISTAL CALF
LOCATION DETAILED: RIGHT INFERIOR MEDIAL UPPER BACK
LOCATION DETAILED: SUPERIOR THORACIC SPINE
LOCATION DETAILED: STERNAL NOTCH
LOCATION DETAILED: LEFT DISTAL PRETIBIAL REGION
LOCATION DETAILED: LEFT CENTRAL LATERAL NECK
LOCATION DETAILED: RIGHT ANTERIOR MEDIAL MALLEOLUS
LOCATION DETAILED: LEFT CLAVICULAR SKIN
LOCATION DETAILED: UPPER STERNUM
LOCATION DETAILED: RIGHT DISTAL CALF

## 2023-07-24 ASSESSMENT — LOCATION ZONE DERM
LOCATION ZONE: TRUNK
LOCATION ZONE: NECK
LOCATION ZONE: LEG

## 2023-07-24 NOTE — PROCEDURE: LIQUID NITROGEN (COSMETIC)
Billing Information: Bill by Static Price
Post-Care Instructions: I reviewed with the patient in detail post-care instructions. Patient is to wear sunprotection, and avoid picking at any of the treated lesions. Pt may apply Vaseline to crusted or scabbing areas.
Spray Paint Technique: No
Price (Use Numbers Only, No Special Characters Or $): 0
Consent: The patient's consent was obtained including but not limited to risks of crusting, scabbing, blistering, scarring, darker or lighter pigmentary change, recurrence, incomplete removal and infection. The patient understands that the procedure is cosmetic in nature and is not covered by insurance.
Show Spray Paint Technique Variable?: Yes
Spray Paint Text: The liquid nitrogen was applied to the skin utilizing a spray paint frosting technique.
Detail Level: Detailed

## 2023-07-24 NOTE — PROCEDURE: MIPS QUALITY
----- Message from Rosaura Morrell NP sent at 7/2/2019  2:44 PM CDT -----  Pap normal with negative HPV  
Quality 226: Preventive Care And Screening: Tobacco Use: Screening And Cessation Intervention: Patient screened for tobacco use and is an ex/non-smoker
Quality 130: Documentation Of Current Medications In The Medical Record: Current Medications Documented
Detail Level: Detailed

## 2023-07-28 ENCOUNTER — APPOINTMENT (OUTPATIENT)
Dept: RADIOLOGY | Facility: MEDICAL CENTER | Age: 88
End: 2023-07-28
Attending: STUDENT IN AN ORGANIZED HEALTH CARE EDUCATION/TRAINING PROGRAM
Payer: MEDICARE

## 2023-07-28 DIAGNOSIS — H53.9 VISUAL CHANGES: ICD-10-CM

## 2023-07-28 DIAGNOSIS — R41.3 MEMORY DEFICIT: ICD-10-CM

## 2023-07-28 PROCEDURE — 70551 MRI BRAIN STEM W/O DYE: CPT

## 2023-08-01 ENCOUNTER — TELEPHONE (OUTPATIENT)
Dept: MEDICAL GROUP | Facility: LAB | Age: 88
End: 2023-08-01
Payer: MEDICARE

## 2023-08-01 NOTE — TELEPHONE ENCOUNTER
ESTABLISHED PATIENT PRE-VISIT PLANNING     Patient was NOT contacted to complete PVP.     Note: Patient will not be contacted if there is no indication to call.     1.  Reviewed notes from the last few office visits within the medical group: Yes    2.  If any orders were placed at last visit or intended to be done for this visit (i.e. 6 mos follow-up), do we have Results/Consult Notes?           Labs - Labs were not ordered at last office visit.  Note: If patient appointment is for lab review and patient did not complete labs, check with provider if OK to reschedule patient until labs completed.         Imaging - Imaging ordered, completed and results are in chart.         Referrals - No referrals were ordered at last office visit.    3. Is this appointment scheduled as a Hospital Follow-Up? No    4.  Immunizations were updated in Epic using Reconcile Outside Information activity? Yes    5.  Patient is due for the following Health Maintenance Topics:   Health Maintenance Due   Topic Date Due    COVID-19 Vaccine (1) Never done    IMM ZOSTER VACCINES (1 of 2) Never done    IMM DTaP/Tdap/Td Vaccine (1 - Tdap) 03/30/2001    IMM PNEUMOCOCCAL VACCINE: 65+ Years (1 - PCV) Never done     6.  AHA (Pulse8) form printed for Provider? N/A

## 2023-08-10 ENCOUNTER — OFFICE VISIT (OUTPATIENT)
Dept: MEDICAL GROUP | Facility: LAB | Age: 88
End: 2023-08-10
Payer: MEDICARE

## 2023-08-10 VITALS
TEMPERATURE: 97.3 F | OXYGEN SATURATION: 94 % | BODY MASS INDEX: 19.63 KG/M2 | DIASTOLIC BLOOD PRESSURE: 72 MMHG | WEIGHT: 100 LBS | HEART RATE: 84 BPM | HEIGHT: 60 IN | SYSTOLIC BLOOD PRESSURE: 136 MMHG | RESPIRATION RATE: 16 BRPM

## 2023-08-10 DIAGNOSIS — R41.89 COGNITIVE DECLINE: ICD-10-CM

## 2023-08-10 DIAGNOSIS — Z00.00 HEALTH CARE MAINTENANCE: ICD-10-CM

## 2023-08-10 DIAGNOSIS — R41.3 MEMORY DEFICIT: ICD-10-CM

## 2023-08-10 PROCEDURE — 3078F DIAST BP <80 MM HG: CPT | Performed by: STUDENT IN AN ORGANIZED HEALTH CARE EDUCATION/TRAINING PROGRAM

## 2023-08-10 PROCEDURE — 99214 OFFICE O/P EST MOD 30 MIN: CPT | Performed by: STUDENT IN AN ORGANIZED HEALTH CARE EDUCATION/TRAINING PROGRAM

## 2023-08-10 PROCEDURE — 3075F SYST BP GE 130 - 139MM HG: CPT | Performed by: STUDENT IN AN ORGANIZED HEALTH CARE EDUCATION/TRAINING PROGRAM

## 2023-08-10 ASSESSMENT — ENCOUNTER SYMPTOMS
CHILLS: 0
FEVER: 0
SHORTNESS OF BREATH: 0

## 2023-08-10 ASSESSMENT — FIBROSIS 4 INDEX: FIB4 SCORE: 5.43

## 2023-08-10 NOTE — PROGRESS NOTES
"Subjective:     CC: follow up mri     HPI:   Denae presents today for the following;    Problem   Cognitive Decline    Patient has been struggling with her cognition. She is unable to keep track of appointments and dates where she is showing abnormally early to events. Her brain MRI reported volume loss and she scored to 25/30 on her mini mental exam. I         Current Outpatient Medications Ordered in Epic   Medication Sig Dispense Refill    acyclovir (ZOVIRAX) 400 MG tablet Take 400 mg by mouth 3 times a day as needed.      Nutritional Supplements (NUTRITIONAL SUPPLEMENT PO) Take  by mouth. Shark Liver Oil - 2 capsules two times daily      Iodine, Kelp, (KELP PO) Take 1 Capsule by mouth 1 time a day as needed. (Patient not taking: Reported on 4/14/2023)      Nutritional Supplements (NUTRITIONAL SUPPLEMENT PO) Take 2 Capsules by mouth in the morning, at noon, and at bedtime. \"Bone Up\" (calcium, vitamin D, vitamin K, magnesium)      Cholecalciferol (VITAMIN D3) 125 MCG (5000 UT) Cap Take 1 Capsule by mouth every day.      Nutritional Supplements (NUTRITIONAL SUPPLEMENT PO) Take 1 Capsule by mouth every day. [place] Oil (Patient not taking: Reported on 4/14/2023)       No current [facility]-ordered facility-administered medications on file.           ROS:  Review of Systems   Constitutional:  Negative for chills and fever.   Respiratory:  Negative for shortness of breath.    Cardiovascular:  Negative for chest pain.       Objective:     Exam:  /72 (BP Location: Right arm, Patient Position: Sitting, BP Cuff Size: Adult long)   Pulse 84   Temp 36.3 °C (97.3 °F)   Resp 16   Ht 1.53 m (5' 0.24\")   Wt 45.4 kg (100 lb)   SpO2 94%   BMI 19.38 kg/m²  Body mass index is 19.38 kg/m².    Physical Exam  Constitutional:       General: She is not in acute distress.     Appearance: She is not ill-appearing.   Pulmonary:      Effort: Pulmonary effort is normal.   Neurological:      Mental Status: She is alert.   Psychiatric:  "        Mood and Affect: Mood normal.         Behavior: Behavior normal.         Thought Content: Thought content normal.         Judgment: Judgment normal.               Assessment & Plan:     Problem List Items Addressed This Visit       Cognitive decline     Chronic-worsening  -I do think Denae is a highfunctioning individual but she has has displayed lapses in her cognition during her appointments. If she is able to control the conversation it is hard to notice a change. However when she does not have control of the conversation and is being asked questions, she does have a delay in answering and sometimes forgets what we had previously discussed   -check OT driving eval to make sure she is safe drive  -patient enjoys pilates and recommended she continue pilates daily since exercise in general helps to improve cognition          Relevant Orders    OT Driving Eval    Memory deficit    Relevant Orders    OT Driving Eval     Other Visit Diagnoses       Health care maintenance        Relevant Orders    HEMOGLOBIN A1C    CBC WITH DIFFERENTIAL    Comp Metabolic Panel    TSH WITH REFLEX TO FT4    Lipid Profile            I spent a total of 31 minutes with record review, exam, communication with the patient, and documentation of this encounter.        Please note that this dictation was created using voice recognition software. I have made every reasonable attempt to correct obvious errors, but I expect that there are errors of grammar and possibly content that I did not discover before finalizing the note.

## 2023-08-10 NOTE — ASSESSMENT & PLAN NOTE
Chronic-worsening  -I do think Denae is a highfunctioning individual but she has has displayed lapses in her cognition during her appointments. If she is able to control the conversation it is hard to notice a change. However when she does not have control of the conversation and is being asked questions, she does have a delay in answering and sometimes forgets what we had previously discussed   -check OT driving eval to make sure she is safe drive  -patient enjoys pilates and recommended she continue pilates daily since exercise in general helps to improve cognition

## 2023-09-26 ENCOUNTER — RX ONLY (OUTPATIENT)
Age: 88
Setting detail: RX ONLY
End: 2023-09-26

## 2023-09-26 RX ORDER — ACYCLOVIR 400 MG/1
1 TABLET ORAL PRN
Qty: 15 | Refills: 6 | Status: ERX

## 2023-09-26 RX ORDER — ACYCLOVIR 50 MG/G
THIN LAYER OINTMENT TOPICAL QD
Qty: 30 | Refills: 3 | Status: ERX

## 2023-10-13 ENCOUNTER — OCCUPATIONAL THERAPY (OUTPATIENT)
Dept: OCCUPATIONAL THERAPY | Facility: REHABILITATION | Age: 88
End: 2023-10-13
Attending: STUDENT IN AN ORGANIZED HEALTH CARE EDUCATION/TRAINING PROGRAM
Payer: MEDICARE

## 2023-10-13 DIAGNOSIS — M54.6 BACK PAIN OF THORACOLUMBAR REGION: ICD-10-CM

## 2023-10-13 DIAGNOSIS — M25.511 RIGHT SHOULDER PAIN, UNSPECIFIED CHRONICITY: ICD-10-CM

## 2023-10-13 DIAGNOSIS — M41.40 NEUROMUSCULAR SCOLIOSIS, UNSPECIFIED SPINAL REGION: ICD-10-CM

## 2023-10-13 DIAGNOSIS — R41.3 MEMORY DEFICIT: ICD-10-CM

## 2023-10-13 DIAGNOSIS — M54.50 BACK PAIN OF THORACOLUMBAR REGION: ICD-10-CM

## 2023-10-13 DIAGNOSIS — R41.89 COGNITIVE DECLINE: ICD-10-CM

## 2023-10-13 PROCEDURE — 97750 PHYSICAL PERFORMANCE TEST: CPT

## 2023-10-13 ASSESSMENT — BALANCE ASSESSMENTS
BALANCE - STANDING STATIC: NORMAL
BALANCE - SITTING DYNAMIC: NORMAL
BALANCE - STANDING DYNAMIC: GOOD
BALANCE - SITTING STATIC: NORMAL
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
## 2024-01-30 ENCOUNTER — HOSPITAL ENCOUNTER (EMERGENCY)
Facility: MEDICAL CENTER | Age: 89
End: 2024-01-30
Attending: EMERGENCY MEDICINE
Payer: MEDICARE

## 2024-01-30 ENCOUNTER — APPOINTMENT (OUTPATIENT)
Dept: RADIOLOGY | Facility: MEDICAL CENTER | Age: 89
End: 2024-01-30
Attending: EMERGENCY MEDICINE
Payer: MEDICARE

## 2024-01-30 VITALS
BODY MASS INDEX: 20 KG/M2 | HEART RATE: 95 BPM | OXYGEN SATURATION: 94 % | SYSTOLIC BLOOD PRESSURE: 141 MMHG | RESPIRATION RATE: 14 BRPM | DIASTOLIC BLOOD PRESSURE: 80 MMHG | WEIGHT: 101.85 LBS | HEIGHT: 60 IN | TEMPERATURE: 98.2 F

## 2024-01-30 DIAGNOSIS — R41.0 CONFUSION: ICD-10-CM

## 2024-01-30 DIAGNOSIS — U07.1 COVID-19 VIRUS INFECTION: ICD-10-CM

## 2024-01-30 LAB
ALBUMIN SERPL BCP-MCNC: 5 G/DL (ref 3.2–4.9)
ALBUMIN/GLOB SERPL: 1.6 G/DL
ALP SERPL-CCNC: 80 U/L (ref 30–99)
ALT SERPL-CCNC: 11 U/L (ref 2–50)
ANION GAP SERPL CALC-SCNC: 14 MMOL/L (ref 7–16)
APPEARANCE UR: CLEAR
AST SERPL-CCNC: 23 U/L (ref 12–45)
BACTERIA #/AREA URNS HPF: ABNORMAL /HPF
BASOPHILS # BLD AUTO: 0.5 % (ref 0–1.8)
BASOPHILS # BLD: 0.04 K/UL (ref 0–0.12)
BILIRUB SERPL-MCNC: 0.8 MG/DL (ref 0.1–1.5)
BILIRUB UR QL STRIP.AUTO: ABNORMAL
BUN SERPL-MCNC: 18 MG/DL (ref 8–22)
CALCIUM ALBUM COR SERPL-MCNC: 9.1 MG/DL (ref 8.5–10.5)
CALCIUM SERPL-MCNC: 9.9 MG/DL (ref 8.4–10.2)
CHLORIDE SERPL-SCNC: 95 MMOL/L (ref 96–112)
CO2 SERPL-SCNC: 29 MMOL/L (ref 20–33)
COLOR UR: YELLOW
CREAT SERPL-MCNC: 0.96 MG/DL (ref 0.5–1.4)
EOSINOPHIL # BLD AUTO: 0 K/UL (ref 0–0.51)
EOSINOPHIL NFR BLD: 0 % (ref 0–6.9)
EPI CELLS #/AREA URNS HPF: ABNORMAL /HPF
ERYTHROCYTE [DISTWIDTH] IN BLOOD BY AUTOMATED COUNT: 44.3 FL (ref 35.9–50)
FLUAV RNA SPEC QL NAA+PROBE: NEGATIVE
FLUBV RNA SPEC QL NAA+PROBE: NEGATIVE
GFR SERPLBLD CREATININE-BSD FMLA CKD-EPI: 57 ML/MIN/1.73 M 2
GLOBULIN SER CALC-MCNC: 3.1 G/DL (ref 1.9–3.5)
GLUCOSE BLD STRIP.AUTO-MCNC: 168 MG/DL (ref 65–99)
GLUCOSE SERPL-MCNC: 104 MG/DL (ref 65–99)
GLUCOSE UR STRIP.AUTO-MCNC: NEGATIVE MG/DL
HCT VFR BLD AUTO: 48.5 % (ref 37–47)
HGB BLD-MCNC: 15.5 G/DL (ref 12–16)
IMM GRANULOCYTES # BLD AUTO: 0.03 K/UL (ref 0–0.11)
IMM GRANULOCYTES NFR BLD AUTO: 0.4 % (ref 0–0.9)
KETONES UR STRIP.AUTO-MCNC: ABNORMAL MG/DL
LEUKOCYTE ESTERASE UR QL STRIP.AUTO: NEGATIVE
LYMPHOCYTES # BLD AUTO: 0.59 K/UL (ref 1–4.8)
LYMPHOCYTES NFR BLD: 8 % (ref 22–41)
MCH RBC QN AUTO: 28.7 PG (ref 27–33)
MCHC RBC AUTO-ENTMCNC: 32 G/DL (ref 32.2–35.5)
MCV RBC AUTO: 89.8 FL (ref 81.4–97.8)
MICRO URNS: ABNORMAL
MONOCYTES # BLD AUTO: 0.52 K/UL (ref 0–0.85)
MONOCYTES NFR BLD AUTO: 7.1 % (ref 0–13.4)
NEUTROPHILS # BLD AUTO: 6.15 K/UL (ref 1.82–7.42)
NEUTROPHILS NFR BLD: 84 % (ref 44–72)
NITRITE UR QL STRIP.AUTO: NEGATIVE
NRBC # BLD AUTO: 0 K/UL
NRBC BLD-RTO: 0 /100 WBC (ref 0–0.2)
PH UR STRIP.AUTO: 7 [PH] (ref 5–8)
PLATELET # BLD AUTO: 157 K/UL (ref 164–446)
PMV BLD AUTO: 11.1 FL (ref 9–12.9)
POTASSIUM SERPL-SCNC: 4 MMOL/L (ref 3.6–5.5)
PROT SERPL-MCNC: 8.1 G/DL (ref 6–8.2)
PROT UR QL STRIP: 30 MG/DL
RBC # BLD AUTO: 5.4 M/UL (ref 4.2–5.4)
RBC # URNS HPF: ABNORMAL /HPF
RBC UR QL AUTO: NEGATIVE
RSV RNA SPEC QL NAA+PROBE: NEGATIVE
SARS-COV-2 RNA RESP QL NAA+PROBE: DETECTED
SODIUM SERPL-SCNC: 138 MMOL/L (ref 135–145)
SP GR UR STRIP.AUTO: 1.01
SPECIMEN SOURCE: ABNORMAL
TSH SERPL DL<=0.005 MIU/L-ACNC: 2.6 UIU/ML (ref 0.38–5.33)
WBC # BLD AUTO: 7.3 K/UL (ref 4.8–10.8)
WBC #/AREA URNS HPF: ABNORMAL /HPF

## 2024-01-30 PROCEDURE — 36415 COLL VENOUS BLD VENIPUNCTURE: CPT

## 2024-01-30 PROCEDURE — 80053 COMPREHEN METABOLIC PANEL: CPT

## 2024-01-30 PROCEDURE — 70450 CT HEAD/BRAIN W/O DYE: CPT

## 2024-01-30 PROCEDURE — 81001 URINALYSIS AUTO W/SCOPE: CPT

## 2024-01-30 PROCEDURE — 94760 N-INVAS EAR/PLS OXIMETRY 1: CPT

## 2024-01-30 PROCEDURE — 85025 COMPLETE CBC W/AUTO DIFF WBC: CPT

## 2024-01-30 PROCEDURE — 99284 EMERGENCY DEPT VISIT MOD MDM: CPT

## 2024-01-30 PROCEDURE — 84443 ASSAY THYROID STIM HORMONE: CPT

## 2024-01-30 PROCEDURE — 0241U HCHG SARS-COV-2 COVID-19 NFCT DS RESP RNA 4 TRGT MIC: CPT

## 2024-01-30 PROCEDURE — 82962 GLUCOSE BLOOD TEST: CPT

## 2024-01-30 ASSESSMENT — FIBROSIS 4 INDEX: FIB4 SCORE: 5.49

## 2024-01-31 NOTE — DISCHARGE INSTRUCTIONS
Return the emergency department if she has worsening confusion, difficulty breathing, blue lips, chest pain or unilateral weakness.    You likely have a viral illness and may have COVID-19. At this point we do not have your test results.Therefore, COVID-19 is not ruled out and you will have to check my chart in 24 - 36 hours. If you test positive you will need to remain in home quarantine until all three of the following are true:  You are 5 days from symptom onset,   your symptoms are improving   you have been fever free for at least  24 hours without taking any Tylenol or ibuprofen.  4.   you will have to wear a mask when around anyone else for 10 days from the onset of symptoms.  If you develop significant shortness of breath, meaning that it is difficult for you to walk even short distances without having to stop and catch your breath, or you become severely dizzy and this is persistent then please return to the emergency department.    I recommend you get a home pulse oximeter.  Return if your oxygenation is less than 90.
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
## 2024-01-31 NOTE — ED NOTES
Patient is stable for discharge at this time, anticipatory guidance provided, close follow-up is encouraged, and ED return instructions have been detailed. Patient and family are both agreeable to the disposition and plan and discharged home in ambulatory state and in good condition.

## 2024-01-31 NOTE — ED TRIAGE NOTES
Chief Complaint   Patient presents with    Loss of Appetite     Pt had dinner last night but has not eaten anythign today pt FSBS 168.    ALOC     Pt AO x2, pt is oriented to name and event, but does not know name of hospital or the year. Pt stated it is 1941. Pt did have a MRI done that showed she did have some vascular problems from HTN. Daughter states pt just doesn't answer questions like she used to. PT negative on BEFAST scale.        BP (!) 141/80   Pulse 95   Temp 36.8 °C (98.2 °F) (Temporal)   Resp 14   Ht 1.524 m (5')   Wt 46.2 kg (101 lb 13.6 oz)   SpO2 94%   BMI 19.89 kg/m²

## 2024-02-02 ENCOUNTER — TELEPHONE (OUTPATIENT)
Dept: MEDICAL GROUP | Facility: LAB | Age: 89
End: 2024-02-02
Payer: MEDICARE

## 2024-02-02 NOTE — TELEPHONE ENCOUNTER
Patient called and stated she has covid. I attempted to return call for more information. Left message for patient to call back.

## 2024-02-03 NOTE — TELEPHONE ENCOUNTER
Patient called back stating she is 88 with covid and would like medication. Was seen in ER on 1/30/24 and was not given any medication.  [facility] is her pharmacy.

## 2024-04-05 ENCOUNTER — TELEPHONE (OUTPATIENT)
Dept: MEDICAL GROUP | Facility: LAB | Age: 89
End: 2024-04-05
Payer: MEDICARE

## 2024-04-05 DIAGNOSIS — R41.89 COGNITIVE DECLINE: ICD-10-CM

## 2024-04-05 NOTE — TELEPHONE ENCOUNTER
1. Caller Name: Fartun Daughter                        Call Back Number: [phone] (home)         How would the patient prefer to be contacted with a response: phone    Fartun LVM stating her mother can no longer take care of her finances. How does she go forward to assisting her mother.   I did attempted to call Fartun back, left her a voicemail to call back with more information.   Do you want an appt?

## 2024-04-08 ENCOUNTER — PATIENT OUTREACH (OUTPATIENT)
Dept: HEALTH INFORMATION MANAGEMENT | Facility: OTHER | Age: 89
End: 2024-04-08
Payer: MEDICARE

## 2024-04-08 NOTE — PROGRESS NOTES
04/08/2024    KIA PINEDA received a referral from PCP to assist pt and her daughter with resources for POA. Ot is 88 y.o. has issues with driving, managing finances.    @0920 EDWIN called contact number listed in My chart. Left VM with contact information.    Next scheduled outreach on 04/15/2024 if not hear back from the pt earlier.    04/15/2024    @0845 EDWIN sent a message to Fartun, pt's daughter, through My Chart.  Next scheduled outreach   04/22/2024 if not hear back earlier.      04/22/2024    @0845 EDWIN sent a letter to Denae and Fartun. Explained reasons for the referral, informed pt that current referral will be closed if not hear back by 05/06/2024.

## 2024-04-15 ENCOUNTER — PATIENT MESSAGE (OUTPATIENT)
Dept: HEALTH INFORMATION MANAGEMENT | Facility: OTHER | Age: 89
End: 2024-04-15

## 2024-05-09 ENCOUNTER — APPOINTMENT (RX ONLY)
Dept: URBAN - METROPOLITAN AREA CLINIC 4 | Facility: CLINIC | Age: 89
Setting detail: DERMATOLOGY
End: 2024-05-09

## 2024-05-09 DIAGNOSIS — L81.4 OTHER MELANIN HYPERPIGMENTATION: ICD-10-CM

## 2024-05-09 DIAGNOSIS — D18.0 HEMANGIOMA: ICD-10-CM

## 2024-05-09 DIAGNOSIS — L82.1 OTHER SEBORRHEIC KERATOSIS: ICD-10-CM

## 2024-05-09 DIAGNOSIS — Z85.828 PERSONAL HISTORY OF OTHER MALIGNANT NEOPLASM OF SKIN: ICD-10-CM

## 2024-05-09 DIAGNOSIS — L82.0 INFLAMED SEBORRHEIC KERATOSIS: ICD-10-CM

## 2024-05-09 DIAGNOSIS — L57.0 ACTINIC KERATOSIS: ICD-10-CM

## 2024-05-09 PROBLEM — D18.01 HEMANGIOMA OF SKIN AND SUBCUTANEOUS TISSUE: Status: ACTIVE | Noted: 2024-05-09

## 2024-05-09 PROCEDURE — ? COUNSELING

## 2024-05-09 PROCEDURE — 17000 DESTRUCT PREMALG LESION: CPT

## 2024-05-09 PROCEDURE — 99213 OFFICE O/P EST LOW 20 MIN: CPT | Mod: 25

## 2024-05-09 PROCEDURE — ? OBSERVATION

## 2024-05-09 PROCEDURE — ? LIQUID NITROGEN

## 2024-05-09 PROCEDURE — ? LIQUID NITROGEN (COSMETIC)

## 2024-05-09 ASSESSMENT — LOCATION SIMPLE DESCRIPTION DERM
LOCATION SIMPLE: CHEST
LOCATION SIMPLE: UPPER BACK
LOCATION SIMPLE: LEFT PRETIBIAL REGION
LOCATION SIMPLE: LOWER BACK
LOCATION SIMPLE: RIGHT UPPER BACK
LOCATION SIMPLE: LEFT FOREARM
LOCATION SIMPLE: NECK

## 2024-05-09 ASSESSMENT — LOCATION DETAILED DESCRIPTION DERM
LOCATION DETAILED: SUPERIOR LUMBAR SPINE
LOCATION DETAILED: MIDDLE STERNUM
LOCATION DETAILED: LEFT DISTAL DORSAL FOREARM
LOCATION DETAILED: LEFT PROXIMAL RADIAL DORSAL FOREARM
LOCATION DETAILED: LEFT CENTRAL LATERAL NECK
LOCATION DETAILED: SUPERIOR THORACIC SPINE
LOCATION DETAILED: LEFT DISTAL PRETIBIAL REGION
LOCATION DETAILED: RIGHT INFERIOR MEDIAL UPPER BACK

## 2024-05-09 ASSESSMENT — LOCATION ZONE DERM
LOCATION ZONE: ARM
LOCATION ZONE: NECK
LOCATION ZONE: LEG
LOCATION ZONE: TRUNK

## 2024-05-09 NOTE — PROCEDURE: LIQUID NITROGEN (COSMETIC)
Spray Paint Technique: No
Billing Information: Bill by Static Price
Consent: The patient's consent was obtained including but not limited to risks of crusting, scabbing, blistering, scarring, darker or lighter pigmentary change, recurrence, incomplete removal and infection. The patient understands that the procedure is cosmetic in nature and is not covered by insurance.
Post-Care Instructions: I reviewed with the patient in detail post-care instructions. Patient is to wear sunprotection, and avoid picking at any of the treated lesions. Pt may apply Vaseline to crusted or scabbing areas.
Detail Level: Detailed
Price (Use Numbers Only, No Special Characters Or $): 0
Spray Paint Text: The liquid nitrogen was applied to the skin utilizing a spray paint frosting technique.
Show Spray Paint Technique Variable?: Yes

## 2024-06-24 ENCOUNTER — PATIENT OUTREACH (OUTPATIENT)
Dept: HEALTH INFORMATION MANAGEMENT | Facility: OTHER | Age: 89
End: 2024-06-24
Payer: MEDICARE

## 2024-06-24 NOTE — PROGRESS NOTES
06/24/2024    @1410 SW spoke with Fartun, pt's daughter. Pt already has POA paperwork done with an  and she will bring it to the dr. Appointment. Fartun stated that she needs help to take care of her mom as her memory is declining. Agreed to meet on 06/25/2024 at 1515 prior to PCP appointment.    06/25/2024  @1520 SW met with Denae and her daughter Fartun at PCP office. [facility] SW assessment was conducted, areas of needs identified.    Social Work Assessment  Community Care Management    Synopsis: Pt was referred to  for assistance with POA and navigating care. Pt lives alone, has memory issues that started to progress.    Living Situation/Home Environment: Pt lives alone in a 2-story condo. Pt stated that her house is safe and clean. Pt's daughter assists with grocery shopping, cooks, cleans. Pt's neighbors are aware of her condition, check on the pt frequently, have keys to her home in case of emergency.  Discussed   Help at home.  Informed Fartun of in-home service agencies, how they operate, rates, limitations, non-coverage by medical insurance.  Provided list of agencies. Fartun stated that it can be useful and she will call them.  2. Alzheimer's association respite voucher application  Explained how the program operates, advised to contact for extra resources.  Fartun was interested, signed forms, signature from PCP requested.  This SW will fax application to Alzheimer's association.  3. Placement  Fartun and Denae stated that are goo for now but might look into this option  Pt will not qualify to any state/[facility] assistance program (high income, assets and savings). Provided list of memory care facilities in [place]. Not interested in a group home placement.  Financial Situation/Sources of Income: Pt and her daughter stated that financially they are ok, have income, savings and assets, no numbers were disclosed.  Transportation: Pt doesn't drive, Fartun provides all transportation. Informed pt of  non-emergency rides to medical appointments provided by [facility]. SW provided a contact information for SCP .  Support System: Pt's daughter, Trent, is her primary support, no other family in [facility]. Pt's neighbors assist when needed and check on Denae. Pt denied lack of social interactions.  Mental Health/Substance Abuse Hx: Pt denies drinking alcohol smoking, or taking elicit drugs. No SI/Hi. Trent stated that pt's memory is getting worse, asked for referrals to neurology, advised to discuss with PCP. Trent also stated that Denae has visual and auditory hallucinations (reports hearing and seeing people when no one can see them). Advised to discuss behavioral health referral for baseline assessment. No history of mental health illness.  Ability to Obtain Basic Resources: Pt stated that she is slow but ok. Trent cooks, cleans, provide transportation. Pt is not cooking anymore as she can switch on the stove and stairs at it without doing anything. Denae blocked the stove. Trent assists with medication management, coordinating appointments, paying bills.  Physical Functioning: Pt stated she is slow but fine,denies recent falls. Denae confirmed that she is not aware of any falls but she is not with the pt all the time and is not sure how well pt reports events. Denae stated that she has exercise equipment at home and tries to exercise every day. Pt used to teach piliates for a long time.  Pt is independent with bathing and dressing, but trent stated that she is getting worse and might need assistance soon.  No DME is used.  Patient's Perception of Needs: Pt stated she is ok. Trent would like to find extra help to watch Denae when she is out. Pt and her daughter might consider placement if/when is needed.  AD Discussion?: Trent informed this SW that they finally met with an  and all paperwork including POA was signed. Advised to bring it to the clinic to scan into the system.    Plan: 1.  During appointment EDWIN provided following resources  -[facility]    -In-home service agencies  -List of memory care facilities in [place]  2. EDWIN will fax application for Alzheimer's association respite voucher  3. SW to assist with neurology and behavioral health appointments if needed.    Goal:  Connect Denae to specialty, provide resources for in-home help and/or placement.        Barriers:  none identified.  Interventions: provide resources, assist with paperwork, connect with providers.     Start Date: 06/25/2024  Anticipated Goal Achievement Date:  07/09/2024        Next Scheduled patient outreach:  07/09/2024  Social Work Care Coordinator:  Allyssa Oliveros  Community Care Management:  [phone]         06/26/2024    @0910 EDWIN faxed an application for Alzheimer's association respite didi.

## 2024-06-25 ENCOUNTER — OFFICE VISIT (OUTPATIENT)
Dept: MEDICAL GROUP | Facility: LAB | Age: 89
End: 2024-06-25
Payer: MEDICARE

## 2024-06-25 VITALS
TEMPERATURE: 96.9 F | RESPIRATION RATE: 16 BRPM | WEIGHT: 97.66 LBS | BODY MASS INDEX: 19.17 KG/M2 | HEART RATE: 80 BPM | HEIGHT: 60 IN | OXYGEN SATURATION: 94 %

## 2024-06-25 DIAGNOSIS — M81.0 OSTEOPOROSIS WITHOUT CURRENT PATHOLOGICAL FRACTURE, UNSPECIFIED OSTEOPOROSIS TYPE: ICD-10-CM

## 2024-06-25 DIAGNOSIS — R41.89 COGNITIVE DECLINE: ICD-10-CM

## 2024-06-25 DIAGNOSIS — D69.6 THROMBOCYTOPENIA (HCC): ICD-10-CM

## 2024-06-25 DIAGNOSIS — Z91.81 AT RISK FOR FALLING: ICD-10-CM

## 2024-06-25 DIAGNOSIS — R41.3 MEMORY DEFICIT: ICD-10-CM

## 2024-06-25 DIAGNOSIS — M41.34 THORACOGENIC SCOLIOSIS OF THORACIC REGION: ICD-10-CM

## 2024-06-25 PROCEDURE — 99214 OFFICE O/P EST MOD 30 MIN: CPT | Performed by: STUDENT IN AN ORGANIZED HEALTH CARE EDUCATION/TRAINING PROGRAM

## 2024-06-25 ASSESSMENT — PATIENT HEALTH QUESTIONNAIRE - PHQ9: CLINICAL INTERPRETATION OF PHQ2 SCORE: 0

## 2024-06-25 ASSESSMENT — ENCOUNTER SYMPTOMS
FEVER: 0
SHORTNESS OF BREATH: 0
CHILLS: 0

## 2024-06-25 ASSESSMENT — FIBROSIS 4 INDEX: FIB4 SCORE: 3.84

## 2024-06-25 NOTE — ASSESSMENT & PLAN NOTE
Chronic-worsening  -supportive measures discussed   -patient recommend to exercises and follow up with proper dental care   -social work present in room during encounter in coordinating care

## 2024-06-25 NOTE — PROGRESS NOTES
"Subjective:     CC: follow up        HPI:       Problem   At Risk for Falling    Patient has had 5 falls in the past year      Cognitive Decline    Patient has been struggling with her cognition. She is unable to keep track of appointments and dates where she is showing abnormally early to events. Her brain MRI reported volume loss and she scored to 25/30 on her mini mental exam. I    6/25/24  -patient's daughter reports her cognition is declining          Current Outpatient Medications Ordered in Epic   Medication Sig Dispense Refill    acyclovir (ZOVIRAX) 400 MG tablet Take 400 mg by mouth 3 times a day as needed.      Nutritional Supplements (NUTRITIONAL SUPPLEMENT PO) Take  by mouth. Shark Liver Oil - 2 capsules two times daily      Iodine, Kelp, (KELP PO) Take 1 Capsule by mouth 1 time a day as needed.      Nutritional Supplements (NUTRITIONAL SUPPLEMENT PO) Take 2 Capsules by mouth in the morning, at noon, and at bedtime. \"Bone Up\" (calcium, vitamin D, vitamin K, magnesium)      Cholecalciferol (VITAMIN D3) 125 MCG (5000 UT) Cap Take 1 Capsule by mouth every day.      Nutritional Supplements (NUTRITIONAL SUPPLEMENT PO) Take 1 Capsule by mouth every day. [place] Oil       No current Epic-ordered facility-administered medications on file.           ROS:  Review of Systems   Constitutional:  Negative for chills and fever.   Respiratory:  Negative for shortness of breath.    Cardiovascular:  Negative for chest pain.       Objective:     Exam:  Pulse 80   Temp 36.1 °C (96.9 °F) (Temporal)   Resp 16   Ht 1.524 m (5')   Wt 44.3 kg (97 lb 10.6 oz)   SpO2 94%   BMI 19.07 kg/m²  Body mass index is 19.07 kg/m².    Physical Exam  Constitutional:       General: She is not in acute distress.     Appearance: She is not ill-appearing.   Pulmonary:      Effort: Pulmonary effort is normal.   Neurological:      Mental Status: She is alert.   Psychiatric:         Mood and Affect: Mood normal.         Behavior: Behavior " normal.         Thought Content: Thought content normal.         Judgment: Judgment normal.                   Assessment & Plan:     Problem List Items Addressed This Visit       At risk for falling     Chronic  -treatment limited by social determinants          Relevant Orders    Referral to Physical Therapy    Cognitive decline     Chronic-worsening  -supportive measures discussed   -patient recommend to exercises and follow up with proper dental care   -social work present in room during encounter in coordinating care          Relevant Orders    Referral to Neurology    Referral to Behavioral Health    Memory deficit    Relevant Orders    Referral to Neurology    Referral to Behavioral Health    Osteoporosis without current pathological fracture    Relevant Orders    Referral to Physical Therapy     Other Visit Diagnoses       Thoracogenic scoliosis of thoracic region        Relevant Orders    Referral to Orthopedics                Please note that this dictation was created using voice recognition software. I have made every reasonable attempt to correct obvious errors, but I expect that there are errors of grammar and possibly content that I did not discover before finalizing the note.

## 2024-06-26 SDOH — ECONOMIC STABILITY: HOUSING INSECURITY
IN THE LAST 12 MONTHS, WAS THERE A TIME WHEN YOU DID NOT HAVE A STEADY PLACE TO SLEEP OR SLEPT IN A SHELTER (INCLUDING NOW)?: NO

## 2024-06-26 SDOH — HEALTH STABILITY: PHYSICAL HEALTH: ON AVERAGE, HOW MANY DAYS PER WEEK DO YOU ENGAGE IN MODERATE TO STRENUOUS EXERCISE (LIKE A BRISK WALK)?: 5 DAYS

## 2024-06-26 SDOH — ECONOMIC STABILITY: FOOD INSECURITY: WITHIN THE PAST 12 MONTHS, YOU WORRIED THAT YOUR FOOD WOULD RUN OUT BEFORE YOU GOT MONEY TO BUY MORE.: NEVER TRUE

## 2024-06-26 SDOH — ECONOMIC STABILITY: FOOD INSECURITY: WITHIN THE PAST 12 MONTHS, THE FOOD YOU BOUGHT JUST DIDN'T LAST AND YOU DIDN'T HAVE MONEY TO GET MORE.: NEVER TRUE

## 2024-06-26 SDOH — ECONOMIC STABILITY: INCOME INSECURITY: IN THE LAST 12 MONTHS, WAS THERE A TIME WHEN YOU WERE NOT ABLE TO PAY THE MORTGAGE OR RENT ON TIME?: NO

## 2024-06-26 SDOH — ECONOMIC STABILITY: TRANSPORTATION INSECURITY
IN THE PAST 12 MONTHS, HAS THE LACK OF TRANSPORTATION KEPT YOU FROM MEDICAL APPOINTMENTS OR FROM GETTING MEDICATIONS?: NO

## 2024-06-26 SDOH — HEALTH STABILITY: PHYSICAL HEALTH: ON AVERAGE, HOW MANY MINUTES DO YOU ENGAGE IN EXERCISE AT THIS LEVEL?: 30 MIN

## 2024-06-26 SDOH — ECONOMIC STABILITY: TRANSPORTATION INSECURITY
IN THE PAST 12 MONTHS, HAS LACK OF TRANSPORTATION KEPT YOU FROM MEETINGS, WORK, OR FROM GETTING THINGS NEEDED FOR DAILY LIVING?: NO

## 2024-06-26 SDOH — ECONOMIC STABILITY: HOUSING INSECURITY: IN THE LAST 12 MONTHS, HOW MANY PLACES HAVE YOU LIVED?: 1

## 2024-06-26 ASSESSMENT — SOCIAL DETERMINANTS OF HEALTH (SDOH)
IN A TYPICAL WEEK, HOW MANY TIMES DO YOU TALK ON THE PHONE WITH FAMILY, FRIENDS, OR NEIGHBORS?: THREE TIMES A WEEK
DO YOU BELONG TO ANY CLUBS OR ORGANIZATIONS SUCH AS CHURCH GROUPS UNIONS, FRATERNAL OR ATHLETIC GROUPS, OR SCHOOL GROUPS?: YES
WITHIN THE LAST YEAR, HAVE YOU BEEN KICKED, HIT, SLAPPED, OR OTHERWISE PHYSICALLY HURT BY YOUR PARTNER OR EX-PARTNER?: NO
WITHIN THE LAST YEAR, HAVE YOU BEEN AFRAID OF YOUR PARTNER OR EX-PARTNER?: NO
HOW OFTEN DO YOU ATTENT MEETINGS OF THE CLUB OR ORGANIZATION YOU BELONG TO?: 1 TO 4 TIMES PER YEAR
IN THE PAST 12 MONTHS, HAS THE ELECTRIC, GAS, OIL, OR WATER COMPANY THREATENED TO SHUT OFF SERVICE IN YOUR HOME?: NO
WITHIN THE LAST YEAR, HAVE YOU BEEN HUMILIATED OR EMOTIONALLY ABUSED IN OTHER WAYS BY YOUR PARTNER OR EX-PARTNER?: NO
WITHIN THE LAST YEAR, HAVE TO BEEN RAPED OR FORCED TO HAVE ANY KIND OF SEXUAL ACTIVITY BY YOUR PARTNER OR EX-PARTNER?: NO
HOW OFTEN DO YOU ATTEND CHURCH OR RELIGIOUS SERVICES?: 1 TO 4 TIMES PER YEAR
HOW HARD IS IT FOR YOU TO PAY FOR THE VERY BASICS LIKE FOOD, HOUSING, MEDICAL CARE, AND HEATING?: NOT HARD AT ALL
HOW OFTEN DO YOU GET TOGETHER WITH FRIENDS OR RELATIVES?: THREE TIMES A WEEK

## 2024-06-26 ASSESSMENT — LIFESTYLE VARIABLES
HOW MANY STANDARD DRINKS CONTAINING ALCOHOL DO YOU HAVE ON A TYPICAL DAY: PATIENT DOES NOT DRINK
HOW OFTEN DO YOU HAVE SIX OR MORE DRINKS ON ONE OCCASION: NEVER
SKIP TO QUESTIONS 9-10: 1
AUDIT-C TOTAL SCORE: 0
HOW OFTEN DO YOU HAVE A DRINK CONTAINING ALCOHOL: NEVER

## 2024-06-26 ASSESSMENT — PATIENT HEALTH QUESTIONNAIRE - PHQ9: CLINICAL INTERPRETATION OF PHQ2 SCORE: 0

## 2024-07-02 ENCOUNTER — TELEPHONE (OUTPATIENT)
Dept: MEDICAL GROUP | Facility: LAB | Age: 89
End: 2024-07-02
Payer: MEDICARE

## 2024-07-09 ENCOUNTER — PATIENT OUTREACH (OUTPATIENT)
Dept: HEALTH INFORMATION MANAGEMENT | Facility: OTHER | Age: 89
End: 2024-07-09
Payer: MEDICARE

## 2024-07-18 ENCOUNTER — TELEPHONE (OUTPATIENT)
Dept: HEALTH INFORMATION MANAGEMENT | Facility: OTHER | Age: 89
End: 2024-07-18
Payer: MEDICARE

## 2024-07-30 ENCOUNTER — PHYSICAL THERAPY (OUTPATIENT)
Dept: PHYSICAL THERAPY | Facility: MEDICAL CENTER | Age: 89
End: 2024-07-30
Attending: STUDENT IN AN ORGANIZED HEALTH CARE EDUCATION/TRAINING PROGRAM
Payer: MEDICARE

## 2024-07-30 DIAGNOSIS — Z91.81 AT RISK FOR FALLING: ICD-10-CM

## 2024-07-30 DIAGNOSIS — M81.0 OSTEOPOROSIS WITHOUT CURRENT PATHOLOGICAL FRACTURE, UNSPECIFIED OSTEOPOROSIS TYPE: ICD-10-CM

## 2024-07-30 PROCEDURE — 97163 PT EVAL HIGH COMPLEX 45 MIN: CPT

## 2024-07-30 PROCEDURE — 97110 THERAPEUTIC EXERCISES: CPT

## 2024-07-31 SDOH — ECONOMIC STABILITY: GENERAL: QUALITY OF LIFE: FAIR

## 2024-07-31 ASSESSMENT — ACTIVITIES OF DAILY LIVING (ADL): POOR_BALANCE: 1

## 2024-07-31 NOTE — OP THERAPY DAILY TREATMENT
Outpatient Physical Therapy  DAILY TREATMENT     [facility] Outpatient Physical Therapy  [address]  [place] [address]  Phone:  [phone]  Fax:  [phone]    Date: 08/01/2024    Patient: Denae Rivas  YOB: 1935  MRN: [id number]     Time Calculation    Start time: 1615  Stop time: 1700 Time Calculation (min): 45 minutes         Chief Complaint: Fall and Loss Of Balance    Visit #: 2    SUBJECTIVE:  Pt states that she is feeling a little tired today. Daughter Fartun reports that this is an off day for her mom today.     OBJECTIVE:  Current objective measures:   Wilson Balance Total Score (0-56): 38   SpO2 96%, 74 bpm, BP: 145/86 mmHg  BP after Nustep warm up: 138/65 mmHg      Therapeutic Exercises (CPT 28876):     1. NuStep, x5 minutes, cardiovascular warm up    3. STS, 2x 5, added to hep      Therapeutic Exercise Summary: Pt performed these exercises with instruction and SPV.  Provided handout of these exercises for daily HEP.     Therapeutic Treatments and Modalities:     1. Neuromuscular Re-education (CPT 50232), WILSON assesment, stair navigation    Time-based treatments/modalities:      ASSESSMENT:   Response to treatment: Pt with flat affect today and appearing easily distracted, requiring frequent cues/conversation to attend to task. WILSON balance assessment revealed pt is 38/56 ; falls risk. Assessed stair navigation as pt has stairs at home, pt able to perform IND with BUE on railings, very slow movement up/down stairs but decent stability with ascending/descending. Will work towards increasing strength in BLE and single leg/narrow balance tasks next session.    PLAN/RECOMMENDATIONS:   Plan for treatment: therapy treatment to continue next visit.  Planned interventions for next visit: continue with current treatment.

## 2024-08-01 ENCOUNTER — PHYSICAL THERAPY (OUTPATIENT)
Dept: PHYSICAL THERAPY | Facility: MEDICAL CENTER | Age: 89
End: 2024-08-01
Attending: STUDENT IN AN ORGANIZED HEALTH CARE EDUCATION/TRAINING PROGRAM
Payer: MEDICARE

## 2024-08-01 DIAGNOSIS — Z91.81 AT RISK FOR FALLING: ICD-10-CM

## 2024-08-01 DIAGNOSIS — M81.0 OSTEOPOROSIS WITHOUT CURRENT PATHOLOGICAL FRACTURE, UNSPECIFIED OSTEOPOROSIS TYPE: ICD-10-CM

## 2024-08-01 PROCEDURE — 97112 NEUROMUSCULAR REEDUCATION: CPT

## 2024-08-01 PROCEDURE — 97110 THERAPEUTIC EXERCISES: CPT

## 2024-08-01 ASSESSMENT — BALANCE ASSESSMENTS
SITTING TO STANDING: 3
LONG VERSION TOTAL SCORE (MAX 56): 38
STANDING UNSUPPORTED WITH EYES CLOSED: 3
SITTING UNSUPPORTED: 4
REACHING FORWARD WITH OUTSTRETCHED ARM WHILE STANDING: 2
LOOK OVER LEFT AND RIGHT SHOULDERS WHILE STANDING: 3
MEDICARE IMPAIRMENT PERCENTAGE: 32
STANDING UNSUPPORTED: 4
LONG VERSION TOTAL SCORE (MAX 56): 38
STANDING TO SITTING: 4
TURN 360 DEGREES: 3
STANDING UNSUPPORTED WITH FEET TOGETHER: 4
STANDING ON ONE LEG: 0
STANDING UNSUPPORTED ONE FOOT IN FRONT: 0
TRANSFERS: 3
PLACE ALTERNATE FOOT ON STEP OR STOOL WHILE STANDING UNSUPPORTED: 2
PICK UP OBJECT FROM THE FLOOR FROM A STANDING POSITION: 3

## 2024-08-07 NOTE — OP THERAPY DAILY TREATMENT
Outpatient Physical Therapy  DAILY TREATMENT     [facility] Outpatient Physical Therapy  [address]  [place] [address]  Phone:  [phone]  Fax:  [phone]    Date: 08/08/2024    Patient: Denae Rivas  YOB: 1935  MRN: [id number]     Time Calculation    Start time: 1600  Stop time: 1645 Time Calculation (min): 45 minutes         Chief Complaint: Fall and Back Problem    Visit #: 3    SUBJECTIVE:  Pt states that she is doing good today. Daughter states that her mother is more aware and alert today compared to last session.     OBJECTIVE:  Current objective measures:     BP: 128/86 mmHg      Therapeutic Exercises (CPT 08764):     1. NuStep, x7 min 48 seconds, cardiovascular warm up; 1 lap    3. STS, 2x 5, added to hep      Therapeutic Exercise Summary: Pt performed these exercises with instruction and SPV.  Provided handout of these exercises for daily HEP.     Therapeutic Treatments and Modalities:     1. Neuromuscular Re-education (CPT 37751), in // bars    Therapeutic Treatment and Modalities Summary: SLS  FT EO/EC On airex  Tandem stance:  Standing marching     Time-based treatments/modalities:      ASSESSMENT:   Response to treatment: Pt with improved alertness today and eager to participate. Increased resistance during STS today which pt tolerated well. All balance challenges today provided good challenge, only slight LOB during FT EC on airex and during tandem stance which required CGA. Will work towards increasing strength in BLE and single leg/narrow balance tasks next session.     PLAN/RECOMMENDATIONS:   Plan for treatment: therapy treatment to continue next visit.  Planned interventions for next visit: continue with current treatment.

## 2024-08-08 ENCOUNTER — PHYSICAL THERAPY (OUTPATIENT)
Dept: PHYSICAL THERAPY | Facility: MEDICAL CENTER | Age: 89
End: 2024-08-08
Attending: STUDENT IN AN ORGANIZED HEALTH CARE EDUCATION/TRAINING PROGRAM
Payer: MEDICARE

## 2024-08-08 DIAGNOSIS — Z91.81 AT RISK FOR FALLING: ICD-10-CM

## 2024-08-08 DIAGNOSIS — M81.0 OSTEOPOROSIS WITHOUT CURRENT PATHOLOGICAL FRACTURE, UNSPECIFIED OSTEOPOROSIS TYPE: ICD-10-CM

## 2024-08-08 PROCEDURE — 97110 THERAPEUTIC EXERCISES: CPT

## 2024-08-08 PROCEDURE — 97112 NEUROMUSCULAR REEDUCATION: CPT

## 2024-08-13 ENCOUNTER — TELEPHONE (OUTPATIENT)
Dept: MEDICAL GROUP | Facility: LAB | Age: 89
End: 2024-08-13
Payer: MEDICARE

## 2024-08-15 ENCOUNTER — APPOINTMENT (OUTPATIENT)
Dept: PHYSICAL THERAPY | Facility: MEDICAL CENTER | Age: 89
End: 2024-08-15
Attending: STUDENT IN AN ORGANIZED HEALTH CARE EDUCATION/TRAINING PROGRAM
Payer: MEDICARE

## 2024-08-20 ENCOUNTER — PHYSICAL THERAPY (OUTPATIENT)
Dept: PHYSICAL THERAPY | Facility: MEDICAL CENTER | Age: 89
End: 2024-08-20
Attending: STUDENT IN AN ORGANIZED HEALTH CARE EDUCATION/TRAINING PROGRAM
Payer: MEDICARE

## 2024-08-20 DIAGNOSIS — Z91.81 AT RISK FOR FALLING: ICD-10-CM

## 2024-08-20 PROCEDURE — 97112 NEUROMUSCULAR REEDUCATION: CPT

## 2024-08-20 PROCEDURE — 97110 THERAPEUTIC EXERCISES: CPT

## 2024-08-20 ASSESSMENT — BALANCE ASSESSMENTS
REACHING FORWARD WITH OUTSTRETCHED ARM WHILE STANDING: 3
SITTING UNSUPPORTED: 4
STANDING UNSUPPORTED WITH FEET TOGETHER: 3
TURN 360 DEGREES: 2
STANDING ON ONE LEG: 2
PLACE ALTERNATE FOOT ON STEP OR STOOL WHILE STANDING UNSUPPORTED: 2
SITTING TO STANDING: 4
TRANSFERS: 4
LONG VERSION TOTAL SCORE (MAX 56): 43
STANDING UNSUPPORTED WITH EYES CLOSED: 3
STANDING TO SITTING: 4
STANDING UNSUPPORTED: 4
MEDICARE IMPAIRMENT PERCENTAGE: 23
PICK UP OBJECT FROM THE FLOOR FROM A STANDING POSITION: 3
LOOK OVER LEFT AND RIGHT SHOULDERS WHILE STANDING: 3
STANDING UNSUPPORTED ONE FOOT IN FRONT: 2
LONG VERSION TOTAL SCORE (MAX 56): 43

## 2024-08-20 NOTE — OP THERAPY DAILY TREATMENT
Outpatient Physical Therapy  DAILY TREATMENT     [facility] Outpatient Physical Therapy  [address]  [place] [address]  Phone:  [phone]  Fax:  [phone]    Date: 08/20/2024    Patient: Denae Rivas  YOB: 1935  MRN: [id number]     Time Calculation    Start time: 1547  Stop time: 1638 Time Calculation (min): 51 minutes         Chief Complaint: Difficulty Walking and Loss Of Balance    Visit #: 4    SUBJECTIVE:  Pt present with daughter, Fartun. Stating patient does minimal hep at home. Patient reporting she has had no falls or loss of balance. Daughter stating she has potentially lost her balance. Patient reporting she overall feels the same.         OBJECTIVE:  Current objective measures:     See PN     Therapeutic Exercises (CPT 76051):     1. NuStep, x8 min 22 sec, cardiovascular warm up; 1 lap    3. STS, 2x 5, review of hep    4. verbal review of hep    5. review of goals    6. objective measures      Therapeutic Exercise Summary: Pt performed these exercises with instruction and SPV.  Provided handout of these exercises for daily HEP.     Therapeutic Treatments and Modalities:     1. Neuromuscular Re-education (CPT 84842), STS testing; Grier test    Time-based treatments/modalities:      ASSESSMENT:   Response to treatment:   See PN       PLAN/RECOMMENDATIONS:   Plan for treatment: therapy treatment to continue next visit.  Planned interventions for next visit: continue with current treatment.

## 2024-08-21 ENCOUNTER — TELEPHONE (OUTPATIENT)
Dept: MEDICAL GROUP | Facility: LAB | Age: 89
End: 2024-08-21
Payer: MEDICARE

## 2024-08-21 NOTE — OP THERAPY PROGRESS SUMMARY
Outpatient Physical Therapy  PROGRESS SUMMARY NOTE      [facility] Outpatient Physical Therapy  [address]  [place] [address]  Phone:  [phone]  Fax:  [phone]    Date of Visit: 08/20/2024    Patient: Denae Rivas  YOB: 1935  MRN: [id number]     Referring Provider: Herman Neal D.O.  [address]  [address]  [place] [address]   Referring Diagnosis History of falling [Z91.81];Age-related osteoporosis without current pathological fracture [M81.0]     Visit Diagnoses     ICD-10-CM   1. At risk for falling  Z91.81       Rehab Potential: fair/poor    Progress Report Period: 7/30/24-8/20/24    Functional Assessment Used  Wilson Balance Total Score (0-56): 43       Objective Findings and Assessment:   Patient progression towards goals: Pt seen for 4 visits overall. Pt present with daughter, Fartun. Daughter stating patient does minimal hep at home. Patient reporting she has had no falls or loss of balance. Daughter stating she has potentially lost her balance but pt denies. Patient reporting she overall feels the same at home. Pt is demonstrating some improvements in WILSON balance test and 5XSTS testing but continues to present as a fall risk. Pt will likely benefit from a few additional sessions of skilled physical therapy to improve balance and function at home. Of note, compliance to hep has been an issue; educated pt and daughter today that cont'd functional changes need to be made in order to continue with skilled PT. Will reassess balance again formally in 4 weeks.     Objective findings and assessment details: BP: 116/78 mmHg    5x STS:   Trial 1: 24 seconds   Trial 2: 11 seconds (reiterated instructions to pt with demo for clear understanding)   with poor eccentric control, utilizes back of knees on plinf in order to obtain full standing postion 3x during test.      Balance: Wilson balance test: 43/56 (<45 indicates incr fall risk)    Gait:  pt ambulates with slow alayna, path deviation laterally and overall staggered pattern.     Goals:   Short Term Goals:   1. Pt will report independence and compliance with written HEP (ongoing/partially met with caregiver encouragement)  2. Pt will report 0 falls (Met; daughter reporting imbalance and mentions pt is not always forthcoming about falls/LOB)  3. Assess floor transfer, stair navigation, and dynamic balance (Not met; ongoing)  4. Perform WILSON (Met)    Short term goal time span:  2-4 weeks      Long Term Goals:    Long Term Goals:  (ongoing goals)  1. Pt will report independence and compliance with written HEP   2. Pt will report 0 falls  3. Pt will demo global BLE strength to >/= to 4+/5 in order to improve functional mobility and global strength   4. Pt will demo significant improvement on WILSON within MCID in order to reduce risk of falls at home  5. Pt will demo significant improvement on ABC scale with MCID (eval: 63.43%)    Long term goal time span:  6-8 weeks    Plan:   Planned therapy interventions:  Neuromuscular Re-education (CPT 34163), Gait Training (CPT 79014), Therapeutic Exercise (CPT 96574) and Therapeutic Activities (CPT 86994)  Frequency:  2x month  Duration in weeks:  8  Plan details:  UPOC: 10/18/24      Referring provider co-signature:  I have reviewed this plan of care and my co-signature certifies the need for services.     Certification Period: 08/20/2024 to 10/18/24    Physician Signature: ________________________________ Date: ______________

## 2024-08-21 NOTE — LETTER
8/21/2024    Denae Rivas  [address]  [address]  [place] [address]        Dear Denae,    Your care is very important to us, and we have noticed that on 8/16/2024, you missed your appointment with Herman Neal D.O. at [facility]    We’re committed to providing you with the best care possible. Your appointment time is reserved for you and your provider to discuss any current or new health concerns and, together, determine the best plan of care for you. Please call [phone] to reschedule at your earliest convenience.    In some cases, Critical access hospital offers additional resources to make your healthcare more accessible, including transportation assistance, financial assistance and virtual visits. To learn more about these resources, please call [phone].    In order to keep you as informed as possible, below is a brief summary of our policy regarding missed appointments:  If a patient “No Shows”  three (3) or more appointments within a rolling 12-month period, they may be dismissed from the practice for failure to follow clinician recommendations.     If you have any concerns regarding the care you are receiving, please talk with your provider or call the office at [phone] and request to speak with the Practice . We’re committed to providing excellent care, and your feedback is invaluable.    Sincerely,    Herman Neal D.O.

## 2024-08-22 ENCOUNTER — APPOINTMENT (OUTPATIENT)
Dept: PHYSICAL THERAPY | Facility: MEDICAL CENTER | Age: 89
End: 2024-08-22
Attending: STUDENT IN AN ORGANIZED HEALTH CARE EDUCATION/TRAINING PROGRAM
Payer: MEDICARE

## 2024-08-26 ASSESSMENT — PATIENT HEALTH QUESTIONNAIRE - PHQ9
1. LITTLE INTEREST OR PLEASURE IN DOING THINGS: SEVERAL DAYS
2. FEELING DOWN, DEPRESSED, IRRITABLE, OR HOPELESS: NOT AT ALL

## 2024-08-26 ASSESSMENT — ENCOUNTER SYMPTOMS: GENERAL WELL-BEING: FAIR

## 2024-08-26 ASSESSMENT — ACTIVITIES OF DAILY LIVING (ADL): BATHING_REQUIRES_ASSISTANCE: 0

## 2024-08-28 NOTE — ASSESSMENT & PLAN NOTE
Chronic, ongoing since 2022. Noted last on CBC in 2024 with Plts 157. Etiology unknown. Denies excessive bleeding, bruising. Follow up with PCP at least annually for continued monitoring and management.     Latest Reference Range & Units 02/25/22 10:34 01/30/24 21:27   Platelet Count 164 - 446 K/uL 107 (L) 157 (L)   (L): Data is abnormally low

## 2024-08-28 NOTE — ASSESSMENT & PLAN NOTE
Chronic, stable. Last DEXA 2019 with osteoporosis of the lumbar spine and proximal left femur with T scores of -3.1 and -2.5 respectively. Denies hx of fragility fracture. Advise calcium and vitamin D supplementation with weightbearing exercises as tolerated. Follow up with PCP at least annually for continued monitoring and management.

## 2024-08-29 ENCOUNTER — APPOINTMENT (OUTPATIENT)
Dept: RADIOLOGY | Facility: MEDICAL CENTER | Age: 89
End: 2024-08-29
Attending: EMERGENCY MEDICINE
Payer: MEDICARE

## 2024-08-29 ENCOUNTER — APPOINTMENT (OUTPATIENT)
Dept: PHYSICAL THERAPY | Facility: MEDICAL CENTER | Age: 89
End: 2024-08-29
Attending: STUDENT IN AN ORGANIZED HEALTH CARE EDUCATION/TRAINING PROGRAM
Payer: MEDICARE

## 2024-08-29 ENCOUNTER — OFFICE VISIT (OUTPATIENT)
Dept: FAMILY PLANNING/WOMEN'S HEALTH CLINIC | Facility: PHYSICIAN GROUP | Age: 89
End: 2024-08-29
Attending: FAMILY MEDICINE
Payer: MEDICARE

## 2024-08-29 ENCOUNTER — HOSPITAL ENCOUNTER (EMERGENCY)
Facility: MEDICAL CENTER | Age: 89
End: 2024-08-29
Attending: EMERGENCY MEDICINE
Payer: MEDICARE

## 2024-08-29 VITALS
TEMPERATURE: 98 F | HEART RATE: 79 BPM | RESPIRATION RATE: 16 BRPM | HEIGHT: 60 IN | SYSTOLIC BLOOD PRESSURE: 199 MMHG | DIASTOLIC BLOOD PRESSURE: 83 MMHG | BODY MASS INDEX: 19.04 KG/M2 | WEIGHT: 97 LBS | OXYGEN SATURATION: 94 %

## 2024-08-29 DIAGNOSIS — D69.6 THROMBOCYTOPENIA (HCC): ICD-10-CM

## 2024-08-29 DIAGNOSIS — M81.0 OSTEOPOROSIS WITHOUT CURRENT PATHOLOGICAL FRACTURE, UNSPECIFIED OSTEOPOROSIS TYPE: ICD-10-CM

## 2024-08-29 DIAGNOSIS — R41.3 MEMORY DEFICIT: ICD-10-CM

## 2024-08-29 DIAGNOSIS — G31.9 CEREBRAL ATROPHY (HCC): ICD-10-CM
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
## 2024-08-29 LAB
ALBUMIN SERPL BCP-MCNC: 4.1 G/DL (ref 3.2–4.9)
ALBUMIN/GLOB SERPL: 1.5 G/DL
ALP SERPL-CCNC: 52 U/L (ref 30–99)
ALT SERPL-CCNC: 12 U/L (ref 2–50)
ANION GAP SERPL CALC-SCNC: 15 MMOL/L (ref 7–16)
AST SERPL-CCNC: 22 U/L (ref 12–45)
BASOPHILS # BLD AUTO: 1 % (ref 0–1.8)
BASOPHILS # BLD: 0.07 K/UL (ref 0–0.12)
BILIRUB SERPL-MCNC: 0.5 MG/DL (ref 0.1–1.5)
BUN SERPL-MCNC: 24 MG/DL (ref 8–22)
CALCIUM ALBUM COR SERPL-MCNC: 9.5 MG/DL (ref 8.5–10.5)
CALCIUM SERPL-MCNC: 9.6 MG/DL (ref 8.4–10.2)
CHLORIDE SERPL-SCNC: 97 MMOL/L (ref 96–112)
CO2 SERPL-SCNC: 24 MMOL/L (ref 20–33)
CREAT SERPL-MCNC: 0.72 MG/DL (ref 0.5–1.4)
EOSINOPHIL # BLD AUTO: 0.03 K/UL (ref 0–0.51)
EOSINOPHIL NFR BLD: 0.4 % (ref 0–6.9)
ERYTHROCYTE [DISTWIDTH] IN BLOOD BY AUTOMATED COUNT: 47.2 FL (ref 35.9–50)
GFR SERPLBLD CREATININE-BSD FMLA CKD-EPI: 80 ML/MIN/1.73 M 2
GLOBULIN SER CALC-MCNC: 2.8 G/DL (ref 1.9–3.5)
GLUCOSE SERPL-MCNC: 83 MG/DL (ref 65–99)
HCT VFR BLD AUTO: 45.6 % (ref 37–47)
HGB BLD-MCNC: 14.6 G/DL (ref 12–16)
IMM GRANULOCYTES # BLD AUTO: 0.03 K/UL (ref 0–0.11)
IMM GRANULOCYTES NFR BLD AUTO: 0.4 % (ref 0–0.9)
LYMPHOCYTES # BLD AUTO: 0.74 K/UL (ref 1–4.8)
LYMPHOCYTES NFR BLD: 11.1 % (ref 22–41)
MCH RBC QN AUTO: 29.1 PG (ref 27–33)
MCHC RBC AUTO-ENTMCNC: 32 G/DL (ref 32.2–35.5)
MCV RBC AUTO: 90.8 FL (ref 81.4–97.8)
MONOCYTES # BLD AUTO: 0.3 K/UL (ref 0–0.85)
MONOCYTES NFR BLD AUTO: 4.5 % (ref 0–13.4)
NEUTROPHILS # BLD AUTO: 5.51 K/UL (ref 1.82–7.42)
NEUTROPHILS NFR BLD: 82.6 % (ref 44–72)
NRBC # BLD AUTO: 0 K/UL
NRBC BLD-RTO: 0 /100 WBC (ref 0–0.2)
PLATELET # BLD AUTO: 124 K/UL (ref 164–446)
PMV BLD AUTO: 10.8 FL (ref 9–12.9)
POTASSIUM SERPL-SCNC: 4.5 MMOL/L (ref 3.6–5.5)
PROT SERPL-MCNC: 6.9 G/DL (ref 6–8.2)
RBC # BLD AUTO: 5.02 M/UL (ref 4.2–5.4)
SODIUM SERPL-SCNC: 136 MMOL/L (ref 135–145)
WBC # BLD AUTO: 6.7 K/UL (ref 4.8–10.8)

## 2024-08-29 PROCEDURE — 72125 CT NECK SPINE W/O DYE: CPT

## 2024-08-29 PROCEDURE — 70450 CT HEAD/BRAIN W/O DYE: CPT

## 2024-08-29 PROCEDURE — 80053 COMPREHEN METABOLIC PANEL: CPT

## 2024-08-29 PROCEDURE — 72128 CT CHEST SPINE W/O DYE: CPT

## 2024-08-29 PROCEDURE — 85025 COMPLETE CBC W/AUTO DIFF WBC: CPT

## 2024-08-29 PROCEDURE — 96374 THER/PROPH/DIAG INJ IV PUSH: CPT

## 2024-08-29 PROCEDURE — 99284 EMERGENCY DEPT VISIT MOD MDM: CPT

## 2024-08-29 PROCEDURE — 36415 COLL VENOUS BLD VENIPUNCTURE: CPT

## 2024-08-29 PROCEDURE — 72131 CT LUMBAR SPINE W/O DYE: CPT

## 2024-08-29 ASSESSMENT — FIBROSIS 4 INDEX: FIB4 SCORE: 3.84

## 2024-08-30 NOTE — ED TRIAGE NOTES
"BIB daughter for following complaints.     Chief Complaint   Patient presents with    T-5000 GLF     Pt was doing some \"stair steppers\" and missed a step. Pt fell back and hit her neck. Denies hitting head, does not think she lost consciousness and is not on blood thinners. No obv neuro deficits noted in triage. Pt complaining of neck pain. C collar applied in triage.     Neck Pain     BP (!) 172/71   Pulse 67   Temp 36.7 °C (98 °F) (Temporal)   Resp 14   Ht 1.524 m (5')   Wt 44 kg (97 lb)   SpO2 94%   Breastfeeding No   BMI 18.94 kg/m²     "

## 2024-08-30 NOTE — ED NOTES
Pt states she fell and hit her head when she missed a step today about 1.5 hours ago. Pt states she does not remember when this happened but described the event well. Pts daughter states this happened about an hour and a half ago. Pt denies blood thinners or loss of consciousness. Pt AO x 1 at baseline according to daughter.

## 2024-08-30 NOTE — DISCHARGE INSTRUCTIONS
Today you were seen in the emergency department after a fall.  CT imaging of your head, neck, and back did not show any acute traumatic injuries.  You can use Tylenol and ibuprofen at home for pain.  Return if you have any worsening symptoms.

## 2024-08-30 NOTE — ED PROVIDER NOTES
"ED Provider Note    CHIEF COMPLAINT  Chief Complaint   Patient presents with    T-5000 GLF     Pt was doing some \"stair steppers\" and missed a step. Pt fell back and hit her neck. Denies hitting head, does not think she lost consciousness and is not on blood thinners. No obv neuro deficits noted in triage. Pt complaining of neck pain. C collar applied in triage.     Neck Pain       EXTERNAL RECORDS REVIEWED  Other no external records reviewed.    HPI/ROS  LIMITATION TO HISTORY   Select: : None  OUTSIDE HISTORIAN(S):  Family daughter, ODALYS Rivas is a 88 y.o. female with history of who presents to the emergency department after a ground-level fall.  Patient was reportedly practicing stepping up and down off the chair, which is a move she practices at physical therapy for conditioning.  She lost her footing and fell backwards.  She hit the back of her head.  She does not take a blood thinner and she did not lose consciousness.  She has a history of severe scoliosis and complains of neck and back pain.  No preceding chest pain, shortness of breath, palpitations.    PAST MEDICAL HISTORY   has a past medical history of Anesthesia, CATARACT, Hypertension, and Shingles (01/01/2009).    SURGICAL HISTORY   has a past surgical history that includes mammoplasty augmentation (1976/85/94); cataract extraction with iol (2002); breast implant revision (9/27/2010); mastopexy (9/27/2010); other (1985); abdominoplasty (1/24/2011); breast implant revision (1/24/2011); liposuction (1/24/2011); capsulectomy (1/24/2011); and breast augmentation with implant (76,84,94,10).    FAMILY HISTORY  Family History   Problem Relation Age of Onset    Diabetes Other     Heart Disease Other     Hypertension Other     Heart Disease Father        SOCIAL HISTORY  Social History     Tobacco Use    Smoking status: Never    Smokeless tobacco: Never   Vaping Use    Vaping status: Never Used   Substance and Sexual Activity    Alcohol use: No " "    Comment: quit 30 years     Drug use: No    Sexual activity: Not on file       CURRENT MEDICATIONS  Home Medications       Reviewed by Pritesh Najera R.N. (Registered Nurse) on 08/29/24 at 1723  Med List Status: Not Addressed     Medication Last Dose Status   acyclovir (ZOVIRAX) 400 MG tablet  Active   Cholecalciferol (VITAMIN D3) 125 MCG (5000 UT) Cap  Active   Iodine, Kelp, (KELP PO)  Active   Nutritional Supplements (NUTRITIONAL SUPPLEMENT PO)  Active   Nutritional Supplements (NUTRITIONAL SUPPLEMENT PO)  Active   Nutritional Supplements (NUTRITIONAL SUPPLEMENT PO)  Active                  ALLERGIES  Allergies   Allergen Reactions    Tetracycline      \"felt intoxicated\"       PHYSICAL EXAM  VITAL SIGNS: BP (!) 199/83   Pulse 79   Temp 36.7 °C (98 °F) (Temporal)   Resp 16   Ht 1.524 m (5')   Wt 44 kg (97 lb)   SpO2 94%   Breastfeeding No   BMI 18.94 kg/m²    General: Well-appearing, no acute distress.   Eyes: EOM grossly intact BL.  Pupils equal and reactive bilaterally.  HENT: Oropharynx clear.  No facial trauma.  Neck: +Midline C spine tenderness. No step offs.   Lungs: Non-labored breathing. Clear to auscultation bilaterally. No wheezing or crackles.  Cardiac: Regular rate and rhythm. No murmurs. No lower extremity swelling. Equal and symmetric distal pulses. Well-perfused.  Abdomen: Soft, non-tender, non-distended. No rebound or guarding.   MSK: No joint swelling or erythema. Symmetric movement of all extremities. No deformities.  Spine: +T/L spine tenderness, no step offs.   Skin: No rashes, lesions, bruising, or petechiae. Well-perfused.   Neuro: Grossly nonfocal neurologic exam. Face symmetric. Normal mentation.     EKG/LABS  CBC and CMP without actionable abnormalities.     RADIOLOGY/PROCEDURES   I have independently interpreted the diagnostic imaging associated with this visit and am waiting the final reading from the radiologist.   My preliminary interpretation is as follows:   CT head: No " traumatic intracranial.  CT C/T/spine: No obvious traumatic fracture or subluxation.    Radiologist interpretation:  CT-LSPINE W/O PLUS RECONS   Final Result      1.  No acute fracture or dislocation of the lumbar spine.   2.  Scoliosis and spondylosis.      CT-TSPINE W/O PLUS RECONS   Final Result      1.  No acute fracture or dislocation of the thoracic spine.   2.  Significant thoracic scoliosis.      CT-CSPINE WITHOUT PLUS RECONS   Final Result      1.  Negative for fracture      2.  Multilevel degenerative change      3.  Carotid atherosclerotic plaque      CT-HEAD W/O   Final Result      1.  No acute intracranial abnormality      2.  Cerebral volume loss and white matter change             COURSE & MEDICAL DECISION MAKING    ASSESSMENT, COURSE AND PLAN  Care Narrative:   Denae Rivas is a 88 y.o. female with history of who presents to the emergency department after a ground-level fall.  On my initial assessment, ABCs are intact.  Vital signs are within normal limits.  She is in a c-collar.  She complains of neck and back pain.  No facial or head trauma, but she had unclear loss of consciousness.  She does not take a blood thinner.    CT imaging of the head negative for traumatic intracranial hemorrhage.  CT imaging of the C/T/L-spine negative for traumatic fractures.  Her c-collar was subsequently removed.  She was able to ambulate without issue.  I reviewed the imaging findings with the patient and her daughter.  I believe she is safe for discharge.  I reviewed strict return precautions, all of her questions were answered, and she was discharged in stable condition.    ADDITIONAL PROBLEMS MANAGED  N/A    DISPOSITION AND DISCUSSIONS  I have discussed management of the patient with the following physicians and SANJAY's: None    Discussion of management with other QHP or appropriate source(s): N/A     Escalation of care considered, and ultimately not performed: N/A    Barriers to care at this time, including  but not limited to:  N/A .     Decision tools and prescription drugs considered including, but not limited to:  N/A .    FINAL DIAGNOSIS  1. Fall, initial encounter         Electronically signed by: Trixie Fallon D.O., 8/29/2024 5:31 PM

## 2024-08-30 NOTE — ED NOTES
Flushed patients wound on back of head. Site is cleaned, dry, and intact.   Patient discharged. Walked out with daughter to home.

## 2024-09-18 ASSESSMENT — ACTIVITIES OF DAILY LIVING (ADL): BATHING_REQUIRES_ASSISTANCE: 0

## 2024-09-18 ASSESSMENT — PATIENT HEALTH QUESTIONNAIRE - PHQ9
2. FEELING DOWN, DEPRESSED, IRRITABLE, OR HOPELESS: NOT AT ALL
1. LITTLE INTEREST OR PLEASURE IN DOING THINGS: NOT AT ALL

## 2024-09-18 ASSESSMENT — ENCOUNTER SYMPTOMS: GENERAL WELL-BEING: GOOD

## 2024-09-19 ENCOUNTER — OFFICE VISIT (OUTPATIENT)
Dept: FAMILY PLANNING/WOMEN'S HEALTH CLINIC | Facility: PHYSICIAN GROUP | Age: 89
End: 2024-09-19
Payer: MEDICARE

## 2024-09-19 VITALS
WEIGHT: 95.7 LBS | DIASTOLIC BLOOD PRESSURE: 60 MMHG | HEIGHT: 60 IN | BODY MASS INDEX: 18.79 KG/M2 | SYSTOLIC BLOOD PRESSURE: 110 MMHG

## 2024-09-19 DIAGNOSIS — G31.9 CEREBRAL ATROPHY (HCC): ICD-10-CM

## 2024-09-19 DIAGNOSIS — D69.6 THROMBOCYTOPENIA (HCC): ICD-10-CM

## 2024-09-19 DIAGNOSIS — M81.0 OSTEOPOROSIS WITHOUT CURRENT PATHOLOGICAL FRACTURE, UNSPECIFIED OSTEOPOROSIS TYPE: ICD-10-CM

## 2024-09-19 DIAGNOSIS — Z91.81 HISTORY OF FALL: ICD-10-CM

## 2024-09-19 DIAGNOSIS — E03.8 SUBCLINICAL HYPOTHYROIDISM: ICD-10-CM

## 2024-09-19 DIAGNOSIS — F03.90 DEMENTIA, UNSPECIFIED DEMENTIA SEVERITY, UNSPECIFIED DEMENTIA TYPE, UNSPECIFIED WHETHER BEHAVIORAL, PSYCHOTIC, OR MOOD DISTURBANCE OR ANXIETY (HCC): ICD-10-CM

## 2024-09-19 PROCEDURE — 3078F DIAST BP <80 MM HG: CPT | Performed by: PHYSICIAN ASSISTANT

## 2024-09-19 PROCEDURE — 3074F SYST BP LT 130 MM HG: CPT | Performed by: PHYSICIAN ASSISTANT

## 2024-09-19 PROCEDURE — G0439 PPPS, SUBSEQ VISIT: HCPCS | Performed by: PHYSICIAN ASSISTANT

## 2024-09-19 PROCEDURE — 1126F AMNT PAIN NOTED NONE PRSNT: CPT | Performed by: PHYSICIAN ASSISTANT

## 2024-09-19 SDOH — ECONOMIC STABILITY: INCOME INSECURITY: HOW HARD IS IT FOR YOU TO PAY FOR THE VERY BASICS LIKE FOOD, HOUSING, MEDICAL CARE, AND HEATING?: NOT HARD AT ALL

## 2024-09-19 SDOH — ECONOMIC STABILITY: FOOD INSECURITY: WITHIN THE PAST 12 MONTHS, THE FOOD YOU BOUGHT JUST DIDN'T LAST AND YOU DIDN'T HAVE MONEY TO GET MORE.: NEVER TRUE

## 2024-09-19 SDOH — ECONOMIC STABILITY: FOOD INSECURITY: WITHIN THE PAST 12 MONTHS, YOU WORRIED THAT YOUR FOOD WOULD RUN OUT BEFORE YOU GOT MONEY TO BUY MORE.: NEVER TRUE

## 2024-09-19 ASSESSMENT — PAIN SCALES - GENERAL: PAINLEVEL: NO PAIN

## 2024-09-19 ASSESSMENT — FIBROSIS 4 INDEX: FIB4 SCORE: 4.51

## 2024-09-19 ASSESSMENT — PATIENT HEALTH QUESTIONNAIRE - PHQ9: CLINICAL INTERPRETATION OF PHQ2 SCORE: 0

## 2024-09-19 NOTE — ASSESSMENT & PLAN NOTE
New, worsening diagnosis made by psychiatrist, Dr. Maria Ines Bennett per pt's daughter report. Pt requires assistance in all ADLs which her daughter provides. She gets outside assistance once/week. Pt does live independently. Pt is established with social work; provided additional resources for [email or web address], [email or web address]. Follow up with PCP for further discussion and management.    Head CT 8/2024  IMPRESSION:     1.  No evidence of acute territorial infarct, intracranial hemorrhage or mass lesion.  2.  Moderate diffuse cerebral substance loss.  3.  Advanced microangiopathic ischemic change versus demyelination or gliosis.

## 2024-09-19 NOTE — PROGRESS NOTES
"     Comprehensive Health Assessment Program     Denae Rivas is a 89 y.o. here for her comprehensive health assessment.    Patient Active Problem List    Diagnosis Date Noted    Cerebral atrophy (HCC) 08/29/2024    History of fall 06/25/2024    Dementia (HCC) 08/10/2023    Memory deficit 07/21/2023    Visual changes 07/21/2023    Nonspecific abnormal function study, cardiovascular 04/14/2023    Encounter for completion of form with patient 07/19/2022    BMI 20.0-20.9, adult 06/30/2022    Subclinical hypothyroidism 06/30/2022    Thrombocytopenia (HCC) 03/22/2022    HSV infection 11/17/2021    History of basal cell carcinoma (BCC) of skin 11/17/2021    Body mass index (BMI) of 19.0-19.9 in adult 09/09/2021    Osteoporosis without current pathological fracture 09/09/2021       Current Outpatient Medications   Medication Sig Dispense Refill    acyclovir (ZOVIRAX) 400 MG tablet Take 400 mg by mouth 3 times a day as needed.      Nutritional Supplements (NUTRITIONAL SUPPLEMENT PO) Take  by mouth. Shark Liver Oil - 2 capsules two times daily      Iodine, Kelp, (KELP PO) Take 1 Capsule by mouth 1 time a day as needed.      Nutritional Supplements (NUTRITIONAL SUPPLEMENT PO) Take 2 Capsules by mouth in the morning, at noon, and at bedtime. \"Bone Up\" (calcium, vitamin D, vitamin K, magnesium)      Cholecalciferol (VITAMIN D3) 125 MCG (5000 UT) Cap Take 1 Capsule by mouth every day.      Nutritional Supplements (NUTRITIONAL SUPPLEMENT PO) Take 1 Capsule by mouth every day. Flint Oil       No current facility-administered medications for this visit.          Current supplements as per medication list.     Allergies:   Tetracycline  Social History     Tobacco Use    Smoking status: Never    Smokeless tobacco: Never   Vaping Use    Vaping status: Never Used   Substance Use Topics    Alcohol use: No     Comment: quit 30 years     Drug use: No     Family History   Problem Relation Age of Onset    Diabetes Other     Heart " Disease Other     Hypertension Other     Heart Disease Father      Denae  has a past medical history of Anesthesia, CATARACT, Hypertension, and Shingles (01/01/2009).   Past Surgical History:   Procedure Laterality Date    ABDOMINOPLASTY  1/24/2011    Performed by [facility] [place] at [facility]    BREAST IMPLANT REVISION  1/24/2011    Performed by [facility] at [facility]S    LIPOSUCTION  1/24/2011    Performed by [facility] at [facility] ORS    CAPSULECTOMY  1/24/2011    Performed by [facility] at [facility]    BREAST IMPLANT REVISION  9/27/2010    Performed by [facility] at [facility] ORS    MASTOPEXY  9/27/2010    Performed by [facility] at [facility]    CATARACT EXTRACTION WITH IOL  2002    OTHER  1985    rhinoplasty rhytidectomy    MAMMOPLASTY AUGMENTATION  1976/85/94    WI BREAST AUGMENTATION WITH IMPLANT  76,84,94,10       Screening:  In the last six months have you experienced any leakage of urine? Yes    Depression Screening  Little interest or pleasure in doing things?  0 - not at all  Feeling down, depressed , or hopeless? 0 - not at all  Patient Health Questionnaire Score: 0     If depressive symptoms identified deferred to follow up visit unless specifically addressed in assessment and plan.    Interpretation of PHQ-9 Total Score   Score Severity   1-4 No Depression   5-9 Mild Depression   10-14 Moderate Depression   15-19 Moderately Severe Depression   20-27 Severe Depression    Screening for Cognitive Impairment  Do you or any of your friends or family members have any concern about your memory? Yes  Three Minute Recall (Leader, Season, Table) 0/3    Shar clock face with all 12 numbers and set the hands to show 10 minutes after 11.  No 4  Cognitive concerns identified deferred for follow up unless specifically addressed in assessment and plan.    Fall Risk Assessment  Has the patient  had two or more falls in the last year or any fall with injury in the last year?  Yes    Safety Assessment  Do you always wear your seatbelt?  Yes  Any changes to home needed to function safely? Yes  Difficulty hearing.  Yes  Patient counseled about all safety risks that were identified.    Functional Assessment ADLs  Are there any barriers preventing you from cooking for yourself or meeting nutritional needs?  Yes. Daughter assist her in barriers  Are there any barriers preventing you from driving safely or obtaining transportation?  Yes. Daughter assist her in barriers    Are there any barriers preventing you from using a telephone or calling for help?  No    Are there any barriers preventing you from shopping?  No.    Are there any barriers preventing you from taking care of your own finances?  Yes Daughter assist her in barriers    Are there any barriers preventing you from managing your medications?  Yes Daughter assist her in barriers    Are there any barriers preventing you from showering, bathing or dressing yourself? No    Are there any barriers preventing you from doing housework or laundry? Yes  Are there any barriers preventing you from using the toilet?No  Are you currently engaging in any exercise or physical activity?  Yes. Walking daily     Self-Assessment of Health  What is your perception of your health? Good    Do you sleep more than six hours a night? Yes    In the past 7 days, how much did pain keep you from doing your normal work? None    Do you spend quality time with family or friends (virtually or in person)? Yes    Do you usually eat a heart healthy diet that constists of a variety of fruits, vegetables, whole grains and fiber? Yes    Do you eat foods high in fat and/or Fast Food more than three times per week? No    How concerned are you that your medical conditions are not being well managed? a little    Are you worried that in the next 2 months, you may not have stable housing that you  own, rent, or stay in as part of a household? No      Advance Care Planning  Do you have an Advance Directive, Living Will, Durable Power of , or POLST? Yes  Advance Directive   Durable Power of    is not on file - instructed patient to bring in a copy to scan into their chart      Health Maintenance Summary            Overdue - Zoster (Shingles) Vaccines (1 of 2) Never done      No completion history exists for this topic.              Overdue - IMM DTaP/Tdap/Td Vaccine (1 - Tdap) Overdue since 3/30/2001      03/29/2001  Imm Admin: TD Vaccine              Overdue - Pneumococcal Vaccine: 65+ Years (1 of 1 - PCV) Never done      No completion history exists for this topic.              Overdue - Influenza Vaccine (1) Never done      No completion history exists for this topic.              Overdue - COVID-19 Vaccine (1 - 2023-24 season) Never done      No completion history exists for this topic.              Bone Density Scan (Every 5 Years) Tentatively due on 12/18/2024 12/18/2019  DS-BONE DENSITY STUDY (DEXA)    09/25/2015  DS-BONE DENSITY STUDY (DEXA)    09/18/2014  DS-BONE DENSITY STUDY (DEXA)    10/12/2012  DS-BONE DENSITY STUDY (DEXA)    08/31/2011  DS-BONE DENSITY STUDY (DEXA)    Only the first 5 history entries have been loaded, but more history exists.              Annual Wellness Visit (Yearly) Next due on 9/19/2025 09/19/2024  Level of Service: NE ANNUAL WELLNESS VISIT-INCLUDES PPPS SUBSEQUE*    04/14/2023  Level of Service: NE ANNUAL WELLNESS VISIT-INCLUDES PPPS SUBSEQUE*    06/30/2022  Level of Service: NE ANNUAL WELLNESS VISIT-INCLUDES PPPS SUBSEQUE*    09/09/2021  Level of Service: NE ANNUAL WELLNESS VISIT-INCLUDES PPPS SUBSEQUE*              Hepatitis A Vaccine (Hep A) (Series Information) Aged Out      No completion history exists for this topic.              Hepatitis B Vaccine (Hep B) (Series Information) Aged Out      No completion history exists for this topic.               HPV Vaccines (Series Information) Aged Out      No completion history exists for this topic.              Polio Vaccine (Inactivated Polio) (Series Information) Aged Out      No completion history exists for this topic.              Meningococcal Immunization (Series Information) Aged Out      No completion history exists for this topic.              Discontinued - Mammogram  Discontinued        Frequency changed to Never automatically (Topic No Longer Applies)    06/06/2022  MA-SCREENING MAMMO BILAT W/IMPLANTS W/SCARLET W/CAD    04/02/2021  MA-SCREENING MAMMO BILAT W/IMPLANTS W/SCARLET W/CAD    09/11/2012  MA-SCREENING MAMMOGRAM W/ CAD    08/31/2011  MA-SCREEING MAMMOGRAM W/ CAD    Only the first 5 history entries have been loaded, but more history exists.              Discontinued - Colorectal Cancer Screening  Discontinued        Frequency changed to Never automatically (Topic No Longer Applies)    03/16/2013  OCCULT BLOOD FECES IMMUNOASSAY                    Patient Care Team:  Herman Neal D.O. as PCP - General (Internal Medicine)  Charleen Sawyer O.D. (Optometry)  Dav Rosa M.D. (Dermatology)  Jaylene Porter PT as Physical Therapist (Physical Therapy)      Financial Resource Strain: Low Risk  (9/19/2024)    Overall Financial Resource Strain (CARDIA)     Difficulty of Paying Living Expenses: Not hard at all      Transportation Needs: No Transportation Needs (9/19/2024)    PRAPARE - Transportation     Lack of Transportation (Medical): No     Lack of Transportation (Non-Medical): No   Recent Concern: Transportation Needs - Unmet Transportation Needs (7/20/2024)    PRAPARE - Transportation     Lack of Transportation (Medical): Yes     Lack of Transportation (Non-Medical): No      Food Insecurity: No Food Insecurity (9/19/2024)    Hunger Vital Sign     Worried About Running Out of Food in the Last Year: Never true     Ran Out of Food in the Last Year: Never true        Encounter Vitals  Blood Pressure :  110/60  Weight: 43.4 kg (95 lb 11.2 oz)  Height: 152.4 cm (5')  BMI (Calculated): 18.69  Pain Score: No pain     Alert, oriented in no acute distress.  Eye contact is good, speech goal directed, affect calm.    Assessment and Plan. The following treatment and monitoring plan is recommended:  Thrombocytopenia (HCC)  Chronic, ongoing since 2022. Noted last on CBC in 2024 with Plts 157. Etiology unknown. Denies excessive bleeding, bruising. Follow up with PCP at least annually for continued monitoring and management.       Latest Reference Range & Units 02/25/22 10:34 01/30/24 21:27   Platelet Count 164 - 446 K/uL 107 (L) 157 (L)   (L): Data is abnormally low    Osteoporosis without current pathological fracture  Chronic, stable. Last DEXA 2019 with osteoporosis of the lumbar spine and proximal left femur with T scores of -3.1 and -2.5 respectively. Denies hx of fragility fracture. Advise calcium and vitamin D supplementation with weightbearing exercises as tolerated. Follow up with PCP at least annually for continued monitoring and management.     Cerebral atrophy (HCC)  Dementia (HCC)  New, worsening diagnosis made by psychiatrist, Dr. Maria Ines Bennett per pt's daughter report. Pt requires assistance in all ADLs which her daughter provides. She gets outside assistance once/week. Pt does live independently. Pt is established with social work; provided additional resources for [email or web address], [email or web address]. Follow up with PCP for further discussion and management.    Head CT 8/2024  IMPRESSION:   1.  No evidence of acute territorial infarct, intracranial hemorrhage or mass lesion.  2.  Moderate diffuse cerebral substance loss.  3.  Advanced microangiopathic ischemic change versus demyelination or gliosis.    Subclinical hypothyroidism  Chronic, stable. TSH wnl as of 1/2024. She does not maintain on any levothyroxine. Follow up with PCP at least annually for continued monitoring and management.   Latest Reference Range &  Units 01/30/24 21:27   TSH 0.380 - 5.330 uIU/mL 2.600     History of fall  Chronic, ongoing. Pt reports 3 falls in the last year with the last occurring about 2 weeks ago. She is established with PT to address this. Pt daughter reports pt's home is optimized to reduce risk for falls. Follow up with PCP at least annually for continued monitoring and management.    Services suggested: No services needed at this time  Health Care Screening: Age-appropriate preventive services recommended by USPTF and ACIP covered by Medicare were discussed today. Services ordered if indicated and agreed upon by the patient.  Referrals offered: Community-based lifestyle interventions to reduce health risks and promote self-management and wellness, fall prevention, nutrition, physical activity, tobacco-use cessation, weight loss, and mental health services as per orders if indicated.    Discussion today about general wellness and lifestyle habits:    Prevent falls and reduce trip hazards; Cautioned about securing or removing rugs.  Have a working fire alarm and carbon monoxide detector.  Engage in regular physical activity and social activities.    Follow-up: Return for follow up visit with PCP as previously scheduled.

## 2024-09-19 NOTE — LETTER
[facility]  Herman Neal D.O.  [address]  [place] [address]  Fax: [phone]   Authorization for Release/Disclosure of   Protected Health Information   Name: DENAE RIVAS : 1935 SSN: [id number]   Address: [address] [place] [address]  [address]  [place] [address] Phone:    There are no phone numbers on file.   I authorize the entity listed below to release/disclose the PHI below to:   [facility]/Herman Neal D.O. and Elle Arroyo P.A.-C.   Provider or Entity Name:     Address   City, State, Zip   Phone:      Fax:     Reason for request: continuity of care   Information to be released:    [  ] LAST COLONOSCOPY,  including any PATH REPORT and follow-up  [  ] LAST FIT/COLOGUARD RESULT [  ] LAST DEXA  [  ] LAST MAMMOGRAM  [  ] LAST PAP  [  ] LAST LABS [  ] RETINA EXAM REPORT  [  ] IMMUNIZATION RECORDS  [  ] Release all info      [  ] Check here and initial the line next to each item to release ALL health information INCLUDING  _____ Care and treatment for drug and / or alcohol abuse  _____ HIV testing, infection status, or AIDS  _____ Genetic Testing    DATES OF SERVICE OR TIME PERIOD TO BE DISCLOSED: _____________  I understand and acknowledge that:  * This Authorization may be revoked at any time by you in writing, except if your health information has already been used or disclosed.  * Your health information that will be used or disclosed as a result of you signing this authorization could be re-disclosed by the recipient. If this occurs, your re-disclosed health information may no longer be protected by State or Federal laws.  * You may refuse to sign this Authorization. Your refusal will not affect your ability to obtain treatment.  * This Authorization becomes effective upon signing and will  on (date) __________.      If no date is indicated, this Authorization will  one (1) year from the signature date.    Name: Denae Rivas  Signature: Date:   2024      PLEASE FAX REQUESTED RECORDS BACK TO: [phone]

## 2024-09-19 NOTE — ASSESSMENT & PLAN NOTE
Chronic, ongoing. Pt reports 3 falls in the last year with the last occurring about 2 weeks ago. She is established with PT to address this. Pt daughter reports pt's home is optimized to reduce risk for falls. Follow up with PCP at least annually for continued monitoring and management.

## 2024-09-24 ENCOUNTER — APPOINTMENT (OUTPATIENT)
Dept: MEDICAL GROUP | Facility: LAB | Age: 89
End: 2024-09-24
Payer: MEDICARE

## 2024-12-02 ENCOUNTER — TELEPHONE (OUTPATIENT)
Dept: PHYSICAL THERAPY | Facility: MEDICAL CENTER | Age: 89
End: 2024-12-02
Payer: MEDICARE

## 2024-12-02 NOTE — OP THERAPY DISCHARGE SUMMARY
Outpatient Physical Therapy  DISCHARGE SUMMARY NOTE      [facility]  [address]  [place] [address]  Phone:  [phone]  Fax:  [phone]    Date of Visit: 12/02/2024    Patient: Denae Rivas  YOB: 1935  MRN: [id number]     Referring Provider: No referring provider defined for this encounter.   Referring Diagnosis No admission diagnoses are documented for this encounter.         Functional Assessment Used        Your patient is being discharged from Physical Therapy with the following comments:   Patient has failed to schedule or reschedule follow-up visits    Comments:  Pt was seen in skilled PT for 4 sessions with this provider and one other provider within the clinic. Pt has failed to schedule follow up appointments and will be discharged today due to failure to comply with POC.      Limitations Remaining:  Remaining limitations are unknown.     Recommendations:  D/C patient, if pt requests to return to receive additional skilled physical therapy services, please obtain new referral.       Jaylene Porter, PT    Date: 12/2/2024

## 2024-12-09 ENCOUNTER — HOSPITAL ENCOUNTER (OUTPATIENT)
Facility: MEDICAL CENTER | Age: 89
End: 2024-12-14
Attending: STUDENT IN AN ORGANIZED HEALTH CARE EDUCATION/TRAINING PROGRAM | Admitting: INTERNAL MEDICINE
Payer: MEDICARE

## 2024-12-09 DIAGNOSIS — E44.0 MODERATE PROTEIN MALNUTRITION (HCC): ICD-10-CM

## 2024-12-09 DIAGNOSIS — E87.1 HYPONATREMIA: ICD-10-CM

## 2024-12-09 DIAGNOSIS — R41.3 MEMORY DEFICIT: ICD-10-CM

## 2024-12-09 DIAGNOSIS — B00.9 HSV INFECTION: ICD-10-CM

## 2024-12-09 DIAGNOSIS — Z02.89 ENCOUNTER FOR COMPLETION OF FORM WITH PATIENT: ICD-10-CM

## 2024-12-09 DIAGNOSIS — F03.90 DEMENTIA, UNSPECIFIED DEMENTIA SEVERITY, UNSPECIFIED DEMENTIA TYPE, UNSPECIFIED WHETHER BEHAVIORAL, PSYCHOTIC, OR MOOD DISTURBANCE OR ANXIETY (HCC): ICD-10-CM

## 2024-12-09 DIAGNOSIS — Z85.828 HISTORY OF BASAL CELL CARCINOMA (BCC) OF SKIN: ICD-10-CM

## 2024-12-09 DIAGNOSIS — D69.6 THROMBOCYTOPENIA (HCC): ICD-10-CM

## 2024-12-09 DIAGNOSIS — T68.XXXA HYPOTHERMIA, INITIAL ENCOUNTER: ICD-10-CM

## 2024-12-09 DIAGNOSIS — Z91.81 HISTORY OF FALL: ICD-10-CM

## 2024-12-09 DIAGNOSIS — H53.9 VISUAL CHANGES: ICD-10-CM

## 2024-12-09 DIAGNOSIS — G93.41 ACUTE METABOLIC ENCEPHALOPATHY: ICD-10-CM

## 2024-12-09 DIAGNOSIS — M81.0 OSTEOPOROSIS WITHOUT CURRENT PATHOLOGICAL FRACTURE, UNSPECIFIED OSTEOPOROSIS TYPE: ICD-10-CM

## 2024-12-09 DIAGNOSIS — E03.8 SUBCLINICAL HYPOTHYROIDISM: ICD-10-CM

## 2024-12-09 DIAGNOSIS — G93.40 ACUTE ENCEPHALOPATHY: ICD-10-CM

## 2024-12-09 DIAGNOSIS — R94.30 NONSPECIFIC ABNORMAL FUNCTION STUDY, CARDIOVASCULAR: ICD-10-CM

## 2024-12-09 DIAGNOSIS — R41.82 ALTERED MENTAL STATUS, UNSPECIFIED ALTERED MENTAL STATUS TYPE: ICD-10-CM

## 2024-12-09 DIAGNOSIS — I24.89 DEMAND ISCHEMIA (HCC): ICD-10-CM

## 2024-12-09 DIAGNOSIS — G31.9 CEREBRAL ATROPHY (HCC): ICD-10-CM

## 2024-12-09 PROCEDURE — 51798 US URINE CAPACITY MEASURE: CPT

## 2024-12-09 PROCEDURE — 99285 EMERGENCY DEPT VISIT HI MDM: CPT

## 2024-12-09 PROCEDURE — 93005 ELECTROCARDIOGRAM TRACING: CPT | Mod: TC | Performed by: STUDENT IN AN ORGANIZED HEALTH CARE EDUCATION/TRAINING PROGRAM

## 2024-12-09 PROCEDURE — 36415 COLL VENOUS BLD VENIPUNCTURE: CPT

## 2024-12-09 ASSESSMENT — FIBROSIS 4 INDEX: FIB4 SCORE: 4.51

## 2024-12-10 ENCOUNTER — APPOINTMENT (OUTPATIENT)
Dept: RADIOLOGY | Facility: MEDICAL CENTER | Age: 89
End: 2024-12-10
Attending: INTERNAL MEDICINE
Payer: MEDICARE

## 2024-12-10 ENCOUNTER — APPOINTMENT (OUTPATIENT)
Dept: RADIOLOGY | Facility: MEDICAL CENTER | Age: 89
End: 2024-12-10
Attending: STUDENT IN AN ORGANIZED HEALTH CARE EDUCATION/TRAINING PROGRAM
Payer: MEDICARE

## 2024-12-10 ENCOUNTER — APPOINTMENT (OUTPATIENT)
Dept: CARDIOLOGY | Facility: MEDICAL CENTER | Age: 89
End: 2024-12-10
Attending: INTERNAL MEDICINE
Payer: MEDICARE

## 2024-12-10 PROBLEM — G93.41 ACUTE METABOLIC ENCEPHALOPATHY: Status: ACTIVE | Noted: 2024-12-10

## 2024-12-10 PROBLEM — I24.89 DEMAND ISCHEMIA (HCC): Status: ACTIVE | Noted: 2024-12-10

## 2024-12-10 PROBLEM — G93.40 ACUTE ENCEPHALOPATHY: Status: RESOLVED | Noted: 2024-12-09 | Resolved: 2024-12-10

## 2024-12-10 LAB
ALBUMIN SERPL BCP-MCNC: 4.4 G/DL (ref 3.2–4.9)
ALBUMIN/GLOB SERPL: 1.6 G/DL
ALP SERPL-CCNC: 59 U/L (ref 30–99)
ALT SERPL-CCNC: 15 U/L (ref 2–50)
AMPHET UR QL SCN: NEGATIVE
ANION GAP SERPL CALC-SCNC: 11 MMOL/L (ref 7–16)
APPEARANCE UR: CLEAR
AST SERPL-CCNC: 23 U/L (ref 12–45)
BARBITURATES UR QL SCN: NEGATIVE
BASOPHILS # BLD AUTO: 0.8 % (ref 0–1.8)
BASOPHILS # BLD: 0.05 K/UL (ref 0–0.12)
BENZODIAZ UR QL SCN: NEGATIVE
BILIRUB SERPL-MCNC: 0.4 MG/DL (ref 0.1–1.5)
BILIRUB UR QL STRIP.AUTO: NEGATIVE
BUN SERPL-MCNC: 27 MG/DL (ref 8–22)
BZE UR QL SCN: NEGATIVE
CALCIUM ALBUM COR SERPL-MCNC: 9.4 MG/DL (ref 8.5–10.5)
CALCIUM SERPL-MCNC: 9.7 MG/DL (ref 8.4–10.2)
CANNABINOIDS UR QL SCN: NEGATIVE
CHLORIDE SERPL-SCNC: 98 MMOL/L (ref 96–112)
CO2 SERPL-SCNC: 28 MMOL/L (ref 20–33)
COLOR UR: YELLOW
CORTIS SERPL-MCNC: 7.9 UG/DL (ref 0–23)
CREAT SERPL-MCNC: 0.83 MG/DL (ref 0.5–1.4)
EKG IMPRESSION: NORMAL
EOSINOPHIL # BLD AUTO: 0.03 K/UL (ref 0–0.51)
EOSINOPHIL NFR BLD: 0.5 % (ref 0–6.9)
ERYTHROCYTE [DISTWIDTH] IN BLOOD BY AUTOMATED COUNT: 47.3 FL (ref 35.9–50)
ETHANOL BLD-MCNC: <10.1 MG/DL
FENTANYL UR QL: NEGATIVE
FLUAV RNA SPEC QL NAA+PROBE: NEGATIVE
FLUBV RNA SPEC QL NAA+PROBE: NEGATIVE
GFR SERPLBLD CREATININE-BSD FMLA CKD-EPI: 68 ML/MIN/1.73 M 2
GLOBULIN SER CALC-MCNC: 2.8 G/DL (ref 1.9–3.5)
GLUCOSE SERPL-MCNC: 111 MG/DL (ref 65–99)
GLUCOSE UR STRIP.AUTO-MCNC: NEGATIVE MG/DL
HCT VFR BLD AUTO: 45.6 % (ref 37–47)
HGB BLD-MCNC: 14.5 G/DL (ref 12–16)
IMM GRANULOCYTES # BLD AUTO: 0.12 K/UL (ref 0–0.11)
IMM GRANULOCYTES NFR BLD AUTO: 2 % (ref 0–0.9)
KETONES UR STRIP.AUTO-MCNC: NEGATIVE MG/DL
LEUKOCYTE ESTERASE UR QL STRIP.AUTO: NEGATIVE
LV EJECT FRACT  99904: 72
LV EJECT FRACT MOD 2C 99903: 70.49
LV EJECT FRACT MOD 4C 99902: 76.58
LV EJECT FRACT MOD BP 99901: 72.56
LYMPHOCYTES # BLD AUTO: 0.95 K/UL (ref 1–4.8)
LYMPHOCYTES NFR BLD: 16.1 % (ref 22–41)
MCH RBC QN AUTO: 28.9 PG (ref 27–33)
MCHC RBC AUTO-ENTMCNC: 31.8 G/DL (ref 32.2–35.5)
MCV RBC AUTO: 91 FL (ref 81.4–97.8)
METHADONE UR QL SCN: NEGATIVE
MICRO URNS: NORMAL
MONOCYTES # BLD AUTO: 0.51 K/UL (ref 0–0.85)
MONOCYTES NFR BLD AUTO: 8.6 % (ref 0–13.4)
NEUTROPHILS # BLD AUTO: 4.24 K/UL (ref 1.82–7.42)
NEUTROPHILS NFR BLD: 72 % (ref 44–72)
NITRITE UR QL STRIP.AUTO: NEGATIVE
NRBC # BLD AUTO: 0 K/UL
NRBC BLD-RTO: 0 /100 WBC (ref 0–0.2)
NT-PROBNP SERPL IA-MCNC: 361 PG/ML (ref 0–125)
OPIATES UR QL SCN: NEGATIVE
OXYCODONE UR QL SCN: NEGATIVE
PCP UR QL SCN: NEGATIVE
PH UR STRIP.AUTO: 6 [PH] (ref 5–8)
PLATELET # BLD AUTO: 189 K/UL (ref 164–446)
PMV BLD AUTO: 10.1 FL (ref 9–12.9)
POTASSIUM SERPL-SCNC: 3.8 MMOL/L (ref 3.6–5.5)
PROPOXYPH UR QL SCN: NEGATIVE
PROT SERPL-MCNC: 7.2 G/DL (ref 6–8.2)
PROT UR QL STRIP: NEGATIVE MG/DL
RBC # BLD AUTO: 5.01 M/UL (ref 4.2–5.4)
RBC UR QL AUTO: NEGATIVE
RSV RNA SPEC QL NAA+PROBE: NEGATIVE
SARS-COV-2 RNA RESP QL NAA+PROBE: NOTDETECTED
SODIUM SERPL-SCNC: 137 MMOL/L (ref 135–145)
SP GR UR STRIP.AUTO: 1.02
SPECIMEN SOURCE: NORMAL
TROPONIN T SERPL-MCNC: 23 NG/L (ref 6–19)
TROPONIN T SERPL-MCNC: 26 NG/L (ref 6–19)
TSH SERPL DL<=0.005 MIU/L-ACNC: 4.26 UIU/ML (ref 0.38–5.33)
VIT B12 SERPL-MCNC: 1434 PG/ML (ref 211–911)
WBC # BLD AUTO: 5.9 K/UL (ref 4.8–10.8)

## 2024-12-10 PROCEDURE — A9270 NON-COVERED ITEM OR SERVICE: HCPCS | Performed by: INTERNAL MEDICINE

## 2024-12-10 PROCEDURE — 93306 TTE W/DOPPLER COMPLETE: CPT | Mod: 26 | Performed by: INTERNAL MEDICINE

## 2024-12-10 PROCEDURE — 82607 VITAMIN B-12: CPT

## 2024-12-10 PROCEDURE — 71045 X-RAY EXAM CHEST 1 VIEW: CPT

## 2024-12-10 PROCEDURE — G0378 HOSPITAL OBSERVATION PER HR: HCPCS

## 2024-12-10 PROCEDURE — 82533 TOTAL CORTISOL: CPT

## 2024-12-10 PROCEDURE — A9270 NON-COVERED ITEM OR SERVICE: HCPCS | Performed by: STUDENT IN AN ORGANIZED HEALTH CARE EDUCATION/TRAINING PROGRAM

## 2024-12-10 PROCEDURE — 82077 ASSAY SPEC XCP UR&BREATH IA: CPT

## 2024-12-10 PROCEDURE — 80053 COMPREHEN METABOLIC PANEL: CPT

## 2024-12-10 PROCEDURE — 83880 ASSAY OF NATRIURETIC PEPTIDE: CPT

## 2024-12-10 PROCEDURE — 97166 OT EVAL MOD COMPLEX 45 MIN: CPT

## 2024-12-10 PROCEDURE — 94760 N-INVAS EAR/PLS OXIMETRY 1: CPT

## 2024-12-10 PROCEDURE — 84443 ASSAY THYROID STIM HORMONE: CPT

## 2024-12-10 PROCEDURE — 70450 CT HEAD/BRAIN W/O DYE: CPT

## 2024-12-10 PROCEDURE — 36415 COLL VENOUS BLD VENIPUNCTURE: CPT

## 2024-12-10 PROCEDURE — 81003 URINALYSIS AUTO W/O SCOPE: CPT

## 2024-12-10 PROCEDURE — 0241U HCHG SARS-COV-2 COVID-19 NFCT DS RESP RNA 4 TRGT MIC: CPT

## 2024-12-10 PROCEDURE — 700102 HCHG RX REV CODE 250 W/ 637 OVERRIDE(OP): Performed by: STUDENT IN AN ORGANIZED HEALTH CARE EDUCATION/TRAINING PROGRAM

## 2024-12-10 PROCEDURE — 93306 TTE W/DOPPLER COMPLETE: CPT

## 2024-12-10 PROCEDURE — 85025 COMPLETE CBC W/AUTO DIFF WBC: CPT

## 2024-12-10 PROCEDURE — 84484 ASSAY OF TROPONIN QUANT: CPT | Mod: 91

## 2024-12-10 PROCEDURE — 80307 DRUG TEST PRSMV CHEM ANLYZR: CPT

## 2024-12-10 PROCEDURE — 99222 1ST HOSP IP/OBS MODERATE 55: CPT | Performed by: INTERNAL MEDICINE

## 2024-12-10 PROCEDURE — 74018 RADEX ABDOMEN 1 VIEW: CPT

## 2024-12-10 PROCEDURE — 700102 HCHG RX REV CODE 250 W/ 637 OVERRIDE(OP): Performed by: INTERNAL MEDICINE

## 2024-12-10 RX ORDER — POLYETHYLENE GLYCOL 3350 17 G/17G
1 POWDER, FOR SOLUTION ORAL ONCE
Status: COMPLETED | OUTPATIENT
Start: 2024-12-10 | End: 2024-12-10

## 2024-12-10 RX ORDER — POLYETHYLENE GLYCOL 3350 17 G/17G
17 POWDER, FOR SOLUTION ORAL DAILY
Qty: 30 EACH | Refills: 0 | Status: SHIPPED | OUTPATIENT
Start: 2024-12-10

## 2024-12-10 RX ORDER — ONDANSETRON 2 MG/ML
4 INJECTION INTRAMUSCULAR; INTRAVENOUS EVERY 4 HOURS PRN
Status: DISCONTINUED | OUTPATIENT
Start: 2024-12-10 | End: 2024-12-14 | Stop reason: HOSPADM

## 2024-12-10 RX ORDER — LABETALOL HYDROCHLORIDE 5 MG/ML
10 INJECTION, SOLUTION INTRAVENOUS EVERY 4 HOURS PRN
Status: DISCONTINUED | OUTPATIENT
Start: 2024-12-10 | End: 2024-12-14 | Stop reason: HOSPADM

## 2024-12-10 RX ORDER — ACETAMINOPHEN 325 MG/1
650 TABLET ORAL EVERY 6 HOURS PRN
Status: DISCONTINUED | OUTPATIENT
Start: 2024-12-10 | End: 2024-12-14 | Stop reason: HOSPADM

## 2024-12-10 RX ORDER — AMOXICILLIN 250 MG
2 CAPSULE ORAL EVERY EVENING
Status: DISCONTINUED | OUTPATIENT
Start: 2024-12-10 | End: 2024-12-14 | Stop reason: HOSPADM

## 2024-12-10 RX ORDER — ONDANSETRON 4 MG/1
4 TABLET, ORALLY DISINTEGRATING ORAL EVERY 4 HOURS PRN
Status: DISCONTINUED | OUTPATIENT
Start: 2024-12-10 | End: 2024-12-14 | Stop reason: HOSPADM

## 2024-12-10 RX ORDER — POLYETHYLENE GLYCOL 3350 17 G/17G
1 POWDER, FOR SOLUTION ORAL
Status: DISCONTINUED | OUTPATIENT
Start: 2024-12-10 | End: 2024-12-14 | Stop reason: HOSPADM

## 2024-12-10 RX ADMIN — RIVAROXABAN 10 MG: 10 TABLET, FILM COATED ORAL at 05:37

## 2024-12-10 RX ADMIN — SENNOSIDES AND DOCUSATE SODIUM 2 TABLET: 50; 8.6 TABLET ORAL at 17:15

## 2024-12-10 RX ADMIN — RIVAROXABAN 10 MG: 10 TABLET, FILM COATED ORAL at 17:15

## 2024-12-10 RX ADMIN — POLYETHYLENE GLYCOL 3350 1 PACKET: 17 POWDER, FOR SOLUTION ORAL at 01:30

## 2024-12-10 SDOH — ECONOMIC STABILITY: TRANSPORTATION INSECURITY
IN THE PAST 12 MONTHS, HAS LACK OF RELIABLE TRANSPORTATION KEPT YOU FROM MEDICAL APPOINTMENTS, MEETINGS, WORK OR FROM GETTING THINGS NEEDED FOR DAILY LIVING?: NO

## 2024-12-10 ASSESSMENT — COGNITIVE AND FUNCTIONAL STATUS - GENERAL
DRESSING REGULAR LOWER BODY CLOTHING: A LITTLE
TOILETING: A LITTLE
DAILY ACTIVITIY SCORE: 19
DAILY ACTIVITIY SCORE: 19
TOILETING: A LITTLE
SUGGESTED CMS G CODE MODIFIER MOBILITY: CK
WALKING IN HOSPITAL ROOM: A LITTLE
MOBILITY SCORE: 18
PERSONAL GROOMING: A LITTLE
SUGGESTED CMS G CODE MODIFIER DAILY ACTIVITY: CK
DRESSING REGULAR UPPER BODY CLOTHING: A LITTLE
DRESSING REGULAR LOWER BODY CLOTHING: A LITTLE
DRESSING REGULAR UPPER BODY CLOTHING: A LITTLE
MOVING FROM LYING ON BACK TO SITTING ON SIDE OF FLAT BED: A LITTLE
HELP NEEDED FOR BATHING: A LITTLE
HELP NEEDED FOR BATHING: A LITTLE
PERSONAL GROOMING: A LITTLE
CLIMB 3 TO 5 STEPS WITH RAILING: A LITTLE
SUGGESTED CMS G CODE MODIFIER DAILY ACTIVITY: CK
STANDING UP FROM CHAIR USING ARMS: A LITTLE
MOVING TO AND FROM BED TO CHAIR: A LITTLE
TURNING FROM BACK TO SIDE WHILE IN FLAT BAD: A LITTLE

## 2024-12-10 ASSESSMENT — ACTIVITIES OF DAILY LIVING (ADL): TOILETING: INDEPENDENT

## 2024-12-10 ASSESSMENT — LIFESTYLE VARIABLES
HOW MANY TIMES IN THE PAST YEAR HAVE YOU HAD 5 OR MORE DRINKS IN A DAY: 0
ALCOHOL_USE: NO
EVER FELT BAD OR GUILTY ABOUT YOUR DRINKING: NO
ON A TYPICAL DAY WHEN YOU DRINK ALCOHOL HOW MANY DRINKS DO YOU HAVE: 0
TOTAL SCORE: 0
HAVE PEOPLE ANNOYED YOU BY CRITICIZING YOUR DRINKING: NO
DOES PATIENT WANT TO STOP DRINKING: NO
TOTAL SCORE: 0
EVER HAD A DRINK FIRST THING IN THE MORNING TO STEADY YOUR NERVES TO GET RID OF A HANGOVER: NO
CONSUMPTION TOTAL: NEGATIVE
HAVE YOU EVER FELT YOU SHOULD CUT DOWN ON YOUR DRINKING: NO
AVERAGE NUMBER OF DAYS PER WEEK YOU HAVE A DRINK CONTAINING ALCOHOL: 0
TOTAL SCORE: 0

## 2024-12-10 ASSESSMENT — SOCIAL DETERMINANTS OF HEALTH (SDOH)
WITHIN THE PAST 12 MONTHS, THE FOOD YOU BOUGHT JUST DIDN'T LAST AND YOU DIDN'T HAVE MONEY TO GET MORE: NEVER TRUE
WITHIN THE LAST YEAR, HAVE YOU BEEN KICKED, HIT, SLAPPED, OR OTHERWISE PHYSICALLY HURT BY YOUR PARTNER OR EX-PARTNER?: NO
WITHIN THE LAST YEAR, HAVE YOU BEEN HUMILIATED OR EMOTIONALLY ABUSED IN OTHER WAYS BY YOUR PARTNER OR EX-PARTNER?: NO
IN THE PAST 12 MONTHS, HAS THE ELECTRIC, GAS, OIL, OR WATER COMPANY THREATENED TO SHUT OFF SERVICE IN YOUR HOME?: NO
WITHIN THE LAST YEAR, HAVE TO BEEN RAPED OR FORCED TO HAVE ANY KIND OF SEXUAL ACTIVITY BY YOUR PARTNER OR EX-PARTNER?: NO
WITHIN THE PAST 12 MONTHS, YOU WORRIED THAT YOUR FOOD WOULD RUN OUT BEFORE YOU GOT THE MONEY TO BUY MORE: NEVER TRUE
WITHIN THE LAST YEAR, HAVE YOU BEEN AFRAID OF YOUR PARTNER OR EX-PARTNER?: NO

## 2024-12-10 ASSESSMENT — PATIENT HEALTH QUESTIONNAIRE - PHQ9
1. LITTLE INTEREST OR PLEASURE IN DOING THINGS: NOT AT ALL
SUM OF ALL RESPONSES TO PHQ9 QUESTIONS 1 AND 2: 0
2. FEELING DOWN, DEPRESSED, IRRITABLE, OR HOPELESS: NOT AT ALL

## 2024-12-10 ASSESSMENT — GAIT ASSESSMENTS: DISTANCE (FEET): 80

## 2024-12-10 ASSESSMENT — FIBROSIS 4 INDEX: FIB4 SCORE: 2.77

## 2024-12-10 ASSESSMENT — PAIN DESCRIPTION - PAIN TYPE: TYPE: ACUTE PAIN

## 2024-12-10 NOTE — PROGRESS NOTES
Admitted this AM  88 yo with mild cognitive impairement admitted for acute toxic metabolic encephalopathy  Labs and imaging so far unremarkable  No obvious infection  I obtained echo which is relatively unremarkable  I obtained Cxr which was unremarkable  B12 high    She is currently AAO3 likely baseline  PT OT possible SNF?

## 2024-12-10 NOTE — ED NOTES
Pt is not able to tell me what medications she is taking, called pts daughter (Fartun) at [phone], left message.

## 2024-12-10 NOTE — ASSESSMENT & PLAN NOTE
Last Office Visit   10/15/19  Upcoming: 10/27/2020    Last Refill  gabapentin (NEURONTIN) 100 MG capsule 180 capsule 0 6/15/2020     Sig - Route: Take 2 capsules by mouth at bedtime.        No Protocol  Please Advise   With acute worsening  No obvious cause  Will need safer environment  Ordered for SLP cognitive assessment  IPR evaluating to see if appropriate for them  SNF referral sent  Tele sitter ordered last night for impulsiveness, dc'd this AM

## 2024-12-10 NOTE — DISCHARGE INSTRUCTIONS
You can continue the stool softeners as prescribed.   I highly recommend discussing recent developments with your primary care doctor and follow up regarding the home health referrals.

## 2024-12-10 NOTE — ED TRIAGE NOTES
Chief Complaint   Patient presents with    ALOC     Pt presents d/t altered mental status x 2 days. Pt's daughter states that she has stopped answering her phone and when she went to do a wellness check earlier this evening the patient was very confused and had defecated all over her master bedroom and then walked through it. PT AAOx2     BP (!) 179/87   Pulse 75   Temp (!) 35.5 °C (95.9 °F) (Temporal)   Resp 20   Wt 45 kg (99 lb 3.3 oz)   SpO2 92%   BMI 19.38 kg/m²

## 2024-12-10 NOTE — ASSESSMENT & PLAN NOTE
Unclear cause at this time  No obvious evidence of infection  Her UA is normal  TSH/B12 unremarkable  CXR clear  Echo relatively unremarkable  She's improved off abx  Does have dementia and has some degree of waxing and waning delirium  PT/OT has recommended SNF  Updated daughter at length over the phone regarding concerns of going home, and likely need for rehab which she was agreeable. In the meantime she will opt for HH after rehab and likely move in to live with her.  Spoke with daughter - [facility]'d [facility] for [facility]

## 2024-12-10 NOTE — PROGRESS NOTES
Called daughter to do admit profile but went to voicemail.  Left a message to call back.    Echo was completed

## 2024-12-10 NOTE — H&P
Hospital Medicine History & Physical Note    Date of Service  12/10/2024    Primary Care Physician  Herman Neal D.O.    Consultants  none    Specialist Names: n/a    Code Status  DNAR/DNI    Chief Complaint  Chief Complaint   Patient presents with    ALOC     Pt presents d/t altered mental status x 2 days. Pt's daughter states that she has stopped answering her phone and when she went to do a wellness check earlier this evening the patient was very confused and had defecated all over her master bedroom and then walked through it. PT AAOx2       History of Presenting Illness  Denae Rivas is a 89 y.o. female who presented 12/9/2024 with worsening confusion.  Apparently at baseline the patient will answer her daughter's questions fairly promptly the daughter was unable to reach the patient by phone today apparently the patient was having difficulty figuring out how to answer her phone so the daughter asked a neighbor to check on her and also went over to the patient's home the patient was found with a depends full of fecal material as well as feces all over her bedroom she did not seem to understand what was going on, she was unable to answer any of the daughter's questions.    Evaluation in the emergency room does not reveal acute cause, patient is not safe to return home to her current living environment      I discussed the plan of care with family.    Review of Systems  Review of Systems   Unable to perform ROS: Mental status change       Past Medical History   has a past medical history of Anesthesia, CATARACT, Hypertension, and Shingles (01/01/2009).    Surgical History   has a past surgical history that includes mammoplasty augmentation (1976/85/94); cataract extraction with iol (2002); breast implant revision (9/27/2010); mastopexy (9/27/2010); other (1985); abdominoplasty (1/24/2011); breast implant revision (1/24/2011); liposuction (1/24/2011); capsulectomy (1/24/2011); and pr breast augmentation  "with implant (76,84,94,10).  Facial plastic surgery as well    Family History  family history includes Diabetes in an other family member; Heart Disease in her father and another family member; Hypertension in an other family member.   Family history reviewed with patient. There is no family history that is pertinent to the chief complaint.     Social History   reports that she has never smoked. She has never used smokeless tobacco. She reports that she does not drink alcohol and does not use drugs.  The patient lives independently but her daughter who lives in town checks on her several times per day, she assist the patient with bathing and dressing, the patient is afraid to fall therefore does not seem to be able to do these things herself.  Currently she does not use a cane or walker.    Allergies  Allergies   Allergen Reactions    Tetracycline      \"felt intoxicated\"       Medications  Prior to Admission Medications   Prescriptions Last Dose Informant Patient Reported? Taking?   Cholecalciferol (VITAMIN D3) 125 MCG (5000 UT) Cap   Yes No   Sig: Take 1 Capsule by mouth every day.   Iodine, Kelp, (KELP PO)   Yes No   Sig: Take 1 Capsule by mouth 1 time a day as needed.   Nutritional Supplements (NUTRITIONAL SUPPLEMENT PO)   Yes No   Sig: Take  by mouth. Shark Liver Oil - 2 capsules two times daily   Nutritional Supplements (NUTRITIONAL SUPPLEMENT PO)   Yes No   Sig: Take 2 Capsules by mouth in the morning, at noon, and at bedtime. \"Bone Up\" (calcium, vitamin D, vitamin K, magnesium)   Nutritional Supplements (NUTRITIONAL SUPPLEMENT PO)   Yes No   Sig: Take 1 Capsule by mouth every day. [place] Oil   acyclovir (ZOVIRAX) 400 MG tablet   Yes No   Sig: Take 400 mg by mouth 3 times a day as needed.      Facility-Administered Medications: None       Physical Exam  Temp:  [35.5 °C (95.9 °F)-36.3 °C (97.4 °F)] 36.3 °C (97.4 °F)  Pulse:  [66-75] 66  Resp:  [18-20] 18  BP: (170-179)/(77-87) 170/77  SpO2:  [92 %-93 %] 93 " %  Blood Pressure : (!) 170/77   Temperature: 36.3 °C (97.4 °F)   Pulse: 66   Respiration: 18   Pulse Oximetry: 93 %       Physical Exam  Vitals and nursing note reviewed.   Constitutional:       Comments: Thin, frail   HENT:      Head: Normocephalic and atraumatic.      Nose: Nose normal.      Mouth/Throat:      Mouth: Mucous membranes are moist.   Eyes:      Extraocular Movements: Extraocular movements intact.      Pupils: Pupils are equal, round, and reactive to light.   Cardiovascular:      Rate and Rhythm: Normal rate and regular rhythm.   Pulmonary:      Effort: Pulmonary effort is normal.      Breath sounds: Normal breath sounds.   Abdominal:      General: Abdomen is flat. There is no distension.      Palpations: Abdomen is soft.      Tenderness: There is no abdominal tenderness.   Musculoskeletal:      Right lower leg: Edema present.      Left lower leg: Edema present.   Neurological:      General: No focal deficit present.      Mental Status: She is alert.      Cranial Nerves: No cranial nerve deficit.      Motor: No weakness.      Comments: Knows she is in the hospital, recognizes daughter, is able to tell me the month   Psychiatric:      Comments: flat         Laboratory:  Recent Labs     12/10/24  0000   WBC 5.9   RBC 5.01   HEMOGLOBIN 14.5   HEMATOCRIT 45.6   MCV 91.0   MCH 28.9   MCHC 31.8*   RDW 47.3   PLATELETCT 189   MPV 10.1     Recent Labs     12/10/24  0000   SODIUM 137   POTASSIUM 3.8   CHLORIDE 98   CO2 28   GLUCOSE 111*   BUN 27*   CREATININE 0.83   CALCIUM 9.7     Recent Labs     12/10/24  0000   ALTSGPT 15   ASTSGOT 23   ALKPHOSPHAT 59   TBILIRUBIN 0.4   GLUCOSE 111*         Recent Labs     12/10/24  0000   NTPROBNP 361*         Recent Labs     12/10/24  0000 12/10/24  0238   TROPONINT 26* 23*       Imaging:  [id number] VIEW   Final Result         1.  Moderate stool in the colon suggests changes of constipation, otherwise nonspecific bowel gas pattern in the upper abdomen   2.  Left lower  lobe infiltrates      CT-HEAD W/O   Final Result         1.  No acute intracranial abnormality is identified, there are nonspecific white matter changes, commonly associated with small vessel ischemic disease.  Associated mild cerebral atrophy is noted.   2.  Atherosclerosis.                   X-Ray:  I have personally reviewed the images and compared with prior images.    Assessment/Plan:  Justification for Admission Status  I anticipate this patient is appropriate for observation status at this time because no reversible acute process    Patient will need a Med/Surg bed on MEDICAL service .  The need is secondary to altered mentation.    * Acute metabolic encephalopathy- (present on admission)  Assessment & Plan  Check b12- other causes r'd/o    Hypothermia- (present on admission)  Assessment & Plan  TSH OK    Dementia (HCC)- (present on admission)  Assessment & Plan  With acute worsening  No obvious cause  Will need safer environment    Thrombocytopenia (HCC)- (present on admission)  Assessment & Plan  Mild, stable, no bleeding        VTE prophylaxis: Xarelto 10 mg daily as prophylaxis

## 2024-12-10 NOTE — ED PROVIDER NOTES
ED Provider Note    CHIEF COMPLAINT  Chief Complaint   Patient presents with    ALOC     Pt presents d/t altered mental status x 2 days. Pt's daughter states that she has stopped answering her phone and when she went to do a wellness check earlier this evening the patient was very confused and had defecated all over her master bedroom and then walked through it. PT AAOx2       EXTERNAL RECORDS REVIEWED  Outpatient Notes seen 9/19/2024, history of dementia, history of subclinical hypothyroidism, thrombocytopenia, HSV infection, cerebral atrophy,    HPI/ROS  LIMITATION TO HISTORY   Select: Altered mental status / Confusion  OUTSIDE HISTORIAN(S):  Family daughter, primary given caregivers available for history and provides the majority of history.    Denae Rivas is a 89 y.o. female who presents with worsening confusion from baseline, patient is baseline A/O x 2 continues to be A/O x 2 but was acting progressively, per her daughter, who is her primary caregiver, and visits her mother several times daily, she was found in her house, with her feet covered in feces, after she had not responded to her phone today.  The daughter reports she saw her yesterday she was at her baseline.  She had no medical complaints or abnormalities.  She does have chronically poor appetite.  She has a history of hypothyroidism but is not on any prescription medications.  She denies any falls.  Her daughter notes that her feet been hurting more and that her legs have been more swollen from baseline.  No change in appetite.  No history of UTI.  Daughter does note her mental status does wax and wane.  Patient denies any alcohol use.  She denies any chest  or abdominal pain.  Daughter notes feces were formed, light brown stool.  Not currently on any oral medications.  Mother does note that the patient has been coughing after swallowing frequently.    PAST MEDICAL HISTORY   has a past medical history of Anesthesia, CATARACT, Hypertension,  "and Shingles (01/01/2009).    SURGICAL HISTORY   has a past surgical history that includes mammoplasty augmentation (1976/85/94); cataract extraction with iol (2002); breast implant revision (9/27/2010); mastopexy (9/27/2010); other (1985); abdominoplasty (1/24/2011); breast implant revision (1/24/2011); liposuction (1/24/2011); capsulectomy (1/24/2011); and breast augmentation with implant (76,84,94,10).    FAMILY HISTORY  Family History   Problem Relation Age of Onset    Diabetes Other     Heart Disease Other     Hypertension Other     Heart Disease Father        SOCIAL HISTORY  Social History     Tobacco Use    Smoking status: Never    Smokeless tobacco: Never   Vaping Use    Vaping status: Never Used   Substance and Sexual Activity    Alcohol use: No     Comment: quit 30 years     Drug use: No    Sexual activity: Not on file       CURRENT MEDICATIONS  Home Medications       Reviewed by Jc Garcia R.N. (Registered Nurse) on 12/09/24 at 2318  Med List Status: Not Addressed     Medication Last Dose Status   acyclovir (ZOVIRAX) 400 MG tablet  Active   Cholecalciferol (VITAMIN D3) 125 MCG (5000 UT) Cap  Active   Iodine, Kelp, (KELP PO)  Active   Nutritional Supplements (NUTRITIONAL SUPPLEMENT PO)  Active   Nutritional Supplements (NUTRITIONAL SUPPLEMENT PO)  Active   Nutritional Supplements (NUTRITIONAL SUPPLEMENT PO)  Active                  Audit from Redirected Encounters    **Home medications have not yet been reviewed for this encounter**         ALLERGIES  Allergies   Allergen Reactions    Tetracycline      \"felt intoxicated\"       PHYSICAL EXAM  VITAL SIGNS: BP (!) 170/77   Pulse 66   Temp 36.3 °C (97.4 °F) (Oral)   Resp 18   Wt 45 kg (99 lb 3.3 oz)   SpO2 93%   BMI 19.38 kg/m²    General: Pleasantly confused, A/O x 2.  No acute distress.  Nontoxic.  Cachectic.  Head: Normocephalic atraumatic.  Temporal muscle wasting bilaterally  Eyes: Extraocular motion intact  Neck: Supple, no " rigidity  Cardiovascular: Regular rate and rhythm no murmurs rubs or gallops  Respiratory: Clear to auscultation bilaterally, equal chest rise and fall, no increased work of breathing  Abdomen: Soft mildly distended.  No focal tenderness.   No obvious suprapubic distention.  Musculoskeletal: Warm and well perfused, 1+ pitting edema bilaterally.  No focal tenderness.  No lesions.  2+ DP pulses.    Neuro: Alert, no focal deficits  Integumentary: No wounds or rashes      EKG/LABS  Results for orders placed or performed during the hospital encounter of 24   EKG (NOW)   Result Value Ref Range    Report       [facility] Emergency Dept.    Test Date:  2024-12-10  Pt Name:    BILLY COFFEY               Department: [facility]  MRN:        [id number]                      Room:       -ROOM 2  Gender:     Female                       Technician: [id number]  :        [id number]                   Requested By:ERNST HUGHES  Order #:    [id number]                    Reading MD: Ernst Hughes    Measurements  Intervals                                Axis  Rate:       68                           P:          62  KY:         169                          QRS:        -71  QRSD:       113                          T:          68  QT:         404  QTc:        430    Interpretive Statements  Sinus rhythm, rate of 68, left axis deviation, normal intervals, nonspecific  ST abnormalities are present.  There is significant ST depression in lead II,  artifact does limit interpretation.    When compared to prior, 2011, ST abnormalities are now  more pronounced.  Electronically Signed On 12-  04:20:12 PST by Ernst Hughes       I have independently interpreted this EKG  Labs Reviewed   CBC WITH DIFFERENTIAL - Abnormal; Notable for the following components:       Result Value    MCHC 31.8 (*)     Lymphocytes 16.10 (*)     Immature Granulocytes 2.00 (*)     Lymphs (Absolute) 0.95 (*)     Immature Granulocytes  (abs) 0.12 (*)     All other components within normal limits   COMP METABOLIC PANEL - Abnormal; Notable for the following components:    Glucose 111 (*)     Bun 27 (*)     All other components within normal limits   TROPONIN - Abnormal; Notable for the following components:    Troponin T 26 (*)     All other components within normal limits   PROBRAIN NATRIURETIC PEPTIDE, NT - Abnormal; Notable for the following components:    NT-proBNP 361 (*)     All other components within normal limits   TROPONIN - Abnormal; Notable for the following components:    Troponin T 23 (*)     All other components within normal limits   URINALYSIS   URINE DRUG SCREEN   TSH WITH REFLEX TO FT4   ETHYL ALCOHOL (BLOOD)   ESTIMATED GFR   COV-2, FLU A/B, AND RSV BY PCR (CEPHEID)   CORTISOL       RADIOLOGY/PROCEDURES     Radiologist interpretation:  [id number] VIEW   Final Result         1.  Moderate stool in the colon suggests changes of constipation, otherwise nonspecific bowel gas pattern in the upper abdomen   2.  Left lower lobe infiltrates      CT-HEAD W/O   Final Result         1.  No acute intracranial abnormality is identified, there are nonspecific white matter changes, commonly associated with small vessel ischemic disease.  Associated mild cerebral atrophy is noted.   2.  Atherosclerosis.                   COURSE & MEDICAL DECISION MAKING    ASSESSMENT, COURSE AND PLAN  Care Narrative: This pleasant 89-year-old female is presenting with worsening confusion, per daughter, was found to have feces on her feet and confused.  She is A and O x 2 with a known history of dementia at baseline, on no prescription medications she does live independently however the daughter checks in on her frequently.  Vital signs are notable for documented hypothermia, as well as hypertension.    Exam is nonfocal she does have a slightly distended abdomen.  She is ambulatory with a moderately steady gait.  She does appear cachectic and malnourished.She  has bilateral peripheral edema.    I did witness her coughing frequently while trying to drink thin liquids.      From a workup perspective, her CT head is negative, her abdomen x-ray is concerning for potential constipation, and a left lobe infiltrate which may be mild interstitial edema or may be secondary to chronic aspiration.    TSH is at a therapeutic level, she does have mild elevation of her troponin, which is not uptrending, and mildly elevated proBNP.    Her urinalysis is not suggestive of infection.    I suspect patient has delirium in the setting of significant dementia.  This may be due to constipation or litany of other medical issues given her age.  I am concerned about her wellbeing at home, given that she is quite forgetful, is forgetting to eat, having difficulty swallowing, and defecating on the floor without realizing it, walking through it.    I discussed admission for patient's worsening confusion, with focus on potentially finding a better long-term care plan, including potentially admission to a nursing facility.     initially family and patient were reticent regarding this, however after some thought, they are now amenable.  Thus patient will be admitted for further workup as warranted and evaluation for safe outpatient disposition.    I discussed the case with Dr. Zhao who is agreeable to admission      DISPOSITION AND DISCUSSIONS  I have discussed management of the patient with the following physicians and SANJAY's:       FINAL DIAGNOSIS  1. Dementia, unspecified dementia severity, unspecified dementia type, unspecified whether behavioral, psychotic, or mood disturbance or anxiety (HCC)    2. Altered mental status, unspecified altered mental status type    3. Cerebral atrophy (HCC)         Electronically signed by: Ernst Schneider M.D., 12/9/2024 11:23 PM

## 2024-12-10 NOTE — PROGRESS NOTES
4 Eyes Skin Assessment Completed by BEATA Valenzuela and BEATA Greene.    Head WDL  Ears WDL  Nose WDL  Mouth WDL  Neck WDL  Breast/Chest WDL  Shoulder Blades WDL  Spine WDL  (R) Arm/Elbow/Hand WDL  (L) Arm/Elbow/Hand WDL  Abdomen WDL  Groin WDL  Scrotum/Coccyx/Buttocks Redness and Blanching    (R) Leg WDL  (L) Leg WDL  (R) Heel/Foot/Toe WDL callous  (L) Heel/Foot/Toe WDL callous      Devices In Places   Interventions In Place N/A  Possible Skin Injury Yes  Pictures Uploaded Into Epic Yes  Wound Consult Placed No  RN Wound Prevention Protocol Ordered Yes

## 2024-12-11 ENCOUNTER — HOME HEALTH ADMISSION (OUTPATIENT)
Dept: HOME HEALTH SERVICES | Facility: HOME HEALTHCARE | Age: 89
End: 2024-12-11
Payer: MEDICARE

## 2024-12-11 PROBLEM — E44.0 MODERATE PROTEIN MALNUTRITION (HCC): Status: ACTIVE | Noted: 2024-12-11

## 2024-12-11 PROBLEM — E87.1 HYPONATREMIA: Status: ACTIVE | Noted: 2024-12-11

## 2024-12-11 LAB
ANION GAP SERPL CALC-SCNC: 9 MMOL/L (ref 7–16)
BUN SERPL-MCNC: 22 MG/DL (ref 8–22)
CALCIUM SERPL-MCNC: 9.3 MG/DL (ref 8.4–10.2)
CHLORIDE SERPL-SCNC: 96 MMOL/L (ref 96–112)
CO2 SERPL-SCNC: 28 MMOL/L (ref 20–33)
CREAT SERPL-MCNC: 0.72 MG/DL (ref 0.5–1.4)
GFR SERPLBLD CREATININE-BSD FMLA CKD-EPI: 80 ML/MIN/1.73 M 2
GLUCOSE SERPL-MCNC: 86 MG/DL (ref 65–99)
MAGNESIUM SERPL-MCNC: 2 MG/DL (ref 1.5–2.5)
PHOSPHATE SERPL-MCNC: 3.7 MG/DL (ref 2.5–4.5)
POTASSIUM SERPL-SCNC: 4 MMOL/L (ref 3.6–5.5)
SODIUM SERPL-SCNC: 133 MMOL/L (ref 135–145)

## 2024-12-11 PROCEDURE — 80048 BASIC METABOLIC PNL TOTAL CA: CPT

## 2024-12-11 PROCEDURE — 97162 PT EVAL MOD COMPLEX 30 MIN: CPT

## 2024-12-11 PROCEDURE — G0378 HOSPITAL OBSERVATION PER HR: HCPCS

## 2024-12-11 PROCEDURE — 99232 SBSQ HOSP IP/OBS MODERATE 35: CPT | Performed by: INTERNAL MEDICINE

## 2024-12-11 PROCEDURE — A9270 NON-COVERED ITEM OR SERVICE: HCPCS | Performed by: INTERNAL MEDICINE

## 2024-12-11 PROCEDURE — 84100 ASSAY OF PHOSPHORUS: CPT

## 2024-12-11 PROCEDURE — 700102 HCHG RX REV CODE 250 W/ 637 OVERRIDE(OP): Performed by: INTERNAL MEDICINE

## 2024-12-11 PROCEDURE — 36415 COLL VENOUS BLD VENIPUNCTURE: CPT

## 2024-12-11 PROCEDURE — 83735 ASSAY OF MAGNESIUM: CPT

## 2024-12-11 PROCEDURE — 94760 N-INVAS EAR/PLS OXIMETRY 1: CPT

## 2024-12-11 RX ADMIN — POLYETHYLENE GLYCOL 3350 1 PACKET: 17 POWDER, FOR SOLUTION ORAL at 11:24

## 2024-12-11 RX ADMIN — RIVAROXABAN 10 MG: 10 TABLET, FILM COATED ORAL at 17:51

## 2024-12-11 ASSESSMENT — COGNITIVE AND FUNCTIONAL STATUS - GENERAL
SUGGESTED CMS G CODE MODIFIER MOBILITY: CK
TURNING FROM BACK TO SIDE WHILE IN FLAT BAD: A LITTLE
MOBILITY SCORE: 17
MOVING FROM LYING ON BACK TO SITTING ON SIDE OF FLAT BED: A LITTLE
MOVING TO AND FROM BED TO CHAIR: A LITTLE
WALKING IN HOSPITAL ROOM: A LITTLE
CLIMB 3 TO 5 STEPS WITH RAILING: A LOT
STANDING UP FROM CHAIR USING ARMS: A LITTLE

## 2024-12-11 ASSESSMENT — GAIT ASSESSMENTS
GAIT LEVEL OF ASSIST: MINIMAL ASSIST
ASSISTIVE DEVICE: FRONT WHEEL WALKER
DISTANCE (FEET): 20

## 2024-12-11 ASSESSMENT — PAIN SCALES - PAIN ASSESSMENT IN ADVANCED DEMENTIA (PAINAD)
FACIALEXPRESSION: SMILING OR INEXPRESSIVE
CONSOLABILITY: NO NEED TO CONSOLE
BODYLANGUAGE: RELAXED
BREATHING: NORMAL
TOTALSCORE: 0

## 2024-12-11 NOTE — DISCHARGE PLANNING
ATTN: Case Management  RE: Referral for Home Health    Reason for referral denial: Therapies indicate he needs 24 hour supervision and he has only a few hours of asst from family and otherwise lives alone. Patient requires higher level of care.              Unfortunately, we are not able to accept this referral for the reason listed above. If further clarity is needed, our Transitional Care Specialists are available to discuss any barriers to service at [phone].    We look forward to collaborating with you in the future,  [facility] Team

## 2024-12-11 NOTE — DISCHARGE PLANNING
"Case Management Discharge Planning    Admission Date: 12/9/2024  GMLOS:    ALOS: 0    6-Clicks ADL Score: 19  6-Clicks Mobility Score: 17  PT and/or OT Eval ordered: Yes  PT/OT:Recommending post acute placement   Post-acute Referrals Ordered: Yes  Post-acute Choice Obtained: Yes  Has referral(s) been sent to post-acute provider:  NA      Anticipated Discharge Dispo: Discharge Disposition: D/T to home under HHA care in anticipation of covered skilled care (06)    DME Needed: Pending hospital course     Action(s) Taken: LMSW attempted to call pt's \"Daughter Fartun\" on emergency contacts. LMSW attempted twice and was sent straight to . On  it stated that that \"DI is elba's caregiver and works graveyard hours. To please keep her appointments late\". LMSW left  to gather assessment questions and verify if she is pt's daughter or just a caregiver.     LMSW reviewed pt's chart. PT/OT recommending PA placement. Pt is currently observation status and would not qualify for SNF placement.  order received. Pt is insurance by [facility] HH referral [facility] to [facility].     1235 LMSW attempted to call pt's daughter again and was sent straight to . Monty chart Cristy  has accepted. Though PT/OT saw pt and recommending post acute placement. PMR consult placed due to pt being on observation status.    1500 LMSW spoke with daughter who is her caregiver to conduct assessment and notified her of HH acceptance, PT/OT recs of PA, and OBS status and pt may only be considered for IPR. Daughter stated she understands and that she would like a call from MD. LMSW notified MD and MD stated that IPR will be considered because pt is not safe to go home.             "

## 2024-12-11 NOTE — PROGRESS NOTES
Received report from nightshift RN and assumed care of patient at 0700. Patient denies needs at this time. Call light in reach. Bed in lowest locked position. Bed alarm on. Hourly rounding to continue.

## 2024-12-11 NOTE — THERAPY
"Occupational Therapy   Initial Evaluation     Patient Name: Denae Rivas  Age:  89 y.o., Sex:  female  Medical Record #: [id number]  Today's Date: 12/10/2024     Precautions  Precautions: Fall Risk    Assessment  Patient is 89 y.o. female who presented 12/9/2024 with worsening confusion. Acute metabolic encephalopathy, Hypothermia, dementia.  Unclear PLOF, family not present to confirm and pt is poor historian.  During OT eval, pt limited by generalized weakness, decreased activity tolerance, impaired balance and cogni tive impairment impacting ADL's and ADL transfers.  Pt will benefit from OT services while in house. Pt may benefit from further therapy in post acute setting vs home with HH an 24 H supervision.  Pt's current cogntive status may require more direct supervison for long term.    Plan    Occupational Therapy Initial Treatment Plan   Treatment Interventions: Self Care / Activities of Daily Living, Adaptive Equipment, Neuro Re-Education / Balance, Therapeutic Exercises, Therapeutic Activity, Family / Caregiver Training  Treatment Frequency: 3 Times per Week  Duration: Until Therapy Goals Met    DC Equipment Recommendations: Unable to determine at this time  Discharge Recommendations: Recommend post-acute placement for additional occupational therapy services prior to discharge home (vs home with HH and 24 H Supervision.  Pt's current cogntive status may require more direct supervison for long term.)     Subjective    \"They said I was dehydrated.  That's not good.\"     Objective       12/10/24 1515   Prior Living Situation   Prior Services Intermittent Physical Support for ADL Per Family   Comments Per EMR, pt has been living alone but dtr is with her 4-8 hours per day, helps with meals and IADl's.  Dtr not present during time of eval and pt is poor historian   Prior Level of ADL Function   Self Feeding Independent   Grooming / Hygiene Independent   Bathing Requires Assist   Dressing Unable To " Addended by: NICKIE SCHULTZ on: 11/30/2022 09:43 AM     Modules accepted: Orders     "Determine At This Time   Toileting Independent   Comments Info gathered from ER notes   Prior Level of IADL Function   Medication Management Requires Assist   Laundry Requires Assist   Kitchen Mobility Requires Assist   Finances Requires Assist   Home Management Requires Assist   Shopping Requires Assist   Prior Level Of Mobility Unable to Determine At This Time  (Per notes, pt was ambulatory but unclear if she was using a device)   Driving / Transportation Relatives / Others Provide Transportation   Occupation (Pre-Hospital Vocational) Retired Due To Age   Precautions   Precautions Fall Risk   Vitals   Pulse Oximetry 93 %   O2 Delivery Device None - Room Air   Pain 0 - 10 Group   Therapist Pain Assessment 0;During Activity;Nurse Notified   Cognition    Cognition / Consciousness X   Orientation Level Not Oriented to Reason;Not Oriented to Year   Level of Consciousness Alert   Safety Awareness Impaired   New Learning Impaired   Comments Pt pleasantly confused, forgetful.  Oriented to hospital, but not which one.  When asked if she knew the name of the hospital she said, \"Well I'm not sure but if Samy had a choice he would come here.\" When asked about year, pt stated, \"we are about to go into a new year...  It think it's '54 going into '55.  Pt was not able to provide any info on her PLOF or home setup   Active ROM Upper Body   Active ROM Upper Body  WDL   Dominant Hand Right   Strength Upper Body   Upper Body Strength  X   Gross Strength Generalized Weakness, Equal Bilaterally.    Coordination Upper Body   Comments grossly WFL   Balance Assessment   Sitting Balance (Static) Good   Sitting Balance (Dynamic) Fair +   Standing Balance (Static) Fair   Standing Balance (Dynamic) Fair -   Weight Shift Sitting Good   Weight Shift Standing Fair   Comments CGA (HHA) without device, close SBA with FWW.  Pt demos poor safety awareness with FWW, and does not appear familiar with using one regularly   Bed Mobility    Supine to " Sit Modified Independent   Sit to Supine Modified Independent   ADL Assessment   Grooming Standby Assist;Standing  (hand hygiene)   Toileting Contact Guard Assist   How much help from another person does the patient currently need...   Putting on and taking off regular lower body clothing? 3   Bathing (including washing, rinsing, and drying)? 3   Toileting, which includes using a toilet, bedpan, or urinal? 3   Putting on and taking off regular upper body clothing? 3   Taking care of personal grooming such as brushing teeth? 3   Eating meals? 4   6 Clicks Daily Activity Score 19   Functional Mobility   Sit to Stand Contact Guard Assist   Bed, Chair, Wheelchair Transfer Contact Guard Assist   Toilet Transfers Contact Guard Assist   Transfer Method Stand Step   Mobility CGA with and without FWW   Distance (Feet) 80   # of Times Distance was Traveled 1   Comments notable scoliosis of spine   Visual Perception   Comments appears intact   Activity Tolerance   Sitting Edge of Bed 2 min x2   Standing 4-5 min   Short Term Goals   Short Term Goal # 1 Pt will dress sup in 3 visits   Short Term Goal # 2 Pt will toilet sup in 3 visits   Short Term Goal # 3 pt will groom sup in 4 visits   Education Group   Education Provided Role of Occupational Therapist   Role of Occupational Therapist Patient Response Patient;Acceptance;Explanation;Reinforcement Needed

## 2024-12-11 NOTE — DIETARY
Nutrition Services: Initial Assessment     Day of admit. Denae Rivas is 89 y.o., female with admitting DX of Acute metabolic encephalopathy [G93.41].    Consult Received for poor oral intake consult, MST score 2 for unplanned wt loss 2-13 lb x 3 months, poor PO intake PTA    Current Hospital Problems List:      Acute metabolic encephalopathy (POA: Yes)    Thrombocytopenia (HCC) (POA: Yes)    Dementia (HCC) (POA: Yes)    Cerebral atrophy (HCC) (POA: Yes)    Hypothermia (POA: Yes)    Demand ischemia (HCC) (POA: Unknown)    Hyponatremia (POA: Unknown)    Nutrition Assessment:      Height: 152.4 cm (5')  Weight: 46.3 kg (102 lb 1.2 oz)  Weight taken via: Bed Scale  BMI Calculated: 19.93  BMI Classification: WNL - below ideal BMI for age    Weight Readings from Last 5 encounters:   Wt Readings from Last 6 Encounters:   12/10/24 46.3 kg (102 lb 1.2 oz)   09/19/24 43.4 kg (95 lb 11.2 oz)   08/29/24 44 kg (97 lb)   06/25/24 44.3 kg (97 lb 10.6 oz)   01/30/24 46.2 kg (101 lb 13.6 oz)   08/10/23 45.4 kg (100 lb)     Objective:   Pertinent Medical Hx: HTN. Presented w/ worsening confusion, AMS x2 days. Forgetful, A&O x2 this a.m. per flowsheets  Pertinent Labs: Na 133. K+, mag, phos WNL  Pertinent Meds: pericolace  Skin/Wounds:  sacrum - no wound team notes or consults  Food Allergies: NKFA  Last BM:      (PTA)     Current Diet Order/Intake:   IDDSI Level 6 Soft / Bite sized, Level 0 - thin liquids: intake per ADLs - not yet documented    Subjective:   Pt w/ limited ability as a historian; forgetful, A&O x2 per flowsheets. She was able to state poor appetite, unsure how long this has been going on. Pt expressed dislike when offered cottage cheese, yogurt, and Ensure supplements. Sometimes has whole milk or ice cream. Pt may enjoy magic cup supplements.    Nutrition Focused Physical Exam (NFPE)  Weight Loss: Wt up 5 lb in the past 3.5 months per chart review; no significant wt losses in the past >1 year per EMR.  Muscle  Mass: severe loss at temporalis; moderate loss at clavicle, deltoid regions  Subcutaneous Fat: moderate fat wasting in orbital region; mild loss in triceps  Fluid Accumulation: none documented  Reduced  Strength: N/A in acute care setting.    Nutrition Diagnosis:      Moderate Malnutrition in context of Chronic Illness suspect related to dementia as evidenced by pt presents w/ moderate and severe muscle wasting, moderate fat wasting.  RD notified provider Dr ELIAZAR Casillas via Voalte message.    Nutrition Interventions:      Provide whole milk with breakfasts; BID magic cups w/ lunch and dinner.  Patient aware of active plan of care as appropriate.     Nutrition Monitoring and Evaluation:      Monitor nutrition POC, goal for >/=50% intake from meals and supplements.  Additional fluids per MD/DO  Monitor vital signs pertinent to nutrition.    RD following and will provide updated recommendations as indicated.      ELIEZER Lomeli/AND CRITERIA FOR MALNUTRITION

## 2024-12-11 NOTE — THERAPY
Physical Therapy   Initial Evaluation     Patient Name: Denae Rivas  Age:  89 y.o., Sex:  female  Medical Record #: [id number]  Today's Date: 12/11/2024     Precautions  Precautions: Fall Risk    Assessment  Patient is 89 y.o. female with a diagnosis of acute toxic metabolic encephalopathy.  Pt is presenting with confusion and impaired strength and balance. Pt is not safe to be living alone given the above and since is not at baseline for mobility, recommend continued PT at SNF prior to DC home.      Plan    Physical Therapy Initial Treatment Plan   Treatment Plan : Bed Mobility, Gait Training, Family / Caregiver Training, Neuro Re-Education / Balance, Therapeutic Activities, Therapeutic Exercise, Stair Training, Self Care / Home Evaluation  Treatment Frequency: 3 Times per Week  Duration: Until Therapy Goals Met    DC Equipment Recommendations: Unable to determine at this time  Discharge Recommendations: Recommend post-acute placement for additional physical therapy services prior to discharge home        12/11/24 0948   Prior Living Situation   Housing / Facility 2 Story House   Lives with - Patient's Self Care Capacity Alone and Unable to Care For Self   Comments Pts dtr has been assisting pt on a daily basis, recent falls   Prior Level of Functional Mobility   Bed Mobility Independent   Transfer Status Independent   Ambulation Independent   Comments unable to determine if pt was using an assistive device   History of Falls   History of Falls Yes   Cognition    Comments Pt is oriented to hospital and Walt but otherwise could not provide any info re: current living environment, why she was in the hospita, or PLOF. Pt noted to have long delay when responding to questions, most responses were not in context with question, pt very confused   Active ROM Lower Body    Active ROM Lower Body  WDL   Strength Lower Body   Lower Body Strength  X   Gross Strength Generalized Weakness, Equal Bilaterally   Balance  Assessment   Sitting Balance (Static) Fair +   Sitting Balance (Dynamic) Fair   Standing Balance (Static) Fair -   Standing Balance (Dynamic) Poor +   Weight Shift Sitting Fair   Weight Shift Standing Poor   Comments stdg with HHA vs FWW   Bed Mobility    Supine to Sit Minimal Assist   Gait Analysis   Gait Level Of Assist Minimal Assist   Assistive Device Front Wheel Walker   Distance (Feet) 20   # of Times Distance was Traveled 2   Comments Omega HHA x 20 ft   Functional Mobility   Sit to Stand Minimal Assist   Bed, Chair, Wheelchair Transfer Minimal Assist   Activity Tolerance   Standing 2-3 min   Short Term Goals    Short Term Goal # 1 Pt will be able to perform bed mobility and sup <> sit Krysten in 6 visits.   Short Term Goal # 2 Pt will be able to perform sit <> stand and transfer Krysten in 6 visits.   Short Term Goal # 3 Pt will be able to ambulate 100 ft with FWW Krysten in 6 visits.   Short Term Goal # 4 Pt will be able to go up/down flight of steps Krysten in 6 visits.

## 2024-12-11 NOTE — CARE PLAN
The patient is Stable - Low risk of patient condition declining or worsening    Shift Goals  Clinical Goals: Patient will remain free from falls  Patient Goals: Rest    Progress made toward(s) clinical / shift goals:  Patient oriented to unit. Educated on the use of call light. Bed in lowest, locked position. Bed and chair alarm on. Personal belongings in reach.     Problem: Fall Risk  Goal: Patient will remain free from falls  Outcome: Progressing     Patient is not progressing towards the following goals:

## 2024-12-11 NOTE — PROGRESS NOTES
Hospital Medicine Daily Progress Note    Date of Service  12/11/2024    Chief Complaint  Denae Rivas is a 89 y.o. female admitted 12/9/2024 with AMS    Hospital Course  88 yo with mild cognitive impairement admitted for acute toxic metabolic encephalopathy of unclear etiology now at baseline.    Interval Problem Update  - doing well does report memory problems  - answers questions appropriately  - CM unable to reach daughter  - PT rec'd SNF  - pt constipated, I asked RN to give miralax    I have discussed this patient's plan of care and discharge plan at IDT rounds today with Case Management, Nursing, Nursing leadership, and other members of the IDT team.    Code Status  DNAR/DNI    Disposition  The patient is medically cleared for discharge to home or a post-acute facility.  Anticipate discharge to: skilled nursing facility    I have placed the appropriate orders for post-discharge needs.    Review of Systems  Review of Systems   All other systems reviewed and are negative.       Physical Exam  Temp:  [36.3 °C (97.4 °F)-36.6 °C (97.9 °F)] 36.6 °C (97.9 °F)  Pulse:  [62-74] 65  Resp:  [16-18] 18  BP: (126-158)/(64-75) 126/75  SpO2:  [90 %-94 %] 93 %    Physical Exam  General: NAD, resting comfortably, thin  HEENT: PERRLA, EOMI  Cards: RRR, no murmur or gallops, no tenderness of chest wall, JVP nl  Pulm: normal respiratory effort, CTAB, no wheezes or rhonchi  Abdomen: soft, NTND, + bowel sounds, no rebound tenderness or guarding  MSK: normal ROM of upper and lower extremities  Neuro: CN II-XII grossly intact, sensation/strength intact, AAOx3  Psych: Appropriate mood   Fluids    Intake/Output Summary (Last 24 hours) at 12/11/2024 1459  Last data filed at 12/11/2024 1122  Gross per 24 hour   Intake --   Output 1700 ml   Net -1700 ml        Laboratory  Recent Labs     12/10/24  0000   WBC 5.9   RBC 5.01   HEMOGLOBIN 14.5   HEMATOCRIT 45.6   MCV 91.0   MCH 28.9   MCHC 31.8*   RDW 47.3   PLATELETCT 189   MPV 10.1      Recent Labs     12/10/24  0000 12/11/24  0257   SODIUM 137 133*   POTASSIUM 3.8 4.0   CHLORIDE 98 96   CO2 28 28   GLUCOSE 111* 86   BUN 27* 22   CREATININE 0.83 0.72   CALCIUM 9.7 9.3                   Imaging  EC-ECHOCARDIOGRAM COMPLETE W/O CONT   Final Result      DX-CHEST-PORTABLE (1 VIEW)   Final Result      No evidence of acute cardiopulmonary process.      [id number] VIEW   Final Result         1.  Moderate stool in the colon suggests changes of constipation, otherwise nonspecific bowel gas pattern in the upper abdomen   2.  Left lower lobe infiltrates      CT-HEAD W/O   Final Result         1.  No acute intracranial abnormality is identified, there are nonspecific white matter changes, commonly associated with small vessel ischemic disease.  Associated mild cerebral atrophy is noted.   2.  Atherosclerosis.                    Assessment/Plan  * Acute metabolic encephalopathy- (present on admission)  Assessment & Plan  Unclear cause at this time  No obvious evidence of infection  Her UA is normal  TSH/B12 unremarkable  CXR clear  Echo relatively unremarkable  She's improved off abx  Does have dementia and has some degree of waxing and waning delirium  PT/OT has recommended SNF  Unable to reach daughter    Moderate protein malnutrition (HCC)  Assessment & Plan  POA  Dietician consulted  Add ensure TID    Hyponatremia  Assessment & Plan  Noted likely due to poor PO intake  Diet advanced    Demand ischemia (HCC)  Assessment & Plan  Noted  Echo with nl LVEF  No SwmAs  Pt wo cardiopulmonary complaints    Hypothermia- (present on admission)  Assessment & Plan  TSH OK    Cerebral atrophy (HCC)- (present on admission)  Assessment & Plan  Noted, has dementia    Dementia (HCC)- (present on admission)  Assessment & Plan  With acute worsening  No obvious cause  Will need safer environment    Thrombocytopenia (HCC)- (present on admission)  Assessment & Plan  Mild, stable, no bleeding         VTE prophylaxis:  xarelto    I have performed a physical exam and reviewed and updated ROS and Plan today (12/11/2024). In review of yesterday's note (12/10/2024), there are no changes except as documented above.    I spent 40 minutes providing care for this patient.  This included face-to-face interview, physical examination.  Review of lab work including CBC, BMPn. Discussion with multidisciplinary team including case management, nursing staff and pharmacy

## 2024-12-11 NOTE — DISCHARGE PLANNING
"Care Transition Team Assessment    Daughter Julien [phone]    LMSW spoke with pt's daughter Julien who is also her DPOA and states is her \"guardian\". Pt is AOX2 but per chart pt's BL is AOX3. Pt was admitted on 12/9/24 for Acute metabolic encephalopathy. All the following information gather for CM assessment was provided by daughter Julien.     Pt's PCP is Kemi Neal.    Daughter verified address on file. Pt lives alone in a townhouse with multiple stories. Per daughter pt dis not have trouble managing the steps up to the second floor where her bedroom and bathroom are.     Daughter mentioned that she visits pt 2-3 times a day but does not live with here. Daughter stated that she works graveyard hours and is hard to get a hold of during the day.     Daughter mentioned that she was mostly independent with ADLS with the use of her walker and WC. Daughter states she helps pt with IADLS. Daughter mentioned that she helps with notes/reminders around the house for pt.     Pt's insurance is [facility].      Information Source  Orientation Level: Oriented to place, Oriented to person, Disoriented to time, Disoriented to situation  Information Given By: Caregiver, Other (Comments) (Daughter)  Informant's Name: JULIEN COFFEY  Who is responsible for making decisions for patient? : POA  Name(s) of Primary Decision Maker: JULIEN COFFEY    Readmission Evaluation  Is this a readmission?: No    Elopement Risk  Legal Hold: No  Ambulatory or Self Mobile in Wheelchair: Yes  Disoriented: Time-At Risk for Elopement, Situation-At Risk for Elopement  Psychiatric Symptoms: None  History of Wandering: No  Elopement this Admit: No  Vocalizing Wanting to Leave: No  Displays Behaviors, Body Language Wanting to Leave: No-Not at Risk for Elopement  Elopement Risk: Not at Risk for Elopement  Personal Belongings: Hospital Clothing Only    Interdisciplinary Discharge Planning  Primary Care Physician: KEMI NEAL D.O  Lives with - Patient's Self " Care Capacity: Alone and Unable to Care For Self  Patient or legal guardian wants to designate a caregiver: No  Support Systems: Family Member(s)  Housing / Facility: 2 Story House  Name of Care Facility: none  Prior Services: Intermittent Physical Support for ADL Per Family    Discharge Preparedness  What is your plan after discharge?: Uncertain - pending medical team collaboration  What are your discharge supports?: Child  Prior Functional Level: Ambulatory, Independent with Activities of Daily Living, Uses Walker, Uses Wheelchair  Difficulity with ADLs: Walking  Difficulity with IADLs: Cooking, Driving, Laundry, Managing medication, Using the telephone or computer, Keeping track of finances, Shopping    Functional Assesment  Prior Functional Level: Ambulatory, Independent with Activities of Daily Living, Uses Walker, Uses Wheelchair    Finances  Financial Barriers to Discharge: No  Prescription Coverage: Yes    Vision / Hearing Impairment  Vision Impairment : Yes  Right Eye Vision: Impaired, Wears Glasses  Left Eye Vision: Impaired, Wears Glasses  Hearing Impairment : No    Advance Directive  Advance Directive?: DPOA for Health Care    Domestic Abuse  Have you ever been the victim of abuse or violence?: No  Possible Abuse/Neglect Reported to:: Not Applicable    Psychological Assessment  History of Substance Abuse: None  History of Psychiatric Problems: No  Non-compliant with Treatment: No  Newly Diagnosed Illness: No    Discharge Risks or Barriers  Discharge risks or barriers?: No    Anticipated Discharge Information  Discharge Disposition: D/T to home under A care in anticipation of covered skilled care (06)  Discharge Address: [address] [place] [address]  DANIELLE TABOR [id number]  Discharge Contact Phone Number: [phone]

## 2024-12-11 NOTE — CARE PLAN
The patient is Stable - Low risk of patient condition declining or worsening    Shift Goals  Clinical Goals: Safety; Monitor vitals  Patient Goals: Rest    Progress made toward(s) clinical / shift goals:    Problem: Knowledge Deficit - Standard  Goal: Patient and family/care givers will demonstrate understanding of plan of care, disease process/condition, diagnostic tests and medications  Outcome: Progressing     Problem: Hemodynamics  Goal: Patient's hemodynamics, fluid balance and neurologic status will be stable or improve  Outcome: Progressing     Problem: Urinary Elimination  Goal: Establish and maintain regular urinary output  Outcome: Progressing       Patient is not progressing towards the following goals:

## 2024-12-11 NOTE — CARE PLAN
The patient is Stable - Low risk of patient condition declining or worsening    Shift Goals  Clinical Goals: pt safety, monitor labs and vitals, mobilize  Patient Goals: comfort    Progress made toward(s) clinical / shift goals:    Problem: Psychosocial  Goal: Patient's level of anxiety will decrease  Outcome: Progressing     Problem: Communication  Goal: The ability to communicate needs accurately and effectively will improve  Outcome: Progressing     Problem: Discharge Barriers/Planning  Goal: Patient's continuum of care needs are met  Outcome: Progressing     Problem: Mobility  Goal: Patient's capacity to carry out activities will improve  Outcome: Progressing     Problem: Self Care  Goal: Patient will have the ability to perform ADLs independently or with assistance (bathe, groom, dress, toilet and feed)  Outcome: Progressing       Patient is not progressing towards the following goals:

## 2024-12-12 LAB
ANION GAP SERPL CALC-SCNC: 9 MMOL/L (ref 7–16)
BUN SERPL-MCNC: 27 MG/DL (ref 8–22)
CALCIUM SERPL-MCNC: 9.3 MG/DL (ref 8.4–10.2)
CHLORIDE SERPL-SCNC: 100 MMOL/L (ref 96–112)
CO2 SERPL-SCNC: 29 MMOL/L (ref 20–33)
CREAT SERPL-MCNC: 0.84 MG/DL (ref 0.5–1.4)
GFR SERPLBLD CREATININE-BSD FMLA CKD-EPI: 67 ML/MIN/1.73 M 2
GLUCOSE SERPL-MCNC: 91 MG/DL (ref 65–99)
POTASSIUM SERPL-SCNC: 4.1 MMOL/L (ref 3.6–5.5)
SODIUM SERPL-SCNC: 138 MMOL/L (ref 135–145)

## 2024-12-12 PROCEDURE — G0378 HOSPITAL OBSERVATION PER HR: HCPCS

## 2024-12-12 PROCEDURE — 92523 SPEECH SOUND LANG COMPREHEN: CPT

## 2024-12-12 PROCEDURE — 700102 HCHG RX REV CODE 250 W/ 637 OVERRIDE(OP): Performed by: INTERNAL MEDICINE

## 2024-12-12 PROCEDURE — 99232 SBSQ HOSP IP/OBS MODERATE 35: CPT | Performed by: INTERNAL MEDICINE

## 2024-12-12 PROCEDURE — A9270 NON-COVERED ITEM OR SERVICE: HCPCS | Performed by: INTERNAL MEDICINE

## 2024-12-12 PROCEDURE — 92610 EVALUATE SWALLOWING FUNCTION: CPT

## 2024-12-12 PROCEDURE — 36415 COLL VENOUS BLD VENIPUNCTURE: CPT

## 2024-12-12 PROCEDURE — 97112 NEUROMUSCULAR REEDUCATION: CPT

## 2024-12-12 PROCEDURE — 80048 BASIC METABOLIC PNL TOTAL CA: CPT

## 2024-12-12 PROCEDURE — 94760 N-INVAS EAR/PLS OXIMETRY 1: CPT

## 2024-12-12 PROCEDURE — 97535 SELF CARE MNGMENT TRAINING: CPT

## 2024-12-12 RX ADMIN — SENNOSIDES AND DOCUSATE SODIUM 2 TABLET: 50; 8.6 TABLET ORAL at 19:41

## 2024-12-12 RX ADMIN — RIVAROXABAN 10 MG: 10 TABLET, FILM COATED ORAL at 19:41

## 2024-12-12 ASSESSMENT — PAIN SCALES - PAIN ASSESSMENT IN ADVANCED DEMENTIA (PAINAD)
TOTALSCORE: 0
CONSOLABILITY: NO NEED TO CONSOLE
BREATHING: NORMAL
BODYLANGUAGE: RELAXED
BODYLANGUAGE: RELAXED
FACIALEXPRESSION: SMILING OR INEXPRESSIVE
BREATHING: NORMAL
TOTALSCORE: 0
FACIALEXPRESSION: SMILING OR INEXPRESSIVE
CONSOLABILITY: NO NEED TO CONSOLE

## 2024-12-12 ASSESSMENT — PAIN DESCRIPTION - PAIN TYPE
TYPE: ACUTE PAIN
TYPE: ACUTE PAIN

## 2024-12-12 ASSESSMENT — FIBROSIS 4 INDEX: FIB4 SCORE: 2.77

## 2024-12-12 ASSESSMENT — COGNITIVE AND FUNCTIONAL STATUS - GENERAL
TOILETING: A LITTLE
PERSONAL GROOMING: A LITTLE
EATING MEALS: A LITTLE
DAILY ACTIVITIY SCORE: 18
SUGGESTED CMS G CODE MODIFIER DAILY ACTIVITY: CK
HELP NEEDED FOR BATHING: A LITTLE
DRESSING REGULAR UPPER BODY CLOTHING: A LITTLE
DRESSING REGULAR LOWER BODY CLOTHING: A LITTLE

## 2024-12-12 ASSESSMENT — GAIT ASSESSMENTS: DISTANCE (FEET): 120

## 2024-12-12 NOTE — PROGRESS NOTES
Upon assessment of patient, she is oriented to self only, impulsive, and attempting to exit her bed without clear reasoning. Tele sitter camera initiated.

## 2024-12-12 NOTE — DISCHARGE PLANNING
Case Management Discharge Planning    Admission Date: 12/9/2024  GMLOS:    ALOS: 0    6-Clicks ADL Score: 19  6-Clicks Mobility Score: 17  PT and/or OT Eval ordered: Yes  Post-acute Referrals Ordered: Yes  Post-acute Choice Obtained: No  Has referral(s) been sent to post-acute provider:  Yes      Anticipated Discharge Dispo: Discharge Disposition: D/T to SNF with Medicare cert in anticipation of skilled care (03)  Discharge Address: [address] [place] [id number]  Discharge Contact Phone Number: [phone]    DME Needed: No    Action(s) Taken:     SNF order placed and referrals sent to local SNFs per protocol based on PT/OT recommendations. RNCM called pt's daughter Fartun to discuss option to DC pt to SNF. No answer, left VM.    Received call back from Fartun. Fartun agreeable to SNF placement. Choice form completed for 1)[facility]) [facility] and [facility]ed to [facility].     Spoke to Gladys from [place]. Gladys requested RNCM call tomorrow for bed availability once pt has been off tele sitter for 24 hours.    Escalations Completed: None    Medically Clear: Yes    Next Steps: Follow-up with pt's daughter Fartun regarding SNF choice and DC plan.    Barriers to Discharge: Pending Placement, Pt needs to be off tele sitter for 24 hrs if DC to SNF.

## 2024-12-12 NOTE — THERAPY
Speech Language Pathology   Clinical Swallow Evaluation     Patient Name: Denae Rivas  AGE:  89 y.o., SEX:  female  Medical Record #: [id number]  Date of Service: 12/12/2024      History of Present Illness  Pt is an 89 y/o female admitted 12/10 with ALOC and covered in her feces. Found to have acute metabolic encephalopathy and possible dementia.   PMHx: HTN, shingles. No previous SLP notes found in EMR.     Head CT:   1.  No acute intracranial abnormality is identified, there are nonspecific white matter changes, commonly associated with small vessel ischemic disease.  Associated mild cerebral atrophy is noted.  2.  Atherosclerosis.    CXR: negative.     General Information:  Vitals  O2 Delivery Device: None - Room Air  Level of Consciousness: Alert, Awake  Patient Behaviors: Confused  Follows Directives: Inconsistent    Prior Living Situation & Level of Function:  Prior Services: Intermittent Physical Support for ADL Per Family  Lives with - Patient's Self Care Capacity: Alone and Unable to Care For Self  Communication: Unknown; possible previous mild cognitive deficit  Swallowing: Unknown    Oral Mechanism Evaluation:  Dentition: Good, Natural dentition   Facial Symmetry: Equal, Pt did not follow commands to assess  Facial Sensation: Not tested     Labial Observations: WFL, Pt did not follow commands to assess   Lingual Observations: Midline, Pt did not follow commands to assess  Motor Speech: WFL    Laryngeal Function:  Secretion Management: Adequate  Voice Quality: Hoarse  Max Phonation Time (seconds): 4  Cough: Perceptually weak     Subjective  RN cleared patient for CSE. Per RN, SLP was thought to be on board d/t IDDSI diet ordered, but SLP was only consulted for cognitive evaluation yesterday. Per RN, patient appears to be tolerating diet of SB6/TN0 without overt difficulty.    Assessment  Current Method of Nutrition: Oral diet (SB6/TN0 (soft/bite-sized solids w/ thin liquids))  Positioning: Costello's  (60-90 degrees)  Bolus Administration: SLP, Patient  O2 Delivery Device: None - Room Air  Factor(s) Affecting Performance: Impaired mental status, Impaired command following    Swallowing Trials:  Swallowing Trials  Thin Liquid (TN0): WFL  Liquidised (LQ3): WFL  Soft & Bite Sized (SB6): WFL    Comments: Patient unable to complete formal oral motor evaluation d/t confusion and cognitive impairment. Upon inspection, no gross deficits noted with the exception of hoarse vocal quality, but patient unable to give adequate history on timeline of same.    Patient required encouragement for PO intake, but was agreeable when offered. Patient required 1:1 feeding during evaluation, but CNA reports patient does feed herself when prompted. Patient consumed PO trials of thins via cup sip and straw, liquidized purees, and soft solids. Patient demonstrated appropriate bolus acceptance with the exception of straw sips of thin liquids. Patient unable to consistently secure straw sips which appeared related to impaired cognition rather than labial weakness. Mastication was mildly delayed with complete oral clearance achieved. Mild fatigue noted with mastication. No upgraded solids were trialed d/t same. No s/sx of aspiration noted on any textures trialed.     Clinical Impressions  Patient presents with grossly functional oropharyngeal swallow to continue modified diet of SB6/TN0 (soft/bite-sized solids w/ thin liquids) w/ feeding assistance as needed. Small straw sips okay as tolerated. Meds okay as tolerated. SLP to follow for diet tolerance and can complete instrumental swallow study with any concerns.     Recommendations  Diet Consistency: SB6/TN0 (soft/bite-sized solids w/ thin liquids)  Instrumentation: Instrumental swallow study pending clinical progress  Medication: As tolerated  Supervision: Assist with meal tray set up, Close supervision - patient may be left alone for less than 5 minutes at a time (Assist with feeding if  "needed)  Positioning: Fully upright and midline during oral intake, Meals sitting upright in a chair, as tolerated  Risk Management : Small bites/sips, Slow rate of intake, Reduce environmental distractions, Physical mobility, as tolerated  Oral Care: Q4h     SLP Treatment Plan  Treatment Plan: Dysphagia Treatment  SLP Frequency: 3x Per Week  Estimated Duration: Until Therapy Goals Met    Anticipated Discharge Needs  Discharge Recommendations: Anticipate that the patient will have no further speech therapy needs after discharge from the hospital   Therapy Recommendations Upon DC: Dysphagia Training, Community Re-Integration, Patient / Family / Caregiver Education      Patient / Family Goals  Patient / Family Goal #1: \"Orange juice\"  Short Term Goals  Short Term Goal # 1: Patient will consume SB6/TN0 diet with assistance as needed with no overt s/sx of aspiration or decline in respiratory status.    Bijal Wilson, SLP   "

## 2024-12-12 NOTE — DISCHARGE PLANNING
Case reviewed with Dr. Bee Medical director for IRF with [facility]. Current documentation does not support IRF level of care do not anticipate a reasonable and practical improvement to be able to return home with limited support and supervision. No physiatry consult per protocol.

## 2024-12-12 NOTE — THERAPY
Speech Language Pathology   Cognitive Evaluation     Patient Name: Denae Rivas  AGE:  89 y.o., SEX:  female  Medical Record #: [id number]  Date of Service: 2024      History of Present Illness  Pt is an 89 y/o female admitted 12/10 with ALOC and covered in her feces. Found to have acute metabolic encephalopathy and possible dementia.   PMHx: HTN, shingles. No previous SLP notes found in EMR.      Head CT:   1.  No acute intracranial abnormality is identified, there are nonspecific white matter changes, commonly associated with small vessel ischemic disease.  Associated mild cerebral atrophy is noted.  2.  Atherosclerosis.     CXR: negative.     General Information  Vitals  O2 Delivery Device: None - Room Air  Level of Consciousness: Alert, Awake  Patient Behaviors: Confused  Orientation: Self, , General place  Follows Directives: Inconsistent    Prior Living Situation & Level of Function  Prior Services: Intermittent Physical Support for ADL Per Family  Lives with - Patient's Self Care Capacity: Alone and Unable to Care For Self  Education: High School Graduate or GED  Communication: Unknown; Possible previous mild cognitive deficit  Swallowing: Unknown     Oral Mechanism Evaluation  Dentition: Good, Natural dentition  Facial Symmetry: Equal, Pt did not follow commands to assess  Facial Sensation: Not tested  Labial Observations: WFL, Pt did not follow commands to assess  Lingual Observations: Midline, Pt did not follow commands to assess  Motor Speech: WFL    Laryngeal Function Exam  Secretion Management: Adequate  Voice Quality: Hoarse  Max Phonation Time (seconds): 4  Cough: Perceptually weak    Subjective  RN also cleared patient for cognitive evaluation. Patient awake, alert, and with noted confusion by RN and per PT/OT notes. Patient with possible hx of dementia, but is suspected to be worsening.    Communication Domain(s)  Expressive Language: Moderate  Receptive Language:  Mild  Cognitive-Linguistic: Severe  Reading:  (Unable to complete)   Social/Pragmatic: Moderate    Assessment  The patient was seen this date for a cognitive-linguistic evaluation. Patient awake, alert, and confused, but pleasant and cooperative. Patient presents with delayed and intermittently absent responses during conversation. Patient was administered the Cognistat with a combination of other non-standard assessments. Patient was oriented to self, , and general location with confusion to all other spheres. Patient also presented with moderate deficits in attention, short-term memory, auditory math calculations, and judgment. Severe deficits were noted in similarities/reasoning. Patient was unable to complete reading, writing, and medication management task. She was able to recall street portion of address when asked.     Cognistat  Orientation: Severe  Attention: Moderate  Comprehension: Mild  Repetition: Average  Naming: Mild  Memory: Moderate  Calculations: Moderate  Similarities: Severe  Judgement: Moderate    Clinical Impressions  Patient presents with moderate-severe cognitive-linguistic deficits that appear consistent with recent baseline; possible progression of dementia. Patient would benefit from direct assistance with ADLs and IADLs upon d/c to ensure safety and sound decision-making and will need placement, as she no longer appears safe to live alone d/t aforementioned cognitive deficits. SLP will not actively follow for cognitive therapy, but is following for dysphagia.     NOTE: It is not within the scope of practice of Speech-Language Pathologists to determine patient capacity. Please defer to the physician or psych to complete this assessment.     Recommendations  Supervision Needs Upons Discharge: Intermittent supervision throughout the day and direct assistance with IADLs (see below)  IADLs: Medication management, Financial management, Appointment management, Household chores, Cooking    SLP  "Treatment Plan  Treatment Plan: Dysphagia Treatment (Not following for cognition)  SLP Frequency: 3x Per Week  Estimated Duration: Until Therapy Goals Met    Anticipated Discharge Needs  Discharge Recommendations: Anticipate that the patient will have no further speech therapy needs after discharge from the hospital  Therapy Recommendations Upon DC: Dysphagia Training, Community Re-Integration, Patient / Family / Caregiver Education    Patient / Family Goals  Patient / Family Goal #1: \"Orange juice\"  Short Term Goal # 1: Patient will consume SB6/TN0 diet with assistance as needed with no overt s/sx of aspiration or decline in respiratory status.    Bijal Wilson, SLP  "

## 2024-12-12 NOTE — PROGRESS NOTES
Hospital Medicine Daily Progress Note    Date of Service  12/12/2024    Chief Complaint  Denae Rivas is a 89 y.o. female admitted 12/9/2024 with AMS    Hospital Course  89 yo with mild cognitive impairement admitted for acute toxic metabolic encephalopathy of unclear etiology now at baseline.    Interval Problem Update  - doing well no complaints  - per CM given insurance can go to SNF  - referrals sent  - tele nely moss'd    I have discussed this patient's plan of care and discharge plan at IDT rounds today with Case Management, Nursing, Nursing leadership, and other members of the IDT team.    Code Status  DNAR/DNI    Disposition  The patient is medically cleared for discharge to home or a post-acute facility.  Anticipate discharge to: skilled nursing facility    I have placed the appropriate orders for post-discharge needs.    Review of Systems  Review of Systems   All other systems reviewed and are negative.       Physical Exam  Temp:  [36.5 °C (97.7 °F)-36.9 °C (98.5 °F)] 36.7 °C (98 °F)  Pulse:  [67-78] 74  Resp:  [17-18] 17  BP: (156-171)/(84-90) 157/84  SpO2:  [90 %-92 %] 92 %    Physical Exam  General: NAD, resting comfortably, thin  HEENT: PERRLA, EOMI  Cards: RRR, no murmur or gallops, no tenderness of chest wall, JVP nl  Pulm: normal respiratory effort, CTAB, no wheezes or rhonchi  Abdomen: soft, NTND, + bowel sounds, no rebound tenderness or guarding  MSK: normal ROM of upper and lower extremities  Neuro: CN II-XII grossly intact, sensation/strength intact, AAOx3  Psych: Appropriate mood   Fluids  No intake or output data in the 24 hours ending 12/12/24 1332       Laboratory  Recent Labs     12/10/24  0000   WBC 5.9   RBC 5.01   HEMOGLOBIN 14.5   HEMATOCRIT 45.6   MCV 91.0   MCH 28.9   MCHC 31.8*   RDW 47.3   PLATELETCT 189   MPV 10.1     Recent Labs     12/10/24  0000 12/11/24  0257 12/12/24  0117   SODIUM 137 133* 138   POTASSIUM 3.8 4.0 4.1   CHLORIDE 98 96 100   CO2 28 28 29   GLUCOSE 111*  86 91   BUN 27* 22 27*   CREATININE 0.83 0.72 0.84   CALCIUM 9.7 9.3 9.3                   Imaging  EC-ECHOCARDIOGRAM COMPLETE W/O CONT   Final Result      DX-CHEST-PORTABLE (1 VIEW)   Final Result      No evidence of acute cardiopulmonary process.      [id number] VIEW   Final Result         1.  Moderate stool in the colon suggests changes of constipation, otherwise nonspecific bowel gas pattern in the upper abdomen   2.  Left lower lobe infiltrates      CT-HEAD W/O   Final Result         1.  No acute intracranial abnormality is identified, there are nonspecific white matter changes, commonly associated with small vessel ischemic disease.  Associated mild cerebral atrophy is noted.   2.  Atherosclerosis.                    Assessment/Plan  * Acute metabolic encephalopathy- (present on admission)  Assessment & Plan  Unclear cause at this time  No obvious evidence of infection  Her UA is normal  TSH/B12 unremarkable  CXR clear  Echo relatively unremarkable  She's improved off abx  Does have dementia and has some degree of waxing and waning delirium  PT/OT has recommended SNF  Updated daughter at length over the phone regarding concerns of going home, and likely need for rehab which she was agreeable. In the meantime she will opt for HH after rehab and likely move in to live with her.    Moderate protein malnutrition (HCC)  Assessment & Plan  POA  Dietician consulted  Add ensure TID    Hyponatremia  Assessment & Plan  Noted likely due to poor PO intake  Diet advanced    Demand ischemia (HCC)  Assessment & Plan  Noted  Echo with nl LVEF  No SwmAs  Pt wo cardiopulmonary complaints    Hypothermia- (present on admission)  Assessment & Plan  TSH OK    Cerebral atrophy (HCC)- (present on admission)  Assessment & Plan  Noted, has dementia    Dementia (HCC)- (present on admission)  Assessment & Plan  With acute worsening  No obvious cause  Will need safer environment  Ordered for SLP cognitive assessment  IPR evaluating to  see if appropriate for them  SNF referral sent  Tele sitter ordered last night for impulsiveness, dc'd this AM    Thrombocytopenia (HCC)- (present on admission)  Assessment & Plan  Mild, stable, no bleeding         VTE prophylaxis: xarelto    I have performed a physical exam and reviewed and updated ROS and Plan today (12/12/2024). In review of yesterday's note (12/11/2024), there are no changes except as documented above.    I spent 40 minutes providing care for this patient.  This included face-to-face interview, physical examination.  Review of lab work including CBC, BMPn. Discussion with multidisciplinary team including case management, nursing staff and pharmacy

## 2024-12-12 NOTE — PROGRESS NOTES
Pt arrived to the unit. VSS on RA. Denies pain.    Strip bed alarm set, call light in reach.

## 2024-12-12 NOTE — PROGRESS NOTES
0700 Received report from Night shift nurse  0917 Performed assessment  Pt is A&Ox4, no complaints of pain and no overt s/s of pain, Updated on POC: mobilize, manage pain, D/C to SNF/rehab  Call light within reach, hourly rounding in place, bed in lowest position.    0958: tele sitter D/C'd

## 2024-12-12 NOTE — THERAPY
"Occupational Therapy  Daily Treatment     Patient Name: Denae Rivas  Age:  89 y.o., Sex:  female  Medical Record #: [id number]  Today's Date: 12/12/2024     Precautions  Precautions: Fall Risk, Swallow Precautions    Assessment    Pt's activity tolerance appears to be improving.  Increased delay in motor planning and verbal response time compared to previous session which was later in the day.  Suspect alertness may fluxuate during the day.  Pt required consistent cuing to initiate and complete ADL tasks. At this time does not appear safe for home alone and would benefit from further inpt post acute therapy.  OT will follow while in house.      Plan    Treatment Plan Status: Continue Current Treatment Plan  Type of Treatment: Self Care / Activities of Daily Living, Adaptive Equipment, Neuro Re-Education / Balance, Therapeutic Exercises, Therapeutic Activity, Family / Caregiver Training  Treatment Frequency: 3 Times per Week  Treatment Duration: Until Therapy Goals Met    DC Equipment Recommendations: Unable to determine at this time  Discharge Recommendations: Recommend post-acute placement for additional occupational therapy services prior to discharge home    Subjective    \"That window needs to be washed.\"     Objective       12/12/24 1235   Pain 0 - 10 Group   Therapist Pain Assessment 0;During Activity;Nurse Notified   Cognition    Cognition / Consciousness X   Speech/ Communication Delayed Responses   Orientation Level   (oriented to self, and \"[facility]\")   Level of Consciousness Responds to voice   Safety Awareness Impaired   New Learning Impaired   Comments Pt agreeable to therapy and OOB for ADl's.  Significant delay in verbal responses and slowed motor movement this am compared to when she was seen by OT later in the day previously.  Suspect she may be more active in the evenings.  She was pleasant and cooperative.  Needed cues to initiate activities (grooming, feeding)   Active ROM Upper Body   Active " ROM Upper Body  WDL   Strength Upper Body   Gross Strength Generalized Weakness, Equal Bilaterally.    Other Treatments   Other Treatments Provided worked on static and dynamic balance in standing with ADL's and mobility, and well as increasing activity tolerance for OOB   Balance   Sitting Balance (Static) Fair +   Sitting Balance (Dynamic) Fair   Standing Balance (Static) Fair -   Standing Balance (Dynamic) Poor +   Weight Shift Sitting Fair   Weight Shift Standing Fair   Skilled Intervention Verbal Cuing;Tactile Cuing;Postural Facilitation   Comments mildly shuffled gait, was hesitant walking with FWW, better pace and stride length with HHA   Bed Mobility    Supine to Sit Supervised  (with HOB elevated, use of rail)   Scooting Standby Assist   Skilled Intervention Verbal Cuing   Activities of Daily Living   Eating Supervision  (and setup- needed assist with containers)   Grooming Standby Assist  (brushing hair, slowed movement, required prolonged time)   Skilled Intervention Verbal Cuing   How much help from another person does the patient currently need...   Putting on and taking off regular lower body clothing? 3   Bathing (including washing, rinsing, and drying)? 3   Toileting, which includes using a toilet, bedpan, or urinal? 3   Putting on and taking off regular upper body clothing? 3   Taking care of personal grooming such as brushing teeth? 3   Eating meals? 3   6 Clicks Daily Activity Score 18   Functional Mobility   Sit to Stand Contact Guard Assist   Bed, Chair, Wheelchair Transfer Contact Guard Assist   Transfer Method Stand Step   Mobility CGA with and without FWW   Distance (Feet) 120   # of Times Distance was Traveled 1   Skilled Intervention Verbal Cuing;Tactile Cuing;Postural Facilitation;Sequencing   Comments pt with difficulty sequencing using the walker, better pace with HHA   Activity Tolerance   Sitting in Chair > 15 min   Sitting Edge of Bed 5 min   Standing 10 min at sink, 4-5 min walking    Short Term Goals   Short Term Goal # 1 Pt will dress sup in 3 visits   Goal Outcome # 1 Progressing slower than expected   Short Term Goal # 2 Pt will toilet sup in 3 visits   Goal Outcome # 2 Progressing slower than expected   Short Term Goal # 3 pt will groom sup in 4 visits   Goal Outcome # 3 Progressing as expected   Education Group   Education Provided Activities of Daily Living   ADL Patient Response Patient;Acceptance;Explanation;Reinforcement Needed

## 2024-12-12 NOTE — CARE PLAN
The patient is Stable - Low risk of patient condition declining or worsening    Shift Goals  Clinical Goals: remain free from falls, manage pain at or above 3/10 with medication per MAR, mobilize up to chair/hallway as tolerated  Patient Goals: rest, manage pain, mobilize  Family Goals: N/A    Progress made toward(s) clinical / shift goals:  pt remained free from falls, no complaints of pain; no overt s/s of pain, worked with SLP and PT, mobilized up to chair and hallway as tolerated    Problem: Knowledge Deficit - Standard  Goal: Patient and family/care givers will demonstrate understanding of plan of care, disease process/condition, diagnostic tests and medications  Outcome: Progressing     Problem: Communication  Goal: The ability to communicate needs accurately and effectively will improve  Outcome: Progressing     Problem: Discharge Barriers/Planning  Goal: Patient's continuum of care needs are met  Outcome: Progressing     Problem: Urinary Elimination  Goal: Establish and maintain regular urinary output  Outcome: Progressing     Problem: Bowel Elimination  Goal: Establish and maintain regular bowel function  Outcome: Progressing     Problem: Mobility  Goal: Patient's capacity to carry out activities will improve  Outcome: Progressing     Problem: Self Care  Goal: Patient will have the ability to perform ADLs independently or with assistance (bathe, groom, dress, toilet and feed)  Outcome: Progressing     Problem: Fall Risk  Goal: Patient will remain free from falls  Outcome: Progressing       Patient is not progressing towards the following goals:

## 2024-12-12 NOTE — CARE PLAN
The patient is Stable - Low risk of patient condition declining or worsening    Shift Goals  Clinical Goals: safety  Patient Goals: rest    Progress made toward(s) clinical / shift goals:  Tele sitter camera in use for patient safety. Care clustered to promote sleep/rest.    Patient is not progressing towards the following goals:      Problem: Knowledge Deficit - Standard  Goal: Patient and family/care givers will demonstrate understanding of plan of care, disease process/condition, diagnostic tests and medications  Outcome: Not Progressing

## 2024-12-12 NOTE — DISCHARGE PLANNING
PM&R referral from Dr. Casillas. Metabolic encephalopathy. Chart reviewed Therapy notes, Labs, Attending note ,and Discharge planning jack. Observation status under SCP. Limited return to community support. May benefit from SLP cognitive evaluation. Consult pended at this time.

## 2024-12-13 PROCEDURE — 99232 SBSQ HOSP IP/OBS MODERATE 35: CPT | Performed by: INTERNAL MEDICINE

## 2024-12-13 PROCEDURE — A9270 NON-COVERED ITEM OR SERVICE: HCPCS | Performed by: INTERNAL MEDICINE

## 2024-12-13 PROCEDURE — 700102 HCHG RX REV CODE 250 W/ 637 OVERRIDE(OP): Performed by: INTERNAL MEDICINE

## 2024-12-13 PROCEDURE — 94760 N-INVAS EAR/PLS OXIMETRY 1: CPT

## 2024-12-13 RX ADMIN — SENNOSIDES AND DOCUSATE SODIUM 2 TABLET: 50; 8.6 TABLET ORAL at 18:12

## 2024-12-13 RX ADMIN — RIVAROXABAN 10 MG: 10 TABLET, FILM COATED ORAL at 18:12

## 2024-12-13 ASSESSMENT — PAIN SCALES - PAIN ASSESSMENT IN ADVANCED DEMENTIA (PAINAD)
BODYLANGUAGE: RELAXED
CONSOLABILITY: NO NEED TO CONSOLE
TOTALSCORE: 0
BREATHING: NORMAL
TOTALSCORE: 0
FACIALEXPRESSION: SMILING OR INEXPRESSIVE
FACIALEXPRESSION: SMILING OR INEXPRESSIVE
BODYLANGUAGE: RELAXED
CONSOLABILITY: NO NEED TO CONSOLE
BREATHING: NORMAL

## 2024-12-13 ASSESSMENT — PAIN DESCRIPTION - PAIN TYPE
TYPE: ACUTE PAIN
TYPE: ACUTE PAIN
TYPE: CHRONIC PAIN;ACUTE PAIN

## 2024-12-13 NOTE — DISCHARGE PLANNING
Per  SW, DPA placed call to [facility] and spoke with Huey VALVERDE and she stated she would re look at Pt, but wouldn't have any female beds until 12/16/24. Informed  SW. BURT was asked to call [facility]. Tania had questions and call was transferred to .

## 2024-12-13 NOTE — PROGRESS NOTES
Assumed care of pt. Pt remains A&Ox1. Family at beside. Pt medicated with evening medications and RN reviewed POC with family. All questions answered. Pt denies any pain or n/v at this time. High fall precautions verified.

## 2024-12-13 NOTE — PROGRESS NOTES
1819: Pt arrived to unit. Strip alarm in place, eleuterio light within reach. Pt A&Ox1     1905: Bedside Report Given to Raymond COTA

## 2024-12-13 NOTE — DISCHARGE PLANNING
Case Management Discharge Planning    Admission Date: 12/9/2024  GMLOS:    ALOS: 0    6-Clicks ADL Score: 18  6-Clicks Mobility Score: 17  PT and/or OT Eval ordered: Yes  Post-acute Referrals Ordered: Yes  Post-acute Choice Obtained: Yes  Has referral(s) been sent to post-acute provider:  Yes      Anticipated Discharge Dispo: Discharge Disposition: D/T to SNF with Medicare cert in anticipation of skilled care (03)  Discharge Address: [address] [place] [address]  [place] [address]  Discharge Contact Phone Number: [phone]    DME Needed: No    Action(s) Taken: Updated Provider/Nurse on Discharge Plan and Referral(s) sent    Pt discussed in 0815 rounds. Pt is medically cleared for SNF, has been off the tele sitter and will be 24 hours later today.     Call placed to [facility] with Keiko. Gladys reported they have declined pt due to pt having no skilled need, reported pt is supervision and standby assist with therapy. LSW acknowledged.     [facility] and [facility] are pending. Requested for BURT to call [facility] as they are marked considering for tele sitter.   Per DPA, [facility] would not have a female bed open until Monday. Requested for DPA to call [facility].   Received transferred call from Tania with [facility]. Tania reported they are concerned about a safe discharge plan due to the conditions pt was found in, with feces all over the room. LSW reported pt's daughter has reported she is pt's caregiver. Tania expressed concern regarding situation, stated referral will be declined unless a long term plan is in place. LSW acknowledged.     Requested for BURT to expand referrals to [facility] in the event [facility] declines as well. Requested for DPA to call [facility] to see if pt is accepted.     1433-  Spoke with MD regarding pt. BURT has been calling [facility] and has not heard back with a determination. MD believes pt is stable to discharge home with , and has spoken to daughter. MD requested LSW to contact  daughter and follow up.     LSW spoke with pt's daughter via phone. Fartun is agreeable to pt discharging home with HH, previously set up with [facility].   Discussed options of memory care for long term placement and in home caregivers to assist her in taking care of pt.   Resources emailed to Fartun.     4460-  Received call from Fartun, she will be picking up pt tonight around 2100 due to working [employer].   RN notified.     Escalations Completed: None    Medically Clear: Yes    Next Steps: LSW to follow    Barriers to Discharge: Pending Placement    Is the patient up for discharge tomorrow: No

## 2024-12-13 NOTE — DISCHARGE PLANNING
Per WOO PINEDA, BURT placed call to inquire about considering status for [facility]. Requested return call from Huey VALVERDE

## 2024-12-13 NOTE — CARE PLAN
The patient is Stable - Low risk of patient condition declining or worsening    Shift Goals  Clinical Goals: pt will not sustain a fall this shift.  Patient Goals: sleep comfortable  Family Goals: N/A    Progress made toward(s) clinical / shift goals:      Pt placed on high fall precautions. No falls this shift.    Problem: Knowledge Deficit - Standard  Goal: Patient and family/care givers will demonstrate understanding of plan of care, disease process/condition, diagnostic tests and medications  Outcome: Progressing     Problem: Mobility  Goal: Patient's capacity to carry out activities will improve  Outcome: Progressing     Problem: Self Care  Goal: Patient will have the ability to perform ADLs independently or with assistance (bathe, groom, dress, toilet and feed)  Outcome: Progressing     Problem: Fall Risk  Goal: Patient will remain free from falls  Outcome: Progressing       Patient is not progressing towards the following goals:

## 2024-12-13 NOTE — DIETARY
Nutrition Update: Follow-up for PO intake   Day 0 of admit.  Denae Rivas is a 89 y.o. female with admitting DX of Acute metabolic encephalopathy [G93.41].    Current Diet: Level 6-soft and bite sized  w/ Level 0 - thin liquids. Whole milk with breakfast and Magic Cup with lunch and dinner. Limited documentation of PO intake. Pt ate 25-50% of her breakfast today.     Moderate Malnutrition in context of Chronic Illness suspect related to dementia as evidenced by pt presents w/ moderate and severe muscle wasting, moderate fat wasting.  RD notified provider Dr ELIAZAR Casillas via Voalte message    Nutrition Dx Status: Ongoing    Problem: Nutritional:  Goal: Achieve haylee quate nutritional intake  Description: Patient will consume >/=50% of meals  Outcome: progressing slower than expected.     RD following.

## 2024-12-13 NOTE — DISCHARGE PLANNING
Per CM EDWIN, DPA expanded blanket SNF referrals to [place]/[facility] locations.

## 2024-12-13 NOTE — PROGRESS NOTES
4 Eyes Skin Assessment Completed by Tavares RN and Raymond RN.    Head WDL  Ears WDL  Nose WDL  Mouth WDL  Neck WDL  Breast/Chest WDL  Shoulder Blades Redness and Blanching  Spine Redness and Blanching  (R) Arm/Elbow/Hand Bruising  (L) Arm/Elbow/Hand Bruising and Scab  Abdomen WDL  Groin Redness and Blanching  Scrotum/Coccyx/Buttocks Redness and Blanching  (R) Leg WDL  (L) Leg WDL  (R) Heel/Foot/Toe Boggy  (L) Heel/Foot/Toe Boggy          Devices In Places Blood Pressure Cuff and Pulse Ox      Interventions In Place Sacral Mepilex and Pressure Redistribution Mattress    Possible Skin Injury No    Pictures Uploaded Into Epic N/A  Wound Consult Placed N/A  RN Wound Prevention Protocol Ordered No

## 2024-12-13 NOTE — DISCHARGE SUMMARY
Hospital Medicine Discharge Note     Admit Date:  12/9/2024       Discharge Date:   12/13/2024  LOS: 0 days     Primary Care Provider:    Herman Neal D.O.    Attending Physician:  Sharon Casillas M.D.     Discharge Diagnoses:   Principal Problem:    Acute metabolic encephalopathy  Active Problems:    Thrombocytopenia (HCC)    Dementia (HCC)    Cerebral atrophy (HCC)    Hypothermia    Demand ischemia (HCC)    Hyponatremia    Moderate protein malnutrition (HCC)        Hospital Summary (Brief Narrative):         89 yo with mild cognitive impairement admitted for acute toxic metabolic encephalopathy of unclear etiology now at baseline. Infectious etiology was ruled out. TSH nl, B12 level high.    SLP performed cognitive evaluation and noted moderate-severe cognitive-linguistic deficits that appear consistent with recent baseline; possible progression of dementia. Patient would benefit from direct assistance with ADLs and IADLs upon d/c to ensure safety and sound decision-making as she no longer appears safe to live alone d/t aforementioned cognitive deficits.     SNFs declined patient including IPR. We discussed this with daughter who plans to likely move in with her. In addition, she will likely her part time care giver to be with patient when she is at work. She reported no other family members that can help her.      Disposition:   Discharge home w     Condition:  Stable    Activity:   As tolerated     Diet:   Regular    Discharge Medications:           Medication List        START taking these medications        Instructions   polyethylene glycol/lytes Pack  Commonly known as: Miralax   Take 1 Packet by mouth every day.  Dose: 17 g            CONTINUE taking these medications        Instructions   acyclovir 400 MG tablet  Commonly known as: Zovirax   Take 400 mg by mouth 3 times a day as needed.  Dose: 400 mg     KELP PO   Take 1 Capsule by mouth 1 time a day as needed.  Dose: 1 Capsule     * NUTRITIONAL  "SUPPLEMENT PO   Take  by mouth. Shark Liver Oil - 2 capsules two times daily     * NUTRITIONAL SUPPLEMENT PO   Take 2 Capsules by mouth in the morning, at noon, and at bedtime. \"Bone Up\" (calcium, vitamin D, vitamin K, magnesium)  Dose: 2 Capsule     * NUTRITIONAL SUPPLEMENT PO   Take 1 Capsule by mouth every day. [place] Oil  Dose: 1 Capsule     vitamin D3 125 MCG (5000 UT) Caps   Take 1 Capsule by mouth every day.  Dose: 1 Capsule           * This list has 3 medication(s) that are the same as other medications prescribed for you. Read the directions carefully, and ask your doctor or other care provider to review them with you.                    Follow up appointment details :      I encouraged her to call his PCP to confirm follow up after discharge.    Future Appointments   Date Time Provider Department Center   12/31/2024  4:00 PM Nickolas Bradshaw M.D. [facility] None         Consultants:      None    Studies:    Imaging/ Testing:      EC-ECHOCARDIOGRAM COMPLETE W/O CONT   Final Result      DX-CHEST-PORTABLE (1 VIEW)   Final Result      No evidence of acute cardiopulmonary process.      [id number] VIEW   Final Result         1.  Moderate stool in the colon suggests changes of constipation, otherwise nonspecific bowel gas pattern in the upper abdomen   2.  Left lower lobe infiltrates      CT-HEAD W/O   Final Result         1.  No acute intracranial abnormality is identified, there are nonspecific white matter changes, commonly associated with small vessel ischemic disease.  Associated mild cerebral atrophy is noted.   2.  Atherosclerosis.                   Procedures:        None      Instructions:      The were given instructions to return to the ER if patient's condition worsens      Time Spent on Discharge:     Discharge instructions were discussed with the patient at bedside. Patient  expressed understanding and agreed to comply with all discharge instructions.    37 minutes were spent in the discharge planning and " management of this  patient, including more than 50% of the time spent face to face in   Counseling.

## 2024-12-14 VITALS
HEART RATE: 64 BPM | BODY MASS INDEX: 20.08 KG/M2 | DIASTOLIC BLOOD PRESSURE: 73 MMHG | WEIGHT: 102.29 LBS | TEMPERATURE: 97 F | SYSTOLIC BLOOD PRESSURE: 148 MMHG | OXYGEN SATURATION: 91 % | HEIGHT: 60 IN | RESPIRATION RATE: 18 BRPM

## 2024-12-14 PROCEDURE — 99239 HOSP IP/OBS DSCHRG MGMT >30: CPT | Performed by: INTERNAL MEDICINE

## 2024-12-14 ASSESSMENT — PAIN DESCRIPTION - PAIN TYPE: TYPE: ACUTE PAIN

## 2024-12-14 ASSESSMENT — COGNITIVE AND FUNCTIONAL STATUS - GENERAL
MOVING TO AND FROM BED TO CHAIR: A LITTLE
SUGGESTED CMS G CODE MODIFIER MOBILITY: CJ
DRESSING REGULAR LOWER BODY CLOTHING: A LITTLE
MOBILITY SCORE: 21
HELP NEEDED FOR BATHING: A LITTLE
DRESSING REGULAR UPPER BODY CLOTHING: A LITTLE
DAILY ACTIVITIY SCORE: 21
CLIMB 3 TO 5 STEPS WITH RAILING: A LOT
SUGGESTED CMS G CODE MODIFIER DAILY ACTIVITY: CJ

## 2024-12-14 NOTE — CARE PLAN
The patient is Stable - Low risk of patient condition declining or worsening    Shift Goals  Clinical Goals: Monitor V/S. Pain management. Zero fall.  Patient Goals: Rest and sleep  Family Goals: N/A    Progress made toward(s) clinical / shift goals:  Pt V/S stable. No PRN pain medication given. High BP, stable v/s. Zero fall to Pt.Pt seen sleeping during the rounds.     Patient is not progressing towards the following goals:

## 2024-12-14 NOTE — PROGRESS NOTES
0745 - Bedside report received from BEATA Miguel. Patient resting with eyes closed, on RA in no acute respiratory distress. Daily plan of care discussed. Patient complains of no pain at this time. No other needs at this time. Call light and personal belongings within reach. Hourly rounding in place. Chart reviewed for recent labs, notes, and orders.

## 2024-12-14 NOTE — DISCHARGE SUMMARY
Hospital Medicine Discharge Note     Admit Date:  12/9/2024       Discharge Date:   12/14/2024  LOS: 5 days     Primary Care Provider:    Herman Neal D.O.    Attending Physician:  Sharon Casillas M.D.     Discharge Diagnoses:   Principal Problem:    Acute metabolic encephalopathy  Active Problems:    Thrombocytopenia (HCC)    Dementia (HCC)    Cerebral atrophy (HCC)    Hypothermia    Demand ischemia (HCC)    Hyponatremia    Moderate protein malnutrition (HCC)        Hospital Summary (Brief Narrative):         89 yo with mild cognitive impairement admitted for acute toxic metabolic encephalopathy of unclear etiology now at baseline. Infectious etiology was ruled out. TSH nl, B12 level high.     SLP performed cognitive evaluation and noted moderate-severe cognitive-linguistic deficits that appear consistent with recent baseline; possible progression of dementia. Patient would benefit from direct assistance with ADLs and IADLs upon d/c to ensure safety and sound decision-making as she no longer appears safe to live alone d/t aforementioned cognitive deficits.      SNFs declined patient including IPR. We discussed this with daughter who plans to likely move in with her. In addition, she will likely her part time care giver to be with patient when she is at work. She reported no other family members that can help her.    Of note, I do believe pt has moderate to severe dementia. Daughter has been given resources for private caregivers as well as memory care. Pt most likely would benefit from memory care placement which can only be done through her family in outpatient setting.      Disposition:   Discharge home w HH    Condition:  Stable    Activity:   As tolerated     Diet:   Heart Healthy Diet    Discharge Medications:           Medication List        START taking these medications        Instructions   polyethylene glycol/lytes Pack  Commonly known as: Miralax   Take 1 Packet by mouth every day.  Dose: 17 g    "         CONTINUE taking these medications        Instructions   acyclovir 400 MG tablet  Commonly known as: Zovirax   Take 400 mg by mouth 3 times a day as needed.  Dose: 400 mg     KELP PO   Take 1 Capsule by mouth 1 time a day as needed.  Dose: 1 Capsule     * NUTRITIONAL SUPPLEMENT PO   Take  by mouth. Shark Liver Oil - 2 capsules two times daily     * NUTRITIONAL SUPPLEMENT PO   Take 2 Capsules by mouth in the morning, at noon, and at bedtime. \"Bone Up\" (calcium, vitamin D, vitamin K, magnesium)  Dose: 2 Capsule     * NUTRITIONAL SUPPLEMENT PO   Take 1 Capsule by mouth every day. [place] Oil  Dose: 1 Capsule     vitamin D3 125 MCG (5000 UT) Caps   Take 1 Capsule by mouth every day.  Dose: 1 Capsule           * This list has 3 medication(s) that are the same as other medications prescribed for you. Read the directions carefully, and ask your doctor or other care provider to review them with you.                    Follow up appointment details :      I encouraged her to call his PCP to confirm follow up after discharge.    Future Appointments   Date Time Provider Department Center   12/31/2024  4:00 PM Nickolas Bradshaw M.D. [facility] None         Consultants:      None    Studies:    Imaging/ Testing:      EC-ECHOCARDIOGRAM COMPLETE W/O CONT   Final Result      DX-CHEST-PORTABLE (1 VIEW)   Final Result      No evidence of acute cardiopulmonary process.      [id number] VIEW   Final Result         1.  Moderate stool in the colon suggests changes of constipation, otherwise nonspecific bowel gas pattern in the upper abdomen   2.  Left lower lobe infiltrates      CT-HEAD W/O   Final Result         1.  No acute intracranial abnormality is identified, there are nonspecific white matter changes, commonly associated with small vessel ischemic disease.  Associated mild cerebral atrophy is noted.   2.  Atherosclerosis.                   Procedures:        None      Instructions:      The were given instructions to return to the " ER if patient's condition worsens      Time Spent on Discharge:     Discharge instructions were discussed with the patient at bedside. Patient  expressed understanding and agreed to comply with all discharge instructions.    37 minutes were spent in the discharge planning and management of this  patient, including more than 50% of the time spent face to face in   Counseling.

## 2024-12-14 NOTE — CARE PLAN
The patient is Stable - Low risk of patient condition declining or worsening    Shift Goals  Clinical Goals: monitor labs, pain, fall precautions, increase activity with comfort  Patient Goals: sleep comfortable  Family Goals: N/A    Progress made toward(s) clinical / shift goals:  in progress     Patient is not progressing towards the following goals:

## 2024-12-14 NOTE — PROGRESS NOTES
Hospital Medicine Daily Progress Note    Date of Service  12/14/2024    Chief Complaint  Denae Rivas is a 89 y.o. female admitted 12/9/2024 with AMS    Hospital Course  89 yo with mild cognitive impairement admitted for acute toxic metabolic encephalopathy of unclear etiology now at baseline.    Interval Problem Update  - doing well seems to know where she is unable to engage in full conversation  - waiting for daughter to pick her up    I have discussed this patient's plan of care and discharge plan at IDT rounds today with Case Management, Nursing, Nursing leadership, and other members of the IDT team.    Code Status  DNAR/DNI    Disposition  The patient is medically cleared for discharge to home or a post-acute facility.  Anticipate discharge to: home with organized home healthcare and close outpatient follow-up    I have placed the appropriate orders for post-discharge needs.    Review of Systems  Review of Systems   All other systems reviewed and are negative.       Physical Exam  Temp:  [35.9 °C (96.7 °F)-36.1 °C (97 °F)] 36.1 °C (97 °F)  Pulse:  [58-69] 64  Resp:  [18] 18  BP: (148-163)/(69-85) 148/73  SpO2:  [91 %-95 %] 91 %    Physical Exam  General: NAD, resting comfortably, thin  HEENT: PERRLA, EOMI  Cards: RRR, no murmur or gallops, no tenderness of chest wall, JVP nl  Pulm: normal respiratory effort, CTAB, no wheezes or rhonchi  Abdomen: soft, NTND, + bowel sounds, no rebound tenderness or guarding  MSK: normal ROM of upper and lower extremities  Neuro: CN II-XII grossly intact, sensation/strength intact, AAOx3  Psych: Appropriate mood   Fluids    Intake/Output Summary (Last 24 hours) at 12/14/2024 1408  Last data filed at 12/14/2024 1338  Gross per 24 hour   Intake 320 ml   Output --   Net 320 ml          Laboratory        Recent Labs     12/12/24  0117   SODIUM 138   POTASSIUM 4.1   CHLORIDE 100   CO2 29   GLUCOSE 91   BUN 27*   CREATININE 0.84   CALCIUM 9.3                    Imaging  EC-ECHOCARDIOGRAM COMPLETE W/O CONT   Final Result      DX-CHEST-PORTABLE (1 VIEW)   Final Result      No evidence of acute cardiopulmonary process.      [id number] VIEW   Final Result         1.  Moderate stool in the colon suggests changes of constipation, otherwise nonspecific bowel gas pattern in the upper abdomen   2.  Left lower lobe infiltrates      CT-HEAD W/O   Final Result         1.  No acute intracranial abnormality is identified, there are nonspecific white matter changes, commonly associated with small vessel ischemic disease.  Associated mild cerebral atrophy is noted.   2.  Atherosclerosis.                    Assessment/Plan  * Acute metabolic encephalopathy- (present on admission)  Assessment & Plan  Unclear cause at this time  No obvious evidence of infection  Her UA is normal  TSH/B12 unremarkable  CXR clear  Echo relatively unremarkable  She's improved off abx  Does have dementia and has some degree of waxing and waning delirium  PT/OT has recommended SNF  Updated daughter at length over the phone regarding concerns of going home, and likely need for rehab which she was agreeable. In the meantime she will opt for HH after rehab and likely move in to live with her.    Moderate protein malnutrition (HCC)  Assessment & Plan  POA  Dietician consulted  Add ensure TID    Hyponatremia  Assessment & Plan  Noted likely due to poor PO intake  Diet advanced    Demand ischemia (HCC)  Assessment & Plan  Noted  Echo with nl LVEF  No SwmAs  Pt wo cardiopulmonary complaints    Hypothermia- (present on admission)  Assessment & Plan  TSH OK    Cerebral atrophy (HCC)- (present on admission)  Assessment & Plan  Noted, has dementia    Dementia (HCC)- (present on admission)  Assessment & Plan  With acute worsening  No obvious cause  Will need safer environment  Ordered for SLP cognitive assessment  IPR evaluating to see if appropriate for them  SNF referral sent  Tele sitter ordered last night for  impulsiveness, dc'd this AM    Thrombocytopenia (HCC)- (present on admission)  Assessment & Plan  Mild, stable, no bleeding         VTE prophylaxis: continue xarelto for ppx    I have performed a physical exam and reviewed and updated ROS and Plan today (12/14/2024). In review of yesterday's note (12/13/2024), there are no changes except as documented above.    I spent  35  minutes providing care for this patient.  This included face-to-face interview, physical examination.  Review of lab work including CBC, BMPn. Discussion with multidisciplinary team including case management, nursing staff and pharmacy

## 2024-12-14 NOTE — PROGRESS NOTES
Charge Nurse Note:    2103: Pt's daughter Fartun called this unit over concerns for unsafe discharge per daughter. The pt will not have a bed at the place she is staying until tomorrow nor will they have a walker. Daughter is also uncertain of plan of care. Charge RN discussed plan of care extensively with the daughter, daughter refusing to sign discharge paperwork until questions are clarified with the physician. Notified Dr. Victoria of refusal to discharge.

## 2024-12-14 NOTE — PROGRESS NOTES
1908 Received report from day shift RN. Patient is awake and alert, resting in chair. A&O X1, room air. Unlabored respiration observed.  Care plan discussed including coming discharge. Call light within reach. Personal belongings within reach. No needs required at this time. Safety precautions in place.

## 2024-12-14 NOTE — DISCHARGE PLANNING
Case Management Discharge Planning    Admission Date: 12/9/2024  GMLOS:    ALOS: 0    6-Clicks ADL Score: 21  6-Clicks Mobility Score: 21      Anticipated Discharge Dispo: Discharge Disposition: D/T to SNF with Medicare cert in anticipation of skilled care (03)  Discharge Address: [address] [place] [address] [place] [address]  Discharge Contact Phone Number: [phone]    DME Needed: No    Action(s) Taken: Updated Provider/Nurse on Discharge Plan    Pt discussed in 0815 rounds. Per night shift RN notes, pt's daughter refused to take pt home last night after agreeing to pt discharging with HH yesterday. Pt's daughter is supposed to be picking pt up today at 1500.     Voicemail left for pt's daughter.     1410-  Received call from pt's daughter Fartun. Fartun reported she will be coming to  pt today at 1530.  RN notified.     Escalations Completed: None    Medically Clear: Yes    Next Steps: LSW to follow    Barriers to Discharge: None    Is the patient up for discharge tomorrow: No

## 2024-12-18 ENCOUNTER — TELEPHONE (OUTPATIENT)
Dept: NEUROLOGY | Facility: MEDICAL CENTER | Age: 89
End: 2024-12-18
Payer: MEDICARE

## 2024-12-18 NOTE — TELEPHONE ENCOUNTER
NEUROLOGY PATIENT PRE-VISIT PLANNING     Patient was NOT contacted to complete PVP.  Note: Patient will not be contacted if there is no indication to call.     Patient Appointment is scheduled as: New Patient     Is visit type and length scheduled correctly? Yes    [facility] Patient is checked in Patient Demographics? Yes    3.   Is referral attached to visit? Yes    4. Were records received from referring provider? Yes    4. Patient was contacted to have someone accompany them to visit. Called and left a message with the caregiver     5. Is this appointment scheduled as a Hospital Follow-Up?  No    6. Does the patient require any pre procedure or post procedure follow up? No    7. If any orders were placed at last visit or intended to be done for this visit do we have Results/Consult Notes? No  Labs - Labs were not ordered at last office visit.  Imaging - Imaging was not ordered at last office visit.  Referrals - No referrals were ordered at last office visit.  Note: If patient appointment is for lab or imaging review and patient did not complete the studies, check with provider if OK to reschedule patient until completed.    8. If patient appointment is for Botox - is order pended for provider? N/A    9. Was Plan Assessment from last Neurology Office Visit Reviewed?  No hamlet has not been seen in office

## 2024-12-31 ENCOUNTER — OFFICE VISIT (OUTPATIENT)
Dept: NEUROLOGY | Facility: MEDICAL CENTER | Age: 89
End: 2024-12-31
Attending: PSYCHIATRY & NEUROLOGY
Payer: MEDICARE

## 2024-12-31 VITALS
TEMPERATURE: 97.2 F | SYSTOLIC BLOOD PRESSURE: 156 MMHG | HEART RATE: 57 BPM | BODY MASS INDEX: 19.04 KG/M2 | HEIGHT: 60 IN | WEIGHT: 97 LBS | RESPIRATION RATE: 12 BRPM | OXYGEN SATURATION: 96 % | DIASTOLIC BLOOD PRESSURE: 74 MMHG

## 2024-12-31 DIAGNOSIS — R03.0 ELEVATED BLOOD PRESSURE READING: ICD-10-CM

## 2024-12-31 DIAGNOSIS — R26.81 UNSTEADY GAIT WHEN WALKING: ICD-10-CM

## 2024-12-31 DIAGNOSIS — G30.1 MODERATE LATE ONSET ALZHEIMER'S DEMENTIA WITHOUT BEHAVIORAL DISTURBANCE, PSYCHOTIC DISTURBANCE, MOOD DISTURBANCE, OR ANXIETY (HCC): Primary | ICD-10-CM

## 2024-12-31 DIAGNOSIS — Z91.81 HISTORY OF FALL: ICD-10-CM

## 2024-12-31 DIAGNOSIS — F02.B0 MODERATE LATE ONSET ALZHEIMER'S DEMENTIA WITHOUT BEHAVIORAL DISTURBANCE, PSYCHOTIC DISTURBANCE, MOOD DISTURBANCE, OR ANXIETY (HCC): Primary | ICD-10-CM

## 2024-12-31 PROCEDURE — 99211 OFF/OP EST MAY X REQ PHY/QHP: CPT | Performed by: PSYCHIATRY & NEUROLOGY

## 2024-12-31 ASSESSMENT — MONTREAL COGNITIVE ASSESSMENT (MOCA)
2. COPY DRAWING: 0/1
ORIENTATION SUBSCORE: 3/6
5. MEMORY TRIALS: SECOND TRIAL
3. DRAW A CLOCK: CONTOUR, NUMBERS, HANDS: 0/3
10. [FLUENCY] NAME WORDS STARTING WITH DESIGNATED LETTER: 0/1
8. SERIAL SUBTRACTION OF 7S: 0/5
WHAT IS THE TOTAL SCORE (OUT OF 30): 9
4. NAME EACH OF THE THREE ANIMALS SHOWN: 3/3
WHAT IS THE VERSION OF MOCA ADMINISTERED: 7.3
11. FOR EACH PAIR OF WORDS, WHAT CATEGORY DO THEY BELONG TO (OUT OF 2): 2/2
9. REPEAT EACH SENTENCE: 0/2
DELAYED RECALL SUBSCORE: 0/5
1. ALTERNATING TRAIL MAKING: 0/1
6. READ LIST OF DIGITS [FORWARD/BACKWARD]: 1/2
7. [VIGILENCE] TAP WHEN HEARING DESIGNATED LETTER: 0/1

## 2024-12-31 ASSESSMENT — PATIENT HEALTH QUESTIONNAIRE - PHQ9
SUM OF ALL RESPONSES TO PHQ QUESTIONS 1-9: 4
CLINICAL INTERPRETATION OF PHQ2 SCORE: 2
5. POOR APPETITE OR OVEREATING: 0 - NOT AT ALL

## 2024-12-31 ASSESSMENT — FIBROSIS 4 INDEX: FIB4 SCORE: 2.77

## 2025-01-01 NOTE — PROGRESS NOTES
"Reason for Neurology Consult:  Concern for Dementia    History of present illness:    Denae Rivas 89 y.o.right handed woman who is here with her daughter.  She is originally from [place] and graduated from High School there and had 2 years of college at [employer]. She got  soon after college.  She had difficulty telling me that she had a job> daughter added she worked at [employer] in [place] and became a  in her mid 50s.  She has been living in [place] for 48 years (and Denae however literally could not tell me how long she was living here).   for many years.    Problem List Reviewed.  COVID 19 positive about 1 year and 2 years ago:  mild URI symptoms (very mild temperature and dry cough).     Before December 14th 2024 she had been living in an apartment for many years alone.  Daughter was noticing atleast 3 years ago that Denae was having difficulty driving to the point of losing her car (thought she was going into a MD's office) and ended not being to locate her car> that was end the driving.  She frequently repeats herself throughout the day (within 20-30 seconds) and continuous repeats self even within \"seconds to minutes\" can repeat herself which has been an evolving and become more frequent in the last 2 weeks.    Speech-communication- there has clearly been a change in word production and especially word retrieval. She can rarely complete sentences anymore for the last to 1 to 1.5 years.    There has been \"paranoia\" for over 40 years- she may say for instance > \"she will say things that are not real\" and will talk about \"Them or They were telling me this or that\" for the last 9-12 months.    Had a single mechanical fall when she stepped up on the [place] and lost her balance and landed backwards> hit back of head without LOC  (daughter was called by Denae about this issue)    Since mid December and returning from a recent hospitalization at [facility] daughter has not " "been recognizing susannah sundowning behaviors in the last 9-10 days but she can get \"moser and get mad\" some evenings.    Walking seems well preserved per daughter without clear decline in gait-balance in the last 6-12 months or so.    No dysarthria,dysphagia,diplopia or vertiginous features in the recent months.    She is having a hard time dressing and picking out appropriate clothes for over the last 8-12 months or so.    No ongoing or evolving headaches in the recent few weeks.    She has a long history of scoliosis dating back over 40 years ago and remotely she wore a brace.S- shaped scoliosis.    There has been no history of concussion(s),seizure(s) or stroke events.    Family Hx:    No clear history of a progressive neurodegenerative disease or any discrete dementia like issue.  Mother: Long standing Type 2 Diabetes>  around at age 70.  Father:  at age 99      None smoker.  Rare alcohol use in adult life.  No IVDA or illicit drug use.      Patient Active Problem List    Diagnosis Date Noted    Hyponatremia 2024    Moderate protein malnutrition (HCC) 2024    Acute metabolic encephalopathy 12/10/2024    Demand ischemia (HCC) 12/10/2024    Hypothermia 2024    Cerebral atrophy (HCC) 2024    History of fall 2024    Dementia ([facility]) 08/10/2023    Memory deficit 2023    Visual changes 2023    Nonspecific abnormal function study, cardiovascular 2023    Encounter for completion of form with patient 2022    BMI 20.0-20.9, adult 2022    Subclinical hypothyroidism 2022    Thrombocytopenia (HCC) 2022    HSV infection 2021    History of basal cell carcinoma (BCC) of skin 2021    Body mass index (BMI) of 19.0-19.9 in adult 2021    Osteoporosis without current pathological fracture 2021       Past medical history:   Past Medical History:   Diagnosis Date    Anesthesia     10 months of mental problems post op    CATARACT     " removed b/l    Hypertension     Shingles 01/01/2009       Past surgical history:   Past Surgical History:   Procedure Laterality Date    ABDOMINOPLASTY  1/24/2011    Performed by [facility] at SURGERY [facility] ORS    BREAST IMPLANT REVISION  1/24/2011    Performed by [facility] at SURGERY [facility] ORS    LIPOSUCTION  1/24/2011    Performed by [facility] at SURGERY [facility] ORS    CAPSULECTOMY  1/24/2011    Performed by [facility] at SURGERY [facility] ORS    BREAST IMPLANT REVISION  9/27/2010    Performed by [facility] at SURGERY [facility] ORS    MASTOPEXY  9/27/2010    Performed by [facility] at SURGERY [facility] ORS    CATARACT EXTRACTION WITH IOL  2002    OTHER  1985    rhinoplasty rhytidectomy    MAMMOPLASTY AUGMENTATION  1976/85/94    MO BREAST AUGMENTATION WITH IMPLANT  76,84,94,10         Social history:   Social History     Socioeconomic History    Marital status: Single     Spouse name: Not on file    Number of children: Not on file    Years of education: Not on file    Highest education level: Some college, no degree   Occupational History    Not on file   Tobacco Use    Smoking status: Never    Smokeless tobacco: Never   Vaping Use    Vaping status: Never Used   Substance and Sexual Activity    Alcohol use: No     Comment: quit 30 years     Drug use: No    Sexual activity: Not on file   Other Topics Concern    Not on file   Social History Narrative    Not on file     Social Drivers of Health     Financial Resource Strain: Low Risk  (9/19/2024)    Overall Financial Resource Strain (CARDIA)     Difficulty of Paying Living Expenses: Not hard at all   Food Insecurity: No Food Insecurity (12/10/2024)    Hunger Vital Sign     Worried About Running Out of Food in the Last Year: Never true     Ran Out of Food in the Last Year: Never true   Transportation Needs: No Transportation Needs (12/10/2024)    PRAPARE - Transportation     Lack of  Transportation (Medical): No     Lack of Transportation (Non-Medical): No   Physical Activity: Inactive (7/20/2024)    Exercise Vital Sign     Days of Exercise per Week: 0 days     Minutes of Exercise per Session: 0 min   Stress: No Stress Concern Present (7/20/2024)    [employer] - Occupational Stress Questionnaire     Feeling of Stress : Only a little   Social Connections: Socially Isolated (7/20/2024)    Social Connection and Isolation Panel [NHANES]     Frequency of Communication with Friends and Family: More than three times a week     Frequency of Social Gatherings with Friends and Family: More than three times a week     Attends Orthodoxy Services: Never     Active Member of Clubs or Organizations: No     Attends Club or Organization Meetings: Never     Marital Status:    Intimate Partner Violence: Not At Risk (12/10/2024)    Humiliation, Afraid, Rape, and Kick questionnaire     Fear of Current or Ex-Partner: No     Emotionally Abused: No     Physically Abused: No     Sexually Abused: No   Housing Stability: Low Risk  (12/10/2024)    Housing Stability Vital Sign     Unable to Pay for Housing in the Last Year: No     Number of Times Moved in the Last Year: 0     Homeless in the Last Year: No       Family history:   Family History   Problem Relation Age of Onset    Diabetes Other     Heart Disease Other     Hypertension Other     Heart Disease Father          Current medications:   Current Outpatient Medications   Medication    polyethylene glycol/lytes (MIRALAX) Pack    acyclovir (ZOVIRAX) 400 MG tablet    Nutritional Supplements (NUTRITIONAL SUPPLEMENT PO)    Cholecalciferol (VITAMIN D3) 125 MCG (5000 UT) Cap    Nutritional Supplements (NUTRITIONAL SUPPLEMENT PO)    Iodine, Kelp, (KELP PO)    Nutritional Supplements (NUTRITIONAL SUPPLEMENT PO)     No current facility-administered medications for this visit.       Medication Allergy:  Allergies   Allergen Reactions     "Bloodless      Pt's preference    Tetracycline      \"felt intoxicated\"           Physical examination:   Vitals:    12/31/24 1555   BP: (!) 156/74   BP Location: Left arm   Patient Position: Sitting   BP Cuff Size: Small adult   Pulse: (!) 57   Resp: 12   Temp: 36.2 °C (97.2 °F)   TempSrc: Temporal   SpO2: 96%   Weight: 44 kg (97 lb)   Height: 1.524 m (5')       Repeat blood pressure:  142/82 and pulse of 62-regular.  Normal cephalic atraumatic.  There is full range of movement around the neck in all directions without restrictions or discrete pain evoked triggers.  No lower extremity edema.    Neurological  Exam:      Phelps Cognitive Assessment (MOCA) Version 7.1    Years of Education: 2 years of college    TOTAL SCORE: 09/30  (to be scanned into the MEDIA section in the E.M.R.)    She thinks she is \"70 something\">   she is 89 years old.    She could NOT figure out what \"2 dimes, 1 nickel and 1 marlon\" would be in cents even after thinking about this for nearly 30 seconds.      Mental status: Awake, alert and oriented to person,place and generation situation. She had no idea the date,day of the week, or year (she had no idea the year).Normal attention and concentration.  Did not appear/act combative,irritable,anxious,paranoid/delusional or aggressive to or with me.    Speech and language: Speech is with reduced fluency.    Follows 2 step motor commands in sequence without significant delay and correctly.    Cranial nerve exam:  II: Pupils are equally round and reactive to light. Visual fields are intact by confrontation.  III, IV, VI: EOMI, no diplopia, no ptosis.  V: Sensation to light touch is normal over V1-3 distributions bilaterally.  .  VII: Facial movements are symmetrical. There is no facial droop. .  VIII: Hearing intact to soft speech and finger rub bilaterally  IX: Palate elevates symmetrically, uvula is midline. Dysarthria is not present.  XI: Shoulder shrug are symmetrical and strong.   XII: Tongue " protrudes midline.      Motor exam:  Muscle tone is normal in all 4 limbs. and No abnormal movements appreciated.    Muscle strength:    Neck Flexors/Extensors: 5/5       Right  Left  Deltoid   5/5  5/5      Biceps   5/5  5/5  Triceps              5/5  5/5   Wrist extensors 5/5  5/5  Wrist flexors  5/5  5/5     5/5  5/5  Interossei  5/5  5/5  Thenar (APB)  5/5  5/5   Hip flexors  5/5  5/5  Quadriceps  5/5  5/5    Hamstrings  5/5  5/5  Dorsiflexors  5/5  5/5  Plantarflexors  5/5  5/5  Toe extension  5/5  5/5      Sensory exam:    Vibratory: 4 seconds at the great toes, 6-8 seconds at the great toes and symmetrical; 10 seconds at the knees and symmetrical.    Proprioception: normal at the great toes    Reflexes:       Right  Left  Biceps   2/4  2/4  Triceps              2/4  2/4  Brachioradialis 2/4  2/4  Knee jerk  2/4  2/4  Ankle jerk  1/4  1/4     Frontal release signs are absent    bilaterally toes are downgoing to plantar stimulation..    Coordination (finger-to-nose, heel/knee/shin, rapid alternating movements) was normal.     There was no ataxia, no tremors, and no dysmetria.     Station and gait > easily stands up from exam chair without retropulsion,veering,leaning,swaying (to either side).     No Rombergism.    Negative Pull Sign.    Labs and Tests and NEUROIMAGING:     Reading Physician Reading Date Result Priority   Beverly Cortez M.D.  [phone] 12/10/2024      Narrative & Impression     12/10/2024 12:31 AM     HISTORY/REASON FOR EXAM: Altered Level Of Consciousness     TECHNIQUE/EXAM DESCRIPTION:  CT of the head without contrast.     Sequential axial images were obtained from the vertex to the skull base without contrast.     Up to date radiation dose reduction adjustments have been utilized to meet ALARA standards for radiation dose reduction.     COMPARISON: August 29, 2024     FINDINGS:     There is mild diffuse parenchymal volume loss observed. Periventricular and subcortical white matter  low-attenuation changes are seen, most commonly associated with small vessel ischemic disease. There is mild bilateral symmetric ventricular dilatation   seen, with appearance likely representing ex vacuo dilatation. No space occupying lesions or areas of acute vascular territory infarctions are identified. There are no abnormal extra axial fluid collections or extra axial hemorrhage identified.     The visualized paranasal sinuses and mastoid air cells are well aerated bilaterally. No depressed calvarial fractures are identified. The visualized globes and retrobulbar soft tissues appear within normal limits.  Atherosclerotic intracranial   calcifications are seen.     IMPRESSION:        1.  No acute intracranial abnormality is identified, there are nonspecific white matter changes, commonly associated with small vessel ischemic disease.  Associated mild cerebral atrophy is noted.  2.  Atherosclerosis.                 Exam Ended: 12/10/24 12:41 AM Last Resulted: 12/10/24 12:48 AM     7/28/2023 3:35 PM     HISTORY/REASON FOR EXAM: Memory issues, confusion        TECHNIQUE/EXAM DESCRIPTION:     T1 sagittal, T2 axial, flair coronal, T1 coronal, and diffusion-weighted axial images were obtained of the brain.     COMPARISON: CT brain 1/25/2011     FINDINGS:     Nonspecific T2 hyperintensities are noted in the periventricular and deep white matter, most likely related to chronic microvascular ischemia.  Age-related volume loss noted with prominent sulci, cisterns and ventricles. Ventricular dilatation is proportionate to the degree of cerebral volume loss. Diffusion-weighted images are normal. No acute infarction is identified. Brainstem, cerebellum are   within normal limits. Intracranial flow voids are normal.  The gradient-echo images do not demonstrate any evidence of intracranial hemorrhage.  Bone marrow signal in the calvarium is within normal limits.  Included portions of the paranasal sinuses are within normal  limits.  Included portions of the mastoid air cells are within normal limits.  Included portions of the orbits are within normal limits     IMPRESSION:        No acute intracranial process.     Age-related volume loss and chronic microvascular ischemic changes.        Exam Ended: 07/28/23  4:27 PM Last Resulted: 07/29/23  8:51 AM       Order Details        View Encounter        Lab and Collection Details        Routing        Result History     View All Conversations on this Encounter       ntains abnormal data VITAMIN B12  Order: [id number]   Status: Final result       Visible to patient: Yes (not seen)       Next appt: 01/02/2025 at 04:40 PM in [facility] (Herman Neal D.O.)    0 Result Notes      Component  Ref Range & Units 3 wk ago   Vitamin B12 -True Cobalamin  211 - 911 pg/mL 1434 High    Resulting Agency M             Specimen Collected: 12/10/24 12:00 AM       Ref Range & Units 2 wk ago  (12/12/24) 2 wk ago  (12/11/24) 3 wk ago  (12/10/24) 4 mo ago  (8/29/24) 11 mo ago  (1/30/24) 2 yr ago  (2/25/22) 3 yr ago  (2/11/21)   Sodium  135 - 145 mmol/L 138 133 Low  137 136 138 135 138   Potassium  3.6 - 5.5 mmol/L 4.1 4.0 3.8 4.5 4.0 4.2 4.6   Chloride  96 - 112 mmol/L 100 96 98 97 95 Low  98 99   Co2  20 - 33 mmol/L 29 28 28 24 29 26 29   Glucose  65 - 99 mg/dL 91 86 111 High  83 104 High  91 90   Bun  8 - 22 mg/dL 27 High  22 27 High  24 High  18 10 15   Creatinine  0.50 - 1.40 mg/dL 0.84 0.72 0.83 0.72 0.96 0.79 0.83   Calcium  8.4 - 10.2 mg/dL 9.3 9.3 9.7 9.6 9.9 9.6 R 9.8 R   Anion Gap  7.0 - 16.0 9.0 9.0 11.0 15.0 14.0 11.0 10.0   Resulting Agency V V V V V M M               Component  Ref Range & Units 3 yr ago 5 yr ago 6 yr ago 7 yr ago 8 yr ago 9 yr ago 10 yr ago   Cholesterol,Tot  100 - 199 mg/dL 190 162 176 167 163 170 158   Triglycerides  0 - 149 mg/dL 74 64 74 64 97 84 73   HDL  >=40 mg/dL 93 81 91 77 70 64 72   LDL  <100 mg/dL 82 68 70 77 74 89 71   Resulting Agency M M M M M M M        TSH WITH  "REFLEX TO FT4  Order: [id number] - Reflex for Order [id number]   Status: Final result       Visible to patient: Yes (not seen)       Next appt: 01/02/2025 at 04:40 PM in [facility] (Herman Neal D.O.)    0 Result Notes            Component  Ref Range & Units 11 mo ago 2 yr ago 3 yr ago 5 yr ago 6 yr ago 7 yr ago 8 yr ago   TSH  0.380 - 5.330 uIU/mL 2.600 5.670 High  CM 5.340 High  CM 4.840 CM 3.870 CM 4.600 High  R 7.530 High  R   Comment: The 2011 American Thyroid Association (PATRICK) guidelines  recommended that the interpretation of thyroid function in  pregnancy be based on trimester specific reference ranges.    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    There are no clinically features of parkinsonism or Lewy Body Dementia type symptoms such as REM SLEEP Behavioral symptoms.    Word fluency has gradually declined over the last 2 years and for nearly 1 year Denae is not able to speak with clear long sentences.    Intellectual ability has changed as has relative poor insight.    She has stopped driving after a significant issue with getting lost and going to wrong location over 1.5 years.    She has been not been able to dress " herself and pick out her clothes in the recent months.    Her grandmother likely had Alzheimer's Disease (late onset).    There are no features of parkinsonism or an evolving gait disorder in the recent 6-12 months despite a single and what appears to be an accidental fall backwards in August 2024 (her daughter does not endorse any clear gait-balance decline in the last 2 years or so).    MOCA score of 9 /30- see media section for specifics.  Memory portion done poorly.    Alzheimer's Questionnaire score of 27 per daughter today> see media section for specifics.    Functional  Activities Questionnaire score today per daughter of 30- see media section for specifics.    Plans:    A. Discussed Lifestyle factors reviewed with Denae and daughter.     B. Aricept vs Memantine discussed at length today and the side effects risks of such medications. Daughter will read about these medications.    C. I do not feel another Brain Imaging test is necessary at this time (either Brain MRI or Head CT).    D. Will recheck checking a blood folate and Vitamin B1 level.    E. Caregiver issues reviewed today with daughter and she has been getting PT periodically.    F.  Denae  and daughter both agreed and decided of NOT pursuing the IV antiamyloid medication(s)- Leqembi and Kinsunla given the risks of ARIA H and/or E and potential to accelerate brain atrophy vs small relative clinical benefit that at this point is estimated to be lasting around 5-6 months.    G. I do not feel an EEG is needed at this time.    H. I recommended her daughter check Denae's blood pressure periodically in the coming weeks and report to her primary care provider if BP over 140/90.      I have performed  a history and physical exam and a directed /focused  ROS today.    Total time spent today or this patient's care was 85 minutes  and included reviewing  the diagnostic workup to date (such as labs and imaging as well as interpreting such tests relevant to  this patient's neurological condition),  reviewing/obtaining separately obtained history (from patient and/or accompanying ffamily member)  for today's neurological problem(s) ,counseling and educating the patient and family member on issues related to cognition/memory and cognitive health factors and documenting  the clinical information in the EMR.    Follow up in about 6-9 months or so        Nickolas Bradshaw MD  [employer] [facility].   [facility]

## 2025-01-02 ENCOUNTER — APPOINTMENT (OUTPATIENT)
Dept: MEDICAL GROUP | Facility: LAB | Age: 89
End: 2025-01-02
Payer: MEDICARE

## 2025-01-02 VITALS
OXYGEN SATURATION: 96 % | WEIGHT: 97.44 LBS | DIASTOLIC BLOOD PRESSURE: 74 MMHG | TEMPERATURE: 97.1 F | SYSTOLIC BLOOD PRESSURE: 128 MMHG | HEIGHT: 60 IN | RESPIRATION RATE: 12 BRPM | HEART RATE: 60 BPM | BODY MASS INDEX: 19.13 KG/M2

## 2025-01-02 DIAGNOSIS — F03.90 DEMENTIA, UNSPECIFIED DEMENTIA SEVERITY, UNSPECIFIED DEMENTIA TYPE, UNSPECIFIED WHETHER BEHAVIORAL, PSYCHOTIC, OR MOOD DISTURBANCE OR ANXIETY (HCC): ICD-10-CM

## 2025-01-02 PROCEDURE — 3078F DIAST BP <80 MM HG: CPT | Performed by: STUDENT IN AN ORGANIZED HEALTH CARE EDUCATION/TRAINING PROGRAM

## 2025-01-02 PROCEDURE — 99214 OFFICE O/P EST MOD 30 MIN: CPT | Performed by: STUDENT IN AN ORGANIZED HEALTH CARE EDUCATION/TRAINING PROGRAM

## 2025-01-02 PROCEDURE — 3074F SYST BP LT 130 MM HG: CPT | Performed by: STUDENT IN AN ORGANIZED HEALTH CARE EDUCATION/TRAINING PROGRAM

## 2025-01-02 ASSESSMENT — FIBROSIS 4 INDEX: FIB4 SCORE: 2.77

## 2025-01-02 ASSESSMENT — ENCOUNTER SYMPTOMS
SHORTNESS OF BREATH: 0
FEVER: 0
CHILLS: 0

## 2025-01-03 NOTE — PROGRESS NOTES
Subjective:     CC:   Chief Complaint   Patient presents with    [facility] Follow-up        HPI:   Patient was admitted on 12/9 for 5 days for presumed acute toxic metabolic encephalopathy with unclear etiology.  CT head did not report any acute abnormalities that would explain her sudden change in mentation.  Patient was discharged home at her baseline mentation.  Patient has been evaluated by neurology 3 days ago and currently contemplating Aricept versus memantine. Patient is currently being evaluated by speech therapy and a nurse     Current Outpatient Medications Ordered in Epic   Medication Sig Dispense Refill    polyethylene glycol/lytes (MIRALAX) Pack Take 1 Packet by mouth every day. 30 Each 0    acyclovir (ZOVIRAX) 400 MG tablet Take 400 mg by mouth 3 times a day as needed.      Nutritional Supplements (NUTRITIONAL SUPPLEMENT PO) Take  by mouth. Shark Liver Oil - 2 capsules two times daily      Iodine, Kelp, (KELP PO) Take 1 Capsule by mouth 1 time a day as needed. (Patient not taking: Reported on 12/31/2024)      Cholecalciferol (VITAMIN D3) 125 MCG (5000 UT) Cap Take 1 Capsule by mouth every day.      Nutritional Supplements (NUTRITIONAL SUPPLEMENT PO) Take 1 Capsule by mouth every day. [place] [employer]       No current Epic-ordered facility-administered medications on file.           ROS:  Review of Systems   Constitutional:  Negative for chills and fever.   Respiratory:  Negative for shortness of breath.    Cardiovascular:  Negative for chest pain.       Objective:     Exam:  /74 (BP Location: Left arm, Patient Position: Sitting, BP Cuff Size: Adult)   Pulse 60   Temp 36.2 °C (97.1 °F) (Temporal)   Resp 12   Ht 1.524 m (5')   Wt 44.2 kg (97 lb 7.1 oz)   SpO2 96%   BMI 19.03 kg/m²  Body mass index is 19.03 kg/m².    Physical Exam  Constitutional:       General: She is not in acute distress.     Appearance: She is not ill-appearing.   Pulmonary:      Effort: Pulmonary  effort is normal. No respiratory distress.   Neurological:      Mental Status: She is alert.   Psychiatric:         Mood and Affect: Mood normal.         Behavior: Behavior normal.         Thought Content: Thought content normal.         Judgment: Judgment normal.                   Assessment & Plan:     Problem List Items Addressed This Visit       Dementia (HCC)     3+ labs reviewed   I spent a total of 31 minutes with record review, exam, communication with the patient, placing orders, and documentation of this encounter.            Please note that this dictation was created using voice recognition software. I have made every reasonable attempt to correct obvious errors, but I expect that there are errors of grammar and possibly content that I did not discover before finalizing the note.

## 2025-01-16 ENCOUNTER — HOSPITAL ENCOUNTER (OUTPATIENT)
Dept: LAB | Facility: MEDICAL CENTER | Age: 89
End: 2025-01-16
Attending: PSYCHIATRY & NEUROLOGY
Payer: MEDICARE

## 2025-01-16 DIAGNOSIS — G30.1 MODERATE LATE ONSET ALZHEIMER'S DEMENTIA WITHOUT BEHAVIORAL DISTURBANCE, PSYCHOTIC DISTURBANCE, MOOD DISTURBANCE, OR ANXIETY (HCC): ICD-10-CM

## 2025-01-16 DIAGNOSIS — F02.B0 MODERATE LATE ONSET ALZHEIMER'S DEMENTIA WITHOUT BEHAVIORAL DISTURBANCE, PSYCHOTIC DISTURBANCE, MOOD DISTURBANCE, OR ANXIETY (HCC): ICD-10-CM

## 2025-01-16 PROCEDURE — 84425 ASSAY OF VITAMIN B-1: CPT

## 2025-01-16 PROCEDURE — 82746 ASSAY OF FOLIC ACID SERUM: CPT

## 2025-01-16 PROCEDURE — 36415 COLL VENOUS BLD VENIPUNCTURE: CPT

## 2025-01-17 LAB — FOLATE SERPL-MCNC: 18 NG/ML

## 2025-01-20 LAB — VIT B1 BLD-MCNC: 194 NMOL/L (ref 70–180)

## 2025-02-07 ENCOUNTER — TELEPHONE (OUTPATIENT)
Dept: MEDICAL GROUP | Facility: LAB | Age: 89
End: 2025-02-07
Payer: MEDICARE

## 2025-02-07 DIAGNOSIS — Z11.1 SCREENING-PULMONARY TB: ICD-10-CM

## 2025-02-07 NOTE — TELEPHONE ENCOUNTER
Paperwork dropped off by daughter. They are hoping to get patient into [facility] by Tuesday.

## 2025-02-08 ENCOUNTER — HOSPITAL ENCOUNTER (OUTPATIENT)
Dept: LAB | Facility: MEDICAL CENTER | Age: 89
End: 2025-02-08
Attending: STUDENT IN AN ORGANIZED HEALTH CARE EDUCATION/TRAINING PROGRAM
Payer: MEDICARE

## 2025-02-08 DIAGNOSIS — Z11.1 SCREENING-PULMONARY TB: ICD-10-CM

## 2025-02-08 PROCEDURE — 36415 COLL VENOUS BLD VENIPUNCTURE: CPT

## 2025-02-08 PROCEDURE — 86480 TB TEST CELL IMMUN MEASURE: CPT

## 2025-02-10 LAB
GAMMA INTERFERON BACKGROUND BLD IA-ACNC: 0.03 IU/ML
M TB IFN-G BLD-IMP: NEGATIVE
M TB IFN-G CD4+ BCKGRND COR BLD-ACNC: 0.01 IU/ML
MITOGEN IGNF BCKGRD COR BLD-ACNC: 8.33 IU/ML
QFT TB2 - NIL TBQ2: 0.02 IU/ML

## 2025-03-18 ENCOUNTER — TELEPHONE (OUTPATIENT)
Dept: MEDICAL GROUP | Facility: LAB | Age: 89
End: 2025-03-18
Payer: MEDICARE

## 2025-03-18 DIAGNOSIS — F03.90 DEMENTIA, UNSPECIFIED DEMENTIA SEVERITY, UNSPECIFIED DEMENTIA TYPE, UNSPECIFIED WHETHER BEHAVIORAL, PSYCHOTIC, OR MOOD DISTURBANCE OR ANXIETY (HCC): ICD-10-CM

## 2025-03-18 NOTE — TELEPHONE ENCOUNTER
Fartun  [phone]   Requesting a hospice order ASAP to    [phone]  [place] [address] [place] [address]

## 2025-03-20 ENCOUNTER — PATIENT MESSAGE (OUTPATIENT)
Dept: HEALTH INFORMATION MANAGEMENT | Facility: OTHER | Age: 89
End: 2025-03-20